# Patient Record
Sex: FEMALE | Race: WHITE | NOT HISPANIC OR LATINO | Employment: OTHER | URBAN - METROPOLITAN AREA
[De-identification: names, ages, dates, MRNs, and addresses within clinical notes are randomized per-mention and may not be internally consistent; named-entity substitution may affect disease eponyms.]

---

## 2018-12-17 ENCOUNTER — APPOINTMENT (OUTPATIENT)
Dept: RADIOLOGY | Facility: MEDICAL CENTER | Age: 33
End: 2018-12-17
Attending: EMERGENCY MEDICINE
Payer: MEDICAID

## 2018-12-17 ENCOUNTER — HOSPITAL ENCOUNTER (EMERGENCY)
Facility: MEDICAL CENTER | Age: 33
End: 2018-12-17
Attending: EMERGENCY MEDICINE
Payer: MEDICAID

## 2018-12-17 VITALS
HEIGHT: 63 IN | OXYGEN SATURATION: 95 % | TEMPERATURE: 99 F | RESPIRATION RATE: 16 BRPM | BODY MASS INDEX: 19.61 KG/M2 | HEART RATE: 79 BPM | SYSTOLIC BLOOD PRESSURE: 133 MMHG | DIASTOLIC BLOOD PRESSURE: 82 MMHG | WEIGHT: 110.67 LBS

## 2018-12-17 DIAGNOSIS — S93.401A SPRAIN OF RIGHT ANKLE, UNSPECIFIED LIGAMENT, INITIAL ENCOUNTER: ICD-10-CM

## 2018-12-17 DIAGNOSIS — S93.499S SPRAIN OF OTHER LIGAMENT OF ANKLE, UNSPECIFIED LATERALITY, SEQUELA: ICD-10-CM

## 2018-12-17 PROCEDURE — 700111 HCHG RX REV CODE 636 W/ 250 OVERRIDE (IP): Performed by: EMERGENCY MEDICINE

## 2018-12-17 PROCEDURE — 73610 X-RAY EXAM OF ANKLE: CPT | Mod: RT

## 2018-12-17 PROCEDURE — 96372 THER/PROPH/DIAG INJ SC/IM: CPT

## 2018-12-17 PROCEDURE — 99284 EMERGENCY DEPT VISIT MOD MDM: CPT

## 2018-12-17 RX ORDER — ONDANSETRON 4 MG/1
4 TABLET, ORALLY DISINTEGRATING ORAL EVERY 6 HOURS PRN
COMMUNITY
End: 2019-09-14

## 2018-12-17 RX ORDER — LEVOTHYROXINE SODIUM 0.07 MG/1
100 TABLET ORAL
Status: SHIPPED | COMMUNITY
End: 2022-06-01

## 2018-12-17 RX ORDER — LORATADINE 10 MG/1
10 TABLET ORAL DAILY
COMMUNITY
End: 2019-09-14

## 2018-12-17 RX ORDER — PRAZOSIN HYDROCHLORIDE 1 MG/1
1 CAPSULE ORAL NIGHTLY
Status: ON HOLD | COMMUNITY
End: 2020-10-02

## 2018-12-17 RX ORDER — HYDROCODONE BITARTRATE AND ACETAMINOPHEN 5; 325 MG/1; MG/1
1-2 TABLET ORAL EVERY 6 HOURS PRN
Qty: 8 TAB | Refills: 0 | Status: SHIPPED | OUTPATIENT
Start: 2018-12-17 | End: 2018-12-20

## 2018-12-17 RX ORDER — ONDANSETRON 4 MG/1
4 TABLET, ORALLY DISINTEGRATING ORAL ONCE
Status: COMPLETED | OUTPATIENT
Start: 2018-12-17 | End: 2018-12-17

## 2018-12-17 RX ADMIN — FENTANYL CITRATE 50 MCG: 50 INJECTION, SOLUTION INTRAMUSCULAR; INTRAVENOUS at 15:53

## 2018-12-17 RX ADMIN — ONDANSETRON 4 MG: 4 TABLET, ORALLY DISINTEGRATING ORAL at 15:52

## 2018-12-17 ASSESSMENT — LIFESTYLE VARIABLES: DO YOU DRINK ALCOHOL: NO

## 2018-12-17 NOTE — ED TRIAGE NOTES
Chief Complaint   Patient presents with   • Ankle Pain     left ankle     Pt wheeled to triage, c/o left ankle pain after one of her friend fell on her left foot.

## 2018-12-17 NOTE — ED NOTES
To room by wheelchair. Agree with triage note. She states she was sitting with ankles crossed when a friend fell and landed across her feet. CMS intact. Chart up for ERP.

## 2018-12-18 NOTE — ED NOTES
Right ankle air splint applied. Given crutches and demonstrates safe use. Verbalizes understanding of discharge and followup instructions.  VSS. Given Rx and signed narcotic consent. Ambulates with crutches to discharge.

## 2018-12-18 NOTE — ED PROVIDER NOTES
ED Provider Note    CHIEF COMPLAINT  Chief Complaint   Patient presents with   • Ankle Pain     left ankle       HPI  Kristen Luna is a 33 y.o. female here for evaluation of right ankle pain.  The patient states that she was at her family's house, when somebody jumped off of a bed, and landed on her right ankle.  She states that she was unable to walk secondary to pain, but states that the pain is only confined to the right ankle.  She has no knee pain or foot pain.  She has no fever, no vomiting, and no other medical complaints.  She has no chest pain or shortness of breath.  She has no fever or chills.  Patient not taking any prior to coming in.  Moving exacerbates the symptoms, and remaining still alleviates them.    PAST MEDICAL HISTORY   has a past medical history of Arrhythmia; Bipolar affective disorder (HCC); Breath shortness; Heart burn; Pain; and PTSD (post-traumatic stress disorder).    SOCIAL HISTORY  Social History     Social History Main Topics   • Smoking status: Current Some Day Smoker     Packs/day: 0.50     Years: 1.50     Last attempt to quit: 8/26/2009   • Smokeless tobacco: Never Used      Comment: one pack every 3months   • Alcohol use No   • Drug use: No   • Sexual activity: Not on file       SURGICAL HISTORY   has a past surgical history that includes knee arthroscopy (10/2009); dental extraction(s) (2007); knee arthroscopy (1/4/2010); synovectomy (1/4/2010); and medial meniscectomy (1/4/2010).    CURRENT MEDICATIONS  Home Medications     Reviewed by Dave Obrien R.N. (Registered Nurse) on 12/17/18 at 1503  Med List Status: Partial   Medication Last Dose Status   albuterol (PROVENTIL) 2.5mg/0.5ml NEBU  Active   FLUoxetine HCl (PROZAC PO) unk Active   Gabapentin (NEURONTIN PO) taking Active   HydrOXYzine Pamoate (VISTARIL PO) unk Active   levothyroxine (SYNTHROID) 75 MCG Tab taking Active   Linaclotide (LINZESS PO) taking Active   loratadine (CLARITIN) 10 MG Tab taking Active  "  Melatonin 3 MG CAPS  Active   ondansetron (ZOFRAN ODT) 4 MG TABLET DISPERSIBLE prn Active   oxycodone-acetaminophen (PERCOCET) 5-325 MG TABS  Active   prazosin (MINIPRESS) 1 MG Cap 12/16/2018 Active   SUMAtriptan Succinate (IMITREX PO)  Active   trazodone (DESYREL) 100 MG TABS unk Active   vitamin E (VITAMIN E) 1000 UNIT CAPS  Active                ALLERGIES  Allergies   Allergen Reactions   • Codeine    • Hydrocodone Itching   • Naprosyn [Naproxen] Hives   • Zoloft      Left hand swelling       REVIEW OF SYSTEMS  See HPI for further details. Review of systems as above, otherwise all other systems are negative.     PHYSICAL EXAM  VITAL SIGNS: /82   Pulse 79   Temp 37.2 °C (99 °F) (Temporal)   Resp 16   Ht 1.6 m (5' 3\")   Wt 50.2 kg (110 lb 10.7 oz)   SpO2 95%   BMI 19.60 kg/m²   *Constitutional: Well developed, well nourished. No acute distress.  HEENT: Normocephalic, atraumatic. MMM  Neck: Supple, Full range of motion   Chest/Pulmonary:  No respiratory distress.  Equal expansion   Musculoskeletal: No deformity, no edema, neurovascular intact.  Right lower extremity; tenderness to the lateral and medial malleolus.  DPP present.  Neurovascular intact distally.  Good cap refill.  Nontender toes, and nontender right knee.  Neuro: Clear speech, appropriate, cooperative, cranial nerves II-XII grossly intact.  Psych: Normal mood and affect      PROCEDURES     MEDICAL RECORD  I have reviewed patient's medical record and pertinent results are listed above.    COURSE & MEDICAL DECISION MAKING  I have reviewed any medical record information, laboratory studies and radiographic results as noted above.    DX-ANKLE 3+ VIEWS RIGHT   Final Result      Negative RIGHT ankle series.        I you have had any blood pressure issues while here in the emergency department, please see your doctor for a further evaluation or work up.    Differential diagnoses include but not limited to: Ankle strain versus fracture    5:10 " PM  The pt has been fitted for an aircast and crutches. She will be given pain medications, and will follow up.     This patient presents with ankle sprain.  At this time, I have counseled the patient/family regarding their medications, pain control, and follow up.  They will continue their medications, if any, as prescribed.  They will return immediately for any worsening symptoms and/or any other medical concerns.  They will see their doctor, or contact the doctor provided, in 1-2 days for follow up.       FINAL IMPRESSION  Ankle sprain    Electronically signed by: Hao Arzola, 12/17/2018 5:05 PM

## 2018-12-28 ENCOUNTER — APPOINTMENT (OUTPATIENT)
Dept: RADIOLOGY | Facility: MEDICAL CENTER | Age: 33
End: 2018-12-28
Attending: EMERGENCY MEDICINE
Payer: MEDICAID

## 2018-12-28 ENCOUNTER — HOSPITAL ENCOUNTER (EMERGENCY)
Facility: MEDICAL CENTER | Age: 33
End: 2018-12-28
Attending: EMERGENCY MEDICINE
Payer: MEDICAID

## 2018-12-28 VITALS
WEIGHT: 103 LBS | DIASTOLIC BLOOD PRESSURE: 88 MMHG | TEMPERATURE: 97.4 F | SYSTOLIC BLOOD PRESSURE: 124 MMHG | OXYGEN SATURATION: 99 % | HEART RATE: 88 BPM | HEIGHT: 63 IN | BODY MASS INDEX: 18.25 KG/M2 | RESPIRATION RATE: 16 BRPM

## 2018-12-28 DIAGNOSIS — M25.531 RIGHT WRIST PAIN: ICD-10-CM

## 2018-12-28 DIAGNOSIS — M25.521 RIGHT ELBOW PAIN: ICD-10-CM

## 2018-12-28 DIAGNOSIS — S63.501A SPRAIN OF RIGHT WRIST, INITIAL ENCOUNTER: ICD-10-CM

## 2018-12-28 PROCEDURE — 700102 HCHG RX REV CODE 250 W/ 637 OVERRIDE(OP): Performed by: EMERGENCY MEDICINE

## 2018-12-28 PROCEDURE — 73110 X-RAY EXAM OF WRIST: CPT | Mod: RT

## 2018-12-28 PROCEDURE — A9270 NON-COVERED ITEM OR SERVICE: HCPCS | Performed by: EMERGENCY MEDICINE

## 2018-12-28 PROCEDURE — 700111 HCHG RX REV CODE 636 W/ 250 OVERRIDE (IP): Performed by: EMERGENCY MEDICINE

## 2018-12-28 PROCEDURE — 99284 EMERGENCY DEPT VISIT MOD MDM: CPT

## 2018-12-28 PROCEDURE — 73080 X-RAY EXAM OF ELBOW: CPT | Mod: RT

## 2018-12-28 RX ORDER — IBUPROFEN 600 MG/1
600 TABLET ORAL ONCE
Status: COMPLETED | OUTPATIENT
Start: 2018-12-28 | End: 2018-12-28

## 2018-12-28 RX ORDER — ACETAMINOPHEN 325 MG/1
650 TABLET ORAL ONCE
Status: COMPLETED | OUTPATIENT
Start: 2018-12-28 | End: 2018-12-28

## 2018-12-28 RX ORDER — IBUPROFEN 400 MG/1
400 TABLET ORAL EVERY 6 HOURS PRN
Qty: 30 TAB | Refills: 0 | Status: SHIPPED | OUTPATIENT
Start: 2018-12-28 | End: 2019-01-19

## 2018-12-28 RX ORDER — ONDANSETRON 4 MG/1
4 TABLET, ORALLY DISINTEGRATING ORAL ONCE
Status: COMPLETED | OUTPATIENT
Start: 2018-12-28 | End: 2018-12-28

## 2018-12-28 RX ORDER — ACETAMINOPHEN 325 MG/1
325 TABLET ORAL EVERY 4 HOURS PRN
Qty: 30 TAB | Refills: 0 | Status: SHIPPED | OUTPATIENT
Start: 2018-12-28 | End: 2019-09-14

## 2018-12-28 RX ADMIN — ACETAMINOPHEN 650 MG: 325 TABLET, FILM COATED ORAL at 19:27

## 2018-12-28 RX ADMIN — ONDANSETRON 4 MG: 4 TABLET, ORALLY DISINTEGRATING ORAL at 19:27

## 2018-12-28 RX ADMIN — IBUPROFEN 600 MG: 600 TABLET, FILM COATED ORAL at 21:30

## 2018-12-29 NOTE — ED TRIAGE NOTES
"Chief Complaint   Patient presents with   • Wrist Pain     R     Pt was blocking a bookstand from falling, c/o pain to R wrist and back of hand. Limited ROM, pulses palpable.    /88   Pulse 92   Temp 36.3 °C (97.3 °F) (Temporal)   Resp 18   Ht 1.6 m (5' 3\")   SpO2 100%   BMI 19.60 kg/m²     Pt Informed regarding triage process and verbalized understanding to inform triage tech or RN for any changes in condition.  Placed in lobby.    "

## 2018-12-29 NOTE — ED NOTES
Radiology contacted about elbow xray results, per radiology film waiting to be read by radiologist.

## 2018-12-29 NOTE — ED PROVIDER NOTES
"                                     ED Provider Note    Scribed for Art Valente M.D. by Abel Haddad. 12/28/2018  7:35 PM    CHIEF COMPLAINT  Chief Complaint   Patient presents with   • Wrist Pain     R       ELSY Luna is a 33 y.o. female who presents to the ED for evaluation of right wrist pain onset about 2 hours ago. The patient reports that she was catching a DVD player that was falling off of a shelf when it hit her directly in her right wrist. Per patient, the pain radiates from her wrist up into her right forearm and right elbow. She endorses experiencing associated tingling to the right hand. She denies experiencing decreased range of motion to her right hand.     REVIEW OF SYSTEMS  MSK: Right wrist pain. Right arm pain. Right elbow pain. No decreased range of motion.  Neuro: Tingling in right hand.     PAST MEDICAL HISTORY   has a past medical history of Arrhythmia; Bipolar affective disorder (HCC); Breath shortness; Heart burn; Pain; and PTSD (post-traumatic stress disorder).    SOCIAL HISTORY  Social History     Social History Main Topics   • Smoking status: Current Some Day Smoker     Packs/day: 0.50     Years: 1.50     Last attempt to quit: 8/26/2009   • Smokeless tobacco: Never Used      Comment: one pack every 3months   • Alcohol use No   • Drug use: No   • Sexual activity: Not on file       SURGICAL HISTORY   has a past surgical history that includes knee arthroscopy (10/2009); dental extraction(s) (2007); knee arthroscopy (1/4/2010); synovectomy (1/4/2010); and medial meniscectomy (1/4/2010).    CURRENT MEDICATIONS  Home Medications    **Home medications have not yet been reviewed for this encounter**         ALLERGIES  Allergies   Allergen Reactions   • Codeine    • Hydrocodone Itching   • Naprosyn [Naproxen] Hives   • Zoloft      Left hand swelling       PHYSICAL EXAM  VITAL SIGNS: /88   Pulse 92   Temp 36.3 °C (97.3 °F) (Temporal)   Resp 18   Ht 1.6 m (5' 3\")  "  SpO2 100%   BMI 19.60 kg/m²  @LAISHA[[id number]::@     Genl: F sitting in chair comfortably, speaking clearly, appears in no acute distress   Head: NC/AT   ENT: Mucous membranes moist.  Chest: No TTP  Musculoskeletal: Pain free ROM of the neck. Moving upper and lower extremities and spontaneous in coordinated fashion. No tenderness to right shoulder or humerus.      Right Upper Extremity  - Skin: No abrasions, no lacerations, no ecchymosis  - Motor: Full ROM at shoulder, elbow, wrist; 5/5 wrist flexion/extension, thumb IP joint flexion/extension (AIN/PIN), abduction/adduction (ulnar)  -Pain with palpation on olecranon and interspersed on forearm with no clear deformity. Pain in anatomical snuff box and on distal ulna.   - Sensation intact to median/ulnar/radial nerves  - 2+ radial pulse, < 2 sec cap refill x 5 digits  ?  Neuro: A&Ox4 (person, place, time, situation), speech fluent, gait steady, no focal deficits appreciated, Sensation is grossly intact in the distal upper and lower extremities  Psych: Patient has an appropriate affect and behavior  Skin: No rash or lesions.  No pallor or jaundice.  No cyanosis.  Warm and dry.    DIAGNOSTIC STUDIES / PROCEDURES    RADIOLOGY  DX-WRIST-COMPLETE 3+ RIGHT   Final Result         No acute osseous abnormality.      DX-ELBOW-COMPLETE 3+ RIGHT    (Results Pending)       COURSE & MEDICAL DECISION MAKING  Pertinent Labs & Imaging studies reviewed. (See chart for details)    Differential diagnoses include but not limited to: Fracture, dislocation, ligamentus injury, and neurovascular injury.     7:35 PM - Patient seen and examined at bedside. Patient will be treated with Tylenol 650 mg and Zofran ODT 4 mg. Ordered Dx-Elbow-Right, Dx-Wrist-Right to evaluate her symptoms.     8:31 PM Patient reevaluated at bedside. I informed her of the diagnostic results and updated her on the plan of care including splint application. The patient understood and agreed to the plan of care including  discharge.     Medical Decision Making:   Patient presented to the emergency room for evaluation of wrist, distal forearm and elbow discomfort.  The patient has pain with active range of motion with the wrist and forearm but has no evidence of swelling or clear deformity.  There was testing at multiple areas around the wrist joint and over the possible radial head and olecranon.  X-rays are obtained and the patient was treated with oral Tylenol and ibuprofen with some alleviation.  X-rays are reviewed and demonstrate no signs of occasion of any of the joints and she is placed in a supportive splint with a thumb spica.  The concern for this would be that she has possible injury to the scaphoid due to her tenderness in the anatomical snuffbox.  We discussed what this could mean and the need for repeat images and 7-10 days if her pain does not subside.  We also discussed that while she does not have evidence of a joint effusion in the head.  Following the pain medications most of her pain had subsided and she continues to have no acute neurovascular injury on reexam she is otherwise well and will be discharged home in stable condition.    DISPOSITION:  Patient will be discharged home in stable condition.    FOLLOW UP:  Bessy Lopez D.O.  [address]  [address]  [facility] [address]  [phone]      establish care upon discharge    [facility], Emergency Dept  [address] [address] [phone]    If symptoms worsen    Viraj Jameson M.D.  [address] [address]  [phone]    Schedule an appointment as soon as possible for a visit in 10 days  For persistent pain      OUTPATIENT MEDICATIONS:  New Prescriptions    ACETAMINOPHEN (TYLENOL) 325 MG TAB    Take 1 Tab by mouth every four hours as needed.    IBUPROFEN (MOTRIN) 400 MG TAB    Take 1 Tab by mouth every 6 hours as needed for Moderate Pain.         FINAL IMPRESSION  Visit Diagnoses     ICD-10-CM   1. Sprain  of right wrist, initial encounter S63.501A   2. Right wrist pain M25.531   3. Right elbow pain M25.521        IAbel (Scribe), am scribing for, and in the presence of, Art Valente M.D..    Electronically signed by: Abel Haddad (Scribe), 12/28/2018    IArt M.D. personally performed the services described in this documentation, as scribed by Abel Haddad in my presence, and it is both accurate and complete. E.     The note accurately reflects work and decisions made by me.  Art Valente  12/29/2018  5:01 AM

## 2018-12-29 NOTE — ED NOTES
Patient discharged with instructions for joint sprain with prescriptions 0 signs and symptoms explained to patient for reasons to return to emergency department, Patient is understanding and has no further questions.

## 2019-01-18 ENCOUNTER — APPOINTMENT (OUTPATIENT)
Dept: RADIOLOGY | Facility: MEDICAL CENTER | Age: 34
End: 2019-01-18
Attending: EMERGENCY MEDICINE
Payer: MEDICAID

## 2019-01-18 ENCOUNTER — HOSPITAL ENCOUNTER (EMERGENCY)
Facility: MEDICAL CENTER | Age: 34
End: 2019-01-18
Attending: EMERGENCY MEDICINE
Payer: MEDICAID

## 2019-01-18 VITALS
TEMPERATURE: 97 F | WEIGHT: 112.88 LBS | OXYGEN SATURATION: 99 % | DIASTOLIC BLOOD PRESSURE: 62 MMHG | HEIGHT: 63 IN | RESPIRATION RATE: 18 BRPM | HEART RATE: 82 BPM | BODY MASS INDEX: 20 KG/M2 | SYSTOLIC BLOOD PRESSURE: 120 MMHG

## 2019-01-18 DIAGNOSIS — S93.401A SPRAIN OF RIGHT ANKLE, UNSPECIFIED LIGAMENT, INITIAL ENCOUNTER: ICD-10-CM

## 2019-01-18 PROCEDURE — 73610 X-RAY EXAM OF ANKLE: CPT | Mod: RT

## 2019-01-18 PROCEDURE — 700102 HCHG RX REV CODE 250 W/ 637 OVERRIDE(OP): Performed by: EMERGENCY MEDICINE

## 2019-01-18 PROCEDURE — 73630 X-RAY EXAM OF FOOT: CPT | Mod: RT

## 2019-01-18 PROCEDURE — 99284 EMERGENCY DEPT VISIT MOD MDM: CPT

## 2019-01-18 PROCEDURE — A9270 NON-COVERED ITEM OR SERVICE: HCPCS | Performed by: EMERGENCY MEDICINE

## 2019-01-18 RX ORDER — ACETAMINOPHEN 325 MG/1
650 TABLET ORAL ONCE
Status: COMPLETED | OUTPATIENT
Start: 2019-01-18 | End: 2019-01-18

## 2019-01-18 RX ADMIN — ACETAMINOPHEN 650 MG: 325 TABLET, FILM COATED ORAL at 22:30

## 2019-01-18 ASSESSMENT — PAIN SCALES - GENERAL: PAINLEVEL_OUTOF10: 7

## 2019-01-19 ENCOUNTER — OFFICE VISIT (OUTPATIENT)
Dept: URGENT CARE | Facility: CLINIC | Age: 34
End: 2019-01-19
Payer: MEDICAID

## 2019-01-19 VITALS
TEMPERATURE: 99.6 F | HEIGHT: 63 IN | DIASTOLIC BLOOD PRESSURE: 72 MMHG | WEIGHT: 112 LBS | RESPIRATION RATE: 16 BRPM | OXYGEN SATURATION: 96 % | BODY MASS INDEX: 19.84 KG/M2 | HEART RATE: 74 BPM | SYSTOLIC BLOOD PRESSURE: 110 MMHG

## 2019-01-19 DIAGNOSIS — S93.401D SPRAIN OF RIGHT ANKLE, UNSPECIFIED LIGAMENT, SUBSEQUENT ENCOUNTER: ICD-10-CM

## 2019-01-19 PROCEDURE — 99203 OFFICE O/P NEW LOW 30 MIN: CPT | Performed by: FAMILY MEDICINE

## 2019-01-19 ASSESSMENT — ENCOUNTER SYMPTOMS
CHILLS: 0
NUMBNESS: 0
LOSS OF SENSATION: 0
SENSORY CHANGE: 0
FEVER: 0
SHORTNESS OF BREATH: 0
INABILITY TO BEAR WEIGHT: 1
MUSCLE WEAKNESS: 0
TINGLING: 0

## 2019-01-19 NOTE — PROGRESS NOTES
Subjective:   Kristen Luna is a 33 y.o. female who presents for Ankle Pain (Lt ankle injury a month ago, getting worse)       Patient presents today after spraining ankle last night. This is a new problem  She was seen in the ED last night, x-rays were negative, and she was given a splint. She has been unable to put weight on her right ankle and has been using crutches to ambulate She states that she has sprained this ankle in the past and the splint has never worked for her. She states that she usually needs a boot to be able to help with pain and to ambulate. She states that she has been using ice and taking ibuprofen, but she has history of gastroparesis and ibuprofen suspension works better for her.       Ankle Pain    The incident occurred 12 to 24 hours ago. Incident location: walking home from work. The injury mechanism was a twisting injury and an inversion injury. The pain is present in the right ankle. The quality of the pain is described as aching and shooting. The pain is at a severity of 4/10. The pain has been constant since onset. Associated symptoms include an inability to bear weight. Pertinent negatives include no loss of sensation, muscle weakness, numbness or tingling. She reports no foreign bodies present. The symptoms are aggravated by weight bearing. She has tried ice, NSAIDs and immobilization for the symptoms. The treatment provided mild relief.     Review of Systems   Constitutional: Negative for chills and fever.   Respiratory: Negative for shortness of breath.    Cardiovascular: Negative for chest pain.   Musculoskeletal:        Positive for right ankle pain   Neurological: Negative for tingling, sensory change and numbness.       PMH:  has a past medical history of Arrhythmia; Bipolar affective disorder (HCC); Breath shortness; Heart burn; Pain; and PTSD (post-traumatic stress disorder).    MEDS:   Current Outpatient Prescriptions:   •  ibuprofen (MOTRIN) 100 MG/5ML Suspension,  Take 10 mL every 4-6 hours as needed for pain, Disp: 420 mL, Rfl: 0  •  acetaminophen (TYLENOL) 325 MG Tab, Take 1 Tab by mouth every four hours as needed., Disp: 30 Tab, Rfl: 0  •  levothyroxine (SYNTHROID) 75 MCG Tab, Take 75 mcg by mouth Every morning on an empty stomach., Disp: , Rfl:   •  prazosin (MINIPRESS) 1 MG Cap, Take 1 mg by mouth every evening., Disp: , Rfl:   •  ondansetron (ZOFRAN ODT) 4 MG TABLET DISPERSIBLE, Take 4 mg by mouth every 6 hours as needed for Nausea., Disp: , Rfl:   •  Linaclotide (LINZESS PO), Take 145 mg by mouth every 48 hours., Disp: , Rfl:   •  loratadine (CLARITIN) 10 MG Tab, Take 10 mg by mouth every day., Disp: , Rfl:   •  HydrOXYzine Pamoate (VISTARIL PO), Take  by mouth., Disp: , Rfl:   •  Melatonin 3 MG CAPS, Take  by mouth every bedtime., Disp: , Rfl:   •  oxycodone-acetaminophen (PERCOCET) 5-325 MG TABS, Take 1-2 Tabs by mouth every four hours as needed for Mild Pain (pain)., Disp: 20 Each, Rfl: 0  •  albuterol (PROVENTIL) 2.5mg/0.5ml NEBU, 2.5 mg by Nebulization route every four hours as needed., Disp: , Rfl:   •  trazodone (DESYREL) 100 MG TABS, Take 100 mg by mouth every evening., Disp: , Rfl:   •  SUMAtriptan Succinate (IMITREX PO), Take  by mouth as needed., Disp: , Rfl:   •  FLUoxetine HCl (PROZAC PO), Take 20 mg by mouth., Disp: , Rfl:   •  vitamin E (VITAMIN E) 1000 UNIT CAPS, Take 1 Cap by mouth 2 Times a Day., Disp: , Rfl:   •  Gabapentin (NEURONTIN PO), Take 300 mg by mouth 2 Times a Day., Disp: , Rfl:     ALLERGIES:   Allergies   Allergen Reactions   • Codeine    • Hydrocodone Itching   • Naprosyn [Naproxen] Hives   • Zoloft      Left hand swelling   • Fentanyl Photosensitivity     Reports blurred vision        SURGHX:   Past Surgical History:   Procedure Laterality Date   • KNEE ARTHROSCOPY  1/4/2010    Performed by KRYSTIAN COSTA at SURGERY [facility] ORS   • SYNOVECTOMY  1/4/2010    Performed by KRYSTIAN COSTA at [facility] ORS   • MEDIAL  "MENISCECTOMY  1/4/2010    Performed by KRYSTIAN COSTA at SURGERY [facility]   • KNEE ARTHROSCOPY  10/2009    left   • DENTAL EXTRACTION(S)  2007    wisdom teeth       SOCHX:  reports that she has been smoking.  She has a 0.75 pack-year smoking history. She has never used smokeless tobacco. She reports that she does not drink alcohol or use drugs.    FH: Reviewed with patient, not pertinent to this visit.     Objective:   /72   Pulse 74   Temp 37.6 °C (99.6 °F)   Resp 16   Ht 1.6 m (5' 3\")   Wt 50.8 kg (112 lb)   LMP 12/30/2018   SpO2 96%   BMI 19.84 kg/m²   Physical Exam   Constitutional: She is oriented to person, place, and time. She appears well-developed and well-nourished. No distress.   HENT:   Head: Normocephalic and atraumatic.   Eyes: Conjunctivae and EOM are normal.   Neck: Normal range of motion. No tracheal deviation present.   Cardiovascular: Normal rate and regular rhythm.    Pulses:       Dorsalis pedis pulses are 2+ on the right side.        Posterior tibial pulses are 2+ on the right side.   Pulmonary/Chest: Effort normal. No respiratory distress.   Musculoskeletal:        Right ankle: She exhibits decreased range of motion and swelling. She exhibits no deformity, no laceration and normal pulse. Tenderness. AITFL and CF ligament tenderness found. No lateral malleolus, no medial malleolus, no head of 5th metatarsal and no proximal fibula tenderness found.   Pain with ROM all directions.    Neurological: She is alert and oriented to person, place, and time. No sensory deficit.   Skin: Skin is warm and dry. Capillary refill takes less than 2 seconds.   Psychiatric: She has a normal mood and affect. Her behavior is normal. Judgment and thought content normal.       Assessment/Plan:     1. Sprain of right ankle, unspecified ligament, subsequent encounter  ibuprofen (MOTRIN) 100 MG/5ML Suspension    REFERRAL TO SPORTS MEDICINE     - Patient given short boot  - Advised to continue " ibuprofen, ice daily. Educated on RICE  - Advised to advance weight bearing as tolerated  - Advised to return if symptoms worsen or do not improve    Differential diagnosis, natural history, supportive care, and indications for immediate follow-up discussed.  Patient's case was reviewed and discussed with Dr. Alonso during Franklin GARCIA's training period.

## 2019-01-19 NOTE — ED PROVIDER NOTES
ED Provider Note    Scribed for Tyrell Steel M.D. by Jen Deleon. 1/18/2019  10:06 PM    Primary care provider: Pcp Not In Computer  Means of arrival: walk in   History obtained from: patient   History limited by: none     CHIEF COMPLAINT  Chief Complaint   Patient presents with   • Ankle Injury     twisted right ankle while walking today, difficulty bearing weight, CMS intact       HPI  Kristen Luna is a 33 y.o. female who presents to the Emergency Department with right ankle pain which began this evening. Patient reports associated right foot pain. Patient reports she accidentally twisted her right ankle while walking. She is now unable to bear weight on her right ankle as it exacerbates her pain. No right knee or right hip pain. No other acute medical complaints or concerns.     REVIEW OF SYSTEMS  Pertinent positives include: right ankle pain, right foot pain. Pertinent negatives include: right knee pain, right hip pain. See history of present illness.     PAST MEDICAL HISTORY   has a past medical history of Arrhythmia; Bipolar affective disorder (HCC); Breath shortness; Heart burn; Pain; and PTSD (post-traumatic stress disorder).    SURGICAL HISTORY   has a past surgical history that includes knee arthroscopy (10/2009); dental extraction(s) (2007); knee arthroscopy (1/4/2010); synovectomy (1/4/2010); and medial meniscectomy (1/4/2010).    SOCIAL HISTORY  Social History   Substance Use Topics   • Smoking status: Current Some Day Smoker     Packs/day: 0.50     Years: 1.50     Last attempt to quit: 8/26/2009   • Smokeless tobacco: Never Used      Comment: one pack every 3months   • Alcohol use No      History   Drug Use No       FAMILY HISTORY  Family History   Problem Relation Age of Onset   • Diabetes Unknown    • Heart Disease Mother        CURRENT MEDICATIONS  Current medications can be reviewed in the nurse's note.     ALLERGIES  Allergies   Allergen Reactions   • Codeine    • Hydrocodone Itching  "  • Naprosyn [Naproxen] Hives   • Zoloft      Left hand swelling   • Fentanyl Photosensitivity     Reports blurred vision        PHYSICAL EXAM  VITAL SIGNS: /67   Pulse 87   Temp 36.1 °C (97 °F) (Temporal)   Resp 16   Ht 1.6 m (5' 3\")   Wt 51.2 kg (112 lb 14 oz)   LMP 12/30/2018   SpO2 99%   BMI 20.00 kg/m²     Constitutional: Alert in no apparent distress.  HENT: No signs of trauma, Bilateral external ears normal, Nose normal. Uvula midline.   Eyes: Pupils are equal and reactive, Conjunctiva normal, Non-icteric.   Neck: Normal range of motion, No tenderness, Supple, No stridor.   Cardiovascular: Regular rate and rhythm, no murmurs.   Thorax & Lungs: Normal breath sounds, No respiratory distress, No wheezing, No chest tenderness.   Abdomen: Bowel sounds normal, Soft, No tenderness, No peritoneal signs, No masses, No pulsatile masses.   Skin: Warm, Dry, No erythema, No rash.   Extremities: Intact distal pulses, No cyanosis. Tenderness to palpation to the right lateral posterior malleolus, tenderness to palpation of the right 5th metatarsal. Full range of motion of the right knee and right hip w/o pain and with normal (5/5 strength).  Musculoskeletal: Tenderness to palpation to the right lateral posterior malleolus, tenderness to palpation of the right 5th metatarsal. Full range of motion of the right knee and right hip.   Neurologic: Alert , Normal motor function, Normal sensory function, No focal deficits noted.   Psychiatric: Affect normal, Judgment normal, Mood normal.     DIAGNOSTIC STUDIES / PROCEDURES    RADIOLOGY  DX-ANKLE 3+ VIEWS RIGHT   Final Result      Negative right foot and right ankle series      DX-FOOT-COMPLETE 3+ RIGHT   Final Result      Negative right foot and right ankle series        The radiologist's interpretation of all radiological studies have been reviewed by me.    COURSE & MEDICAL DECISION MAKING  Nursing notes, VS, PMSFHx reviewed in chart.    33 y.o. female p/w chief " complaint of right ankle and right foot pain which began this evening after accidentally twisting it while walking.    10:06 PM Patient seen and examined at bedside.     The differential diagnoses include but are not limited to:   Ordered DX right ankle and right foot to rule out fracture vs sprain.     11:25 PM- Reviewed the patient's imaging results which were negative for fracture.     11:31 PM- Patient rechecked at bedside. She was placed in a splint and is stable for discharge. The patient was updated on diagnostic test results as seen above. The patient will be discharged, status improved, with instructions regarding supportive care and medications. Instructions were given for follow-up. Discussed indications for seeking immediate medical attention. Patient was given the opportunity for questions. The patient understands and agrees.     The patient will return for new or worsening symptoms and is stable at the time of discharge.    DISPOSITION:  Patient will be discharged home in stable condition.    FOLLOW UP:  Jose Cruz Chang M.D.  [address]  [address]  [facility] [id number] [phone]      please call to schedule follow up appointment if pain persists    [facility], Emergency Dept  [facility] [facility] [address] [phone]    If symptoms worsen    FINAL IMPRESSION  1. Sprain of right ankle, unspecified ligament, initial encounter       Jen HERNANDEZ (Hasmukhibmarv), am scribing for, and in the presence of, Tyrell Steel M.D..    Electronically signed by: Jen Deleon (Hasmukhibmarv), 1/18/2019    Tyrell HERNANDEZ M.D. personally performed the services described in this documentation, as scribed by Jen Deleon in my presence, and it is both accurate and complete. E.     The note accurately reflects work and decisions made by me.  Tyrell Steel  1/19/2019  1:39 AM

## 2019-01-19 NOTE — ED TRIAGE NOTES
Kristen Luna  33 y.o.  Chief Complaint   Patient presents with   • Ankle Injury     twisted right ankle while walking today, difficulty bearing weight, CMS intact     Patient wheeled in wc with family to triage room with above complaint.  Patient appears in mild discomfort.  Mild swelling to right ankle, CMS intact.  Able to bear weight for stand up scale.      Patient escorted to the lobby and instructed to notify staff of any changes in condition.

## 2019-08-08 ENCOUNTER — HOSPITAL ENCOUNTER (OUTPATIENT)
Dept: LAB | Facility: MEDICAL CENTER | Age: 34
End: 2019-08-08
Attending: PSYCHIATRY & NEUROLOGY
Payer: MEDICAID

## 2019-08-08 LAB
CHOLEST SERPL-MCNC: 219 MG/DL (ref 100–199)
EST. AVERAGE GLUCOSE BLD GHB EST-MCNC: 100 MG/DL
FASTING STATUS PATIENT QL REPORTED: NORMAL
HBA1C MFR BLD: 5.1 % (ref 0–5.6)
HDLC SERPL-MCNC: 45 MG/DL
LDLC SERPL CALC-MCNC: 151 MG/DL
TRIGL SERPL-MCNC: 116 MG/DL (ref 0–149)
TSH SERPL DL<=0.005 MIU/L-ACNC: 5.24 UIU/ML (ref 0.38–5.33)

## 2019-08-08 PROCEDURE — 80061 LIPID PANEL: CPT

## 2019-08-08 PROCEDURE — 36415 COLL VENOUS BLD VENIPUNCTURE: CPT

## 2019-08-08 PROCEDURE — 84443 ASSAY THYROID STIM HORMONE: CPT

## 2019-08-08 PROCEDURE — 83036 HEMOGLOBIN GLYCOSYLATED A1C: CPT

## 2019-08-16 ENCOUNTER — OFFICE VISIT (OUTPATIENT)
Dept: URGENT CARE | Facility: CLINIC | Age: 34
End: 2019-08-16
Payer: MEDICAID

## 2019-08-16 ENCOUNTER — APPOINTMENT (OUTPATIENT)
Dept: RADIOLOGY | Facility: IMAGING CENTER | Age: 34
End: 2019-08-16
Attending: PHYSICIAN ASSISTANT
Payer: MEDICAID

## 2019-08-16 VITALS
OXYGEN SATURATION: 98 % | DIASTOLIC BLOOD PRESSURE: 76 MMHG | HEART RATE: 85 BPM | RESPIRATION RATE: 15 BRPM | BODY MASS INDEX: 19.84 KG/M2 | WEIGHT: 112 LBS | TEMPERATURE: 98 F | SYSTOLIC BLOOD PRESSURE: 118 MMHG | HEIGHT: 63 IN

## 2019-08-16 DIAGNOSIS — T18.9XXA FOREIGN BODY IN DIGESTIVE TRACT, INITIAL ENCOUNTER: ICD-10-CM

## 2019-08-16 PROCEDURE — 74018 RADEX ABDOMEN 1 VIEW: CPT | Performed by: EMERGENCY MEDICINE

## 2019-08-16 PROCEDURE — 99214 OFFICE O/P EST MOD 30 MIN: CPT | Performed by: PHYSICIAN ASSISTANT

## 2019-08-16 RX ORDER — FAMOTIDINE 20 MG/1
20 TABLET, FILM COATED ORAL
Refills: 2 | COMMUNITY
Start: 2019-08-07 | End: 2019-09-05

## 2019-08-16 RX ORDER — GABAPENTIN 300 MG/1
300 CAPSULE ORAL
Refills: 1 | COMMUNITY
Start: 2019-08-11 | End: 2019-09-05

## 2019-08-16 RX ORDER — OLANZAPINE 5 MG/1
TABLET ORAL
Refills: 3 | COMMUNITY
Start: 2019-08-07 | End: 2019-09-05

## 2019-08-16 RX ORDER — LORATADINE 10 MG/1
TABLET ORAL
COMMUNITY
Start: 2019-07-11 | End: 2019-09-05

## 2019-08-16 RX ORDER — LEVOTHYROXINE SODIUM 0.07 MG/1
TABLET ORAL
COMMUNITY
End: 2019-09-14

## 2019-08-16 RX ORDER — LORATADINE 10 MG/1
CAPSULE, LIQUID FILLED ORAL
COMMUNITY
End: 2019-09-05

## 2019-08-16 RX ORDER — CHOLESTYRAMINE 4 G/9G
POWDER, FOR SUSPENSION ORAL
COMMUNITY
Start: 2019-08-05 | End: 2019-09-05

## 2019-08-16 RX ORDER — PANTOPRAZOLE SODIUM 40 MG/1
TABLET, DELAYED RELEASE ORAL
COMMUNITY
Start: 2019-01-11 | End: 2019-09-14

## 2019-08-16 RX ORDER — OLANZAPINE 5 MG/1
TABLET ORAL
COMMUNITY
Start: 2019-04-02 | End: 2019-09-05

## 2019-08-16 RX ORDER — GABAPENTIN 300 MG/1
CAPSULE ORAL
COMMUNITY
Start: 2019-04-02 | End: 2019-09-05

## 2019-08-16 RX ORDER — MELOXICAM 15 MG/1
TABLET ORAL
COMMUNITY
Start: 2019-03-14 | End: 2019-09-05

## 2019-08-16 RX ORDER — PRAZOSIN HYDROCHLORIDE 1 MG/1
CAPSULE ORAL
COMMUNITY
Start: 2018-12-05 | End: 2019-09-05

## 2019-08-16 ASSESSMENT — ENCOUNTER SYMPTOMS
SHORTNESS OF BREATH: 0
COUGH: 0
DIARRHEA: 0
CHILLS: 0
PALPITATIONS: 0
ABDOMINAL PAIN: 0
VOMITING: 0
DIZZINESS: 0
NAUSEA: 0
WEIGHT LOSS: 0
CONSTIPATION: 0
WEAKNESS: 0
HEADACHES: 0
BLOOD IN STOOL: 0
DIAPHORESIS: 0
FEVER: 0
HEARTBURN: 0

## 2019-08-16 NOTE — PROGRESS NOTES
Subjective:      Kristen Luna is a 34 y.o. female who presents with Other (KUB)            Patient is a 34-year-old female who presents with request for KUB x-ray.  She is currently being seen by gastroenterology had a study done where she swallowed a capsule to measure gastroparesis.  She needs this exam done to make sure that it has dissolved.      Review of Systems   Constitutional: Negative for chills, diaphoresis, fever, malaise/fatigue and weight loss.   Respiratory: Negative for cough and shortness of breath.    Cardiovascular: Negative for chest pain and palpitations.   Gastrointestinal: Negative for abdominal pain, blood in stool, constipation, diarrhea, heartburn, melena, nausea and vomiting.   Skin: Negative for itching and rash.   Neurological: Negative for dizziness, weakness and headaches.   All other systems reviewed and are negative.    PMH:  has a past medical history of Arrhythmia, Bipolar affective disorder (HCC), Breath shortness, Heart burn, Pain, and PTSD (post-traumatic stress disorder).  MEDS:   Current Outpatient Medications:   •  meloxicam (MOBIC) 15 MG tablet, MELOXICAM 15 MG TABS, Disp: , Rfl:   •  OLANZapine (ZYPREXA) 5 MG Tab, OLANZAPINE 5 MG TABS, Disp: , Rfl:   •  Omeprazole Magnesium (ACID REDUCER) 20.6 (20 Base) MG CAPSULE DELAYED RELEASE, ACID REDUCER 20.6 (20 Base) MG CPDR, Disp: , Rfl:   •  pantoprazole (PROTONIX) 40 MG Tablet Delayed Response, PANTOPRAZOLE SODIUM 40 MG TBEC, Disp: , Rfl:   •  gabapentin (NEURONTIN) 300 MG Cap, GABAPENTIN 300 MG CAPS, Disp: , Rfl:   •  prazosin (MINIPRESS) 1 MG Cap, PRAZOSIN HCL 1 MG CAPS, Disp: , Rfl:   •  loratadine (CLARITIN) 10 MG Tab, , Disp: , Rfl:   •  cholestyramine (QUESTRAN) 4 g packet, , Disp: , Rfl:   •  loratadine (CLARITIN) 5 MG/5ML syrup, TAKE 1 TABLET BY MOUTH ONCE DAILY, Disp: , Rfl: 11  •  famotidine (PEPCID) 20 MG Tab, Take 20 mg by mouth., Disp: , Rfl: 2  •  OLANZapine (ZYPREXA) 5 MG Tab, TAKE 1 TABLET BY MOUTH IN THE  EVENING FOR BIPOLAR, Disp: , Rfl: 3  •  gabapentin (NEURONTIN) 300 MG Cap, Take 300 mg by mouth., Disp: , Rfl: 1  •  levothyroxine (SYNTHROID) 75 MCG Tab, LEVOTHYROXINE SODIUM 75 MCG TABS, Disp: , Rfl:   •  linaCLOtide (LINZESS) 145 MCG Cap, LINZESS 145 MCG CAPS, Disp: , Rfl:   •  Loratadine 10 MG Cap, LORATADINE 10 MG CAPS, Disp: , Rfl:   •  ibuprofen (MOTRIN) 100 MG/5ML Suspension, Take 10 mL every 4-6 hours as needed for pain, Disp: 420 mL, Rfl: 0  •  acetaminophen (TYLENOL) 325 MG Tab, Take 1 Tab by mouth every four hours as needed., Disp: 30 Tab, Rfl: 0  •  levothyroxine (SYNTHROID) 75 MCG Tab, Take 75 mcg by mouth Every morning on an empty stomach., Disp: , Rfl:   •  prazosin (MINIPRESS) 1 MG Cap, Take 1 mg by mouth every evening., Disp: , Rfl:   •  ondansetron (ZOFRAN ODT) 4 MG TABLET DISPERSIBLE, Take 4 mg by mouth every 6 hours as needed for Nausea., Disp: , Rfl:   •  Linaclotide (LINZESS PO), Take 145 mg by mouth every 48 hours., Disp: , Rfl:   •  loratadine (CLARITIN) 10 MG Tab, Take 10 mg by mouth every day., Disp: , Rfl:   •  HydrOXYzine Pamoate (VISTARIL PO), Take  by mouth., Disp: , Rfl:   •  Melatonin 3 MG CAPS, Take  by mouth every bedtime., Disp: , Rfl:   •  oxycodone-acetaminophen (PERCOCET) 5-325 MG TABS, Take 1-2 Tabs by mouth every four hours as needed for Mild Pain (pain)., Disp: 20 Each, Rfl: 0  •  albuterol (PROVENTIL) 2.5mg/0.5ml NEBU, 2.5 mg by Nebulization route every four hours as needed., Disp: , Rfl:   •  trazodone (DESYREL) 100 MG TABS, Take 100 mg by mouth every evening., Disp: , Rfl:   •  SUMAtriptan Succinate (IMITREX PO), Take  by mouth as needed., Disp: , Rfl:   •  FLUoxetine HCl (PROZAC PO), Take 20 mg by mouth., Disp: , Rfl:   •  vitamin E (VITAMIN E) 1000 UNIT CAPS, Take 1 Cap by mouth 2 Times a Day., Disp: , Rfl:   •  Gabapentin (NEURONTIN PO), Take 300 mg by mouth 2 Times a Day., Disp: , Rfl:   ALLERGIES:   Allergies   Allergen Reactions   • Codeine    • Hydrocodone Itching  "  • Naprosyn [Naproxen] Hives   • Zoloft      Left hand swelling   • Fentanyl Photosensitivity     Reports blurred vision      SURGHX:   Past Surgical History:   Procedure Laterality Date   • KNEE ARTHROSCOPY  1/4/2010    Performed by KRYSTIAN COSTA at SURGERY [facility]   • SYNOVECTOMY  1/4/2010    Performed by KRYSTIAN COSTA at SURGERY [facility]   • MEDIAL MENISCECTOMY  1/4/2010    Performed by KRYSTIAN COSTA at SURGERY [facility]   • KNEE ARTHROSCOPY  10/2009    left   • DENTAL EXTRACTION(S)  2007    wisdom teeth     SOCHX:  reports that she has been smoking. She has a 0.75 pack-year smoking history. She has never used smokeless tobacco. She reports that she does not drink alcohol or use drugs.  FH: Family history was reviewed, no pertinent findings to report  Medications, Allergies, and current problem list reviewed today in Epic       Objective:     /76   Pulse 85   Temp 36.7 °C (98 °F) (Temporal)   Resp 15   Ht 1.6 m (5' 3\")   Wt 50.8 kg (112 lb)   SpO2 98%   BMI 19.84 kg/m²      Physical Exam   Constitutional: She appears well-developed and well-nourished.   Cardiovascular: Normal rate, regular rhythm and normal heart sounds.   Pulmonary/Chest: Effort normal and breath sounds normal.   Skin: Skin is warm and dry. No rash noted. No erythema.   Psychiatric: She has a normal mood and affect. Her behavior is normal. Judgment and thought content normal.   Vitals reviewed.              8/16/2019 1:46 PM    HISTORY/REASON FOR EXAM:  possible retained GI capsule.  Possible retained GI capsule    TECHNIQUE/EXAM DESCRIPTION AND NUMBER OF VIEWS:  1 view(s) of the abdomen.    COMPARISON: None    FINDINGS:  AP view of the abdomen demonstrates a nonobstructive bowel gas pattern. There is a small amount of colonic stool. Surgical clips project over the right upper quadrant. No radiopaque foreign body is identified to suggest a retained capsule. Rounded   structure projecting " anterior to the right ischium is likely external to the patient. There is a transitional vertebral body at the lumbosacral junction.      Impression       No radiopaque foreign bodies identified to suggest a retained capsule.       Assessment/Plan:     1. Foreign body in digestive tract, initial encounter  - NEG X RAY- Follow up with GI  - [id number] VIEW; Future    Differential diagnosis, natural history, supportive care discussed. Follow-up with primary care provider within 7-10 days, emergency room precautions discussed.  Patient and/or family appears understanding of information.  Handout and review of patients diagnosis and treatment was discussed extensively.

## 2019-09-05 ENCOUNTER — HOSPITAL ENCOUNTER (EMERGENCY)
Facility: MEDICAL CENTER | Age: 34
End: 2019-09-05
Attending: EMERGENCY MEDICINE
Payer: MEDICAID

## 2019-09-05 ENCOUNTER — APPOINTMENT (OUTPATIENT)
Dept: RADIOLOGY | Facility: MEDICAL CENTER | Age: 34
End: 2019-09-05
Attending: EMERGENCY MEDICINE
Payer: MEDICAID

## 2019-09-05 VITALS
BODY MASS INDEX: 24.46 KG/M2 | HEART RATE: 68 BPM | SYSTOLIC BLOOD PRESSURE: 135 MMHG | DIASTOLIC BLOOD PRESSURE: 72 MMHG | TEMPERATURE: 98.8 F | RESPIRATION RATE: 17 BRPM | HEIGHT: 62 IN | OXYGEN SATURATION: 100 % | WEIGHT: 132.94 LBS

## 2019-09-05 DIAGNOSIS — R07.81 RIB PAIN ON RIGHT SIDE: ICD-10-CM

## 2019-09-05 LAB — EKG IMPRESSION: NORMAL

## 2019-09-05 PROCEDURE — 99284 EMERGENCY DEPT VISIT MOD MDM: CPT

## 2019-09-05 PROCEDURE — 700111 HCHG RX REV CODE 636 W/ 250 OVERRIDE (IP): Performed by: EMERGENCY MEDICINE

## 2019-09-05 PROCEDURE — 93005 ELECTROCARDIOGRAM TRACING: CPT

## 2019-09-05 PROCEDURE — 93005 ELECTROCARDIOGRAM TRACING: CPT | Performed by: EMERGENCY MEDICINE

## 2019-09-05 PROCEDURE — 71101 X-RAY EXAM UNILAT RIBS/CHEST: CPT | Mod: RT

## 2019-09-05 PROCEDURE — 96372 THER/PROPH/DIAG INJ SC/IM: CPT

## 2019-09-05 RX ORDER — OXYCODONE HYDROCHLORIDE AND ACETAMINOPHEN 5; 325 MG/1; MG/1
1 TABLET ORAL ONCE
Status: DISCONTINUED | OUTPATIENT
Start: 2019-09-05 | End: 2019-09-05 | Stop reason: HOSPADM

## 2019-09-05 RX ORDER — OXYCODONE HYDROCHLORIDE AND ACETAMINOPHEN 5; 325 MG/1; MG/1
1 TABLET ORAL EVERY 4 HOURS PRN
Qty: 10 TAB | Refills: 0 | Status: SHIPPED | OUTPATIENT
Start: 2019-09-05 | End: 2019-09-08

## 2019-09-05 RX ORDER — MORPHINE SULFATE 4 MG/ML
4 INJECTION, SOLUTION INTRAMUSCULAR; INTRAVENOUS ONCE
Status: COMPLETED | OUTPATIENT
Start: 2019-09-05 | End: 2019-09-05

## 2019-09-05 RX ORDER — ONDANSETRON 4 MG/1
4 TABLET, ORALLY DISINTEGRATING ORAL ONCE
Status: COMPLETED | OUTPATIENT
Start: 2019-09-05 | End: 2019-09-05

## 2019-09-05 RX ADMIN — MORPHINE SULFATE 4 MG: 4 INJECTION INTRAVENOUS at 17:43

## 2019-09-05 RX ADMIN — ONDANSETRON 4 MG: 4 TABLET, ORALLY DISINTEGRATING ORAL at 17:44

## 2019-09-05 ASSESSMENT — PAIN DESCRIPTION - DESCRIPTORS: DESCRIPTORS: SHARP;SHOOTING

## 2019-09-05 NOTE — ED PROVIDER NOTES
ED Provider Note    Scribed for Enid Nance M.D. by Emilia Lara. 9/5/2019, 4:53 PM.    Primary care provider: Pcp Pt States None  Means of arrival: Walk in   History obtained from: Patient  History limited by: None     CHIEF COMPLAINT  Chief Complaint   Patient presents with   • Rib Pain     right side, injured Friday, increasing in pain   • Shortness of Breath       HPI  Kristen Luna is a 34 y.o. female who presents to the Emergency Department for right sided rib pain onset 1 week ago. The patient states she was leaning over her recliner and felt she may have pulled a muscle but the pain is worsening. She states the pain is exacerbated by inspiration. She denies nausea, vomiting, fever, abdominal pain, or leg swelling. The patient has a history of Sandra Danlos syndrome.     REVIEW OF SYSTEMS  Pertinent positives include right sided rib pain . Pertinent negatives include no nausea, vomiting, fever, abdominal pain, or leg swelling, cough, blood in sputum, bleeding problems. All other systems reviewed and negative.     PAST MEDICAL HISTORY   has a past medical history of Arrhythmia, Bipolar affective disorder (HCC), Breath shortness, Heart burn, Pain, and PTSD (post-traumatic stress disorder).    SURGICAL HISTORY   has a past surgical history that includes knee arthroscopy (10/2009); dental extraction(s) (2007); knee arthroscopy (1/4/2010); synovectomy (1/4/2010); and medial meniscectomy (1/4/2010).    SOCIAL HISTORY  Social History     Tobacco Use   • Smoking status: Current Some Day Smoker     Packs/day: 0.50     Years: 1.50     Pack years: 0.75     Last attempt to quit: 8/26/2009     Years since quitting: 10.0   • Smokeless tobacco: Never Used   • Tobacco comment: one pack every 3months   Substance Use Topics   • Alcohol use: No   • Drug use: No      Social History     Substance and Sexual Activity   Drug Use No       FAMILY HISTORY  Family History   Problem Relation Age of Onset   • Diabetes Unknown   "  • Heart Disease Mother        CURRENT MEDICATIONS  Home Medications     Reviewed by Rosibel Nogueira R.N. (Registered Nurse) on 09/05/19 at 1604  Med List Status: Complete   Medication Last Dose Status   acetaminophen (TYLENOL) 325 MG Tab  Active   Gabapentin (NEURONTIN PO)  Active   HydrOXYzine Pamoate (VISTARIL PO)  Active   levothyroxine (SYNTHROID) 75 MCG Tab  Active   levothyroxine (SYNTHROID) 75 MCG Tab  Active   loratadine (CLARITIN) 10 MG Tab  Active   Lurasidone HCl (LATUDA PO)  Active   ondansetron (ZOFRAN ODT) 4 MG TABLET DISPERSIBLE  Active   pantoprazole (PROTONIX) 40 MG Tablet Delayed Response  Active   prazosin (MINIPRESS) 1 MG Cap  Active                ALLERGIES  Allergies   Allergen Reactions   • Codeine    • Hydrocodone Itching   • Naprosyn [Naproxen] Hives   • Zoloft      Left hand swelling   • Fentanyl Photosensitivity     Reports blurred vision        PHYSICAL EXAM  VITAL SIGNS: /81   Pulse 66   Temp 37.1 °C (98.8 °F) (Temporal)   Resp 16   Ht 1.575 m (5' 2\")   Wt 60.3 kg (132 lb 15 oz)   SpO2 100%   BMI 24.31 kg/m²     Constitutional:  Well developed, No acute distress, Non-toxic appearance.   HENT: Normocephalic, Atraumatic, Bilateral external ears normal, Oropharynx moist  Eyes: PERRL, EOMI, Conjunctiva normal   Neck: Normal range of motion, No tenderness  Cardiovascular: Normal heart rate, Normal rhythm  Thorax & Lungs: Tenderness to the right lateral ribs around rib nine and ten no crepitus, no palpable deformity.Normal breath sounds, No respiratory distress,  No wheezing,   Abdomen: Benign abdominal exam, no guarding no rebound, no right upper quadrant tenderness, no distention  Skin: Warm, Dry, No erythema, No bruising  Back: No tenderness, No CVA tenderness.   Extremities: Intact distal pulses, No edema, No tenderness   Neurologic: Alert & oriented x 3, Normal motor function, Normal sensory function, No focal deficits noted.   Psychiatric: Appropriate                  "                                    DIAGNOSTIC STUDIES / PROCEDURES\    LABS    Results for orders placed or performed during the hospital encounter of 19   EKG (NOW)   Result Value Ref Range    Report       [facility].    Test Date:  2019  Pt Name:    ELFI CRONIN               Department: ER  MRN:        [id number]                      Room:  Gender:     Female                       Technician: [id number]  :        1985                   Requested By:ER TRIAGE PROTOCOL  Order #:    [id number]                    Reading MD:    Measurements  Intervals                                Axis  Rate:       66                           P:          28  MD:         176                          QRS:        -37  QRSD:       90                           T:          15  QT:         408  QTc:        428    Interpretive Statements  SINUS RHYTHM  LEFT AXIS DEVIATION  LOW VOLTAGE THROUGHOUT  Compared to ECG 2011 12:49:23  No significant changes       12 lead EKG interpreted by me.   Rhythm:  Normal sinus rhythm   Rate: 66  Axis: Left   Ectopy: None  Conduction: Normal  ST Segments: No acute change  T Waves: No acute change  Q Waves: None  Clinical Impression: No ST elevation myocardial infarction.   Comparison: No changes     RADIOLOGY  [id number] (WITH 1-VIEW CXR) RIGHT   Final Result      1.  No acute cardiopulmonary disease.   2.  No acute RIGHT rib findings.        The radiologist's interpretation of all radiological studies have been reviewed by me.    COURSE & MEDICAL DECISION MAKING  Nursing notes, VS, PMSFHx reviewed in chart.     Patient presents to the emergency department complaining of rib pain.  She is concerned that she may have a fracture.  Patient is not hypoxic or any acute respiratory distress.  She is having pain.  We will proceed with chest x-ray.  She does have equal breath sounds bilaterally.  No history of fever or productive cough.  Doubt pneumonia.   No right upper quadrant tenderness doubt a intra-abdominal injury.    4:53 PM Patient seen and examined at bedside.  Ordered for Dx-Ribs Unilateral and EKG to evaluate. Patient was treated with Morphine 4 mg and Zofran ODT 4 mg for her symptoms.    5:57 PM - The patient reevaluated at bedside. She is endorsing continued pain. The imaging results showed no signs of a fracture. I informed the patient that her it may take a few weeks for her pain to fully subside. I advised her to take Ibuprofen and Tylenol for pain.  Patient is advised to continue take deep breaths.  If she develops any blood in her sputum or fever or any difficulty breathing she is to return.  Patient understands a follow-up plan.    I reviewed prescription monitoring program for patient's narcotic use before prescribing a scheduled drug.The patient will not drink alcohol nor drive with prescribed medications. The patient will return for new or worsening symptoms and is stable at the time of discharge.    The patient is referred to a primary physician for blood pressure management, diabetic screening, and for all other preventative health concerns.    In prescribing controlled substances to this patient, I certify that I have obtained and reviewed the medical history of Kristen Luna. I have also made a good ivy effort to obtain applicable records from other providers who have treated the patient and no other records are available at this time.     I have conducted a physical exam and documented it. I have reviewed Ms. Luna’s prescription history as maintained by the [place] Prescription Monitoring Program.     I have assessed the patient’s risk for abuse, dependency, and addiction using the validated Opioid Risk Tool available at https://www.mdcalc.com/snhpjl-ffsz-smjn-ort-narcotic-abuse.     Given the above, I believe the benefits of controlled substance therapy outweigh the risks. The reasons for prescribing controlled substances include  non-narcotic, oral analgesic alternatives have been inadequate for pain control. Accordingly, I have discussed the risk and benefits, treatment plan, and alternative therapies with the patient.         DISPOSITION:  Patient will be discharged home in stable condition.    FOLLOW UP:  Colette Tinsley M.D.  [address]    [place] [address]  [phone]    Schedule an appointment as soon as possible for a visit   If symptoms worsen, return to the er.      OUTPATIENT MEDICATIONS:  New Prescriptions    OXYCODONE-ACETAMINOPHEN (PERCOCET) 5-325 MG TAB    Take 1 Tab by mouth every four hours as needed for Severe Pain for up to 3 days.          FINAL IMPRESSION  1. Rib pain on right side          IEmilia (Scribe), am scribing for, and in the presence of, Enid Nance M.D..    Electronically signed by: Emilia Lara (Scribe), 9/5/2019    IEnid M.D. personally performed the services described in this documentation, as scribed by Emilia Lara in my presence, and it is both accurate and complete.  C  The note accurately reflects work and decisions made by me.  Enid Nance  9/5/2019  11:36 PM

## 2019-09-05 NOTE — ED TRIAGE NOTES
Kristen Luna  Chief Complaint   Patient presents with   • Rib Pain     right side, injured Friday, increasing in pain   • Shortness of Breath     Pt ambulatory to triage with above complaint.  VSS, no acute distress. States injured right rib last Friday leaning over recliner chair,  Today increased pain and SOB started.  Pt taken for EKG, then  Pt returned to [facility], educated on triage process, and to inform staff of any changes or concerns.

## 2019-09-14 ENCOUNTER — APPOINTMENT (OUTPATIENT)
Dept: RADIOLOGY | Facility: MEDICAL CENTER | Age: 34
End: 2019-09-14
Attending: EMERGENCY MEDICINE
Payer: MEDICAID

## 2019-09-14 ENCOUNTER — HOSPITAL ENCOUNTER (EMERGENCY)
Facility: MEDICAL CENTER | Age: 34
End: 2019-09-14
Attending: EMERGENCY MEDICINE
Payer: MEDICAID

## 2019-09-14 VITALS
WEIGHT: 130 LBS | RESPIRATION RATE: 20 BRPM | OXYGEN SATURATION: 98 % | HEIGHT: 62 IN | HEART RATE: 70 BPM | SYSTOLIC BLOOD PRESSURE: 100 MMHG | BODY MASS INDEX: 23.92 KG/M2 | DIASTOLIC BLOOD PRESSURE: 64 MMHG | TEMPERATURE: 99 F

## 2019-09-14 DIAGNOSIS — G43.001 MIGRAINE WITHOUT AURA AND WITH STATUS MIGRAINOSUS, NOT INTRACTABLE: ICD-10-CM

## 2019-09-14 DIAGNOSIS — R40.0 SOMNOLENCE: ICD-10-CM

## 2019-09-14 LAB
ALBUMIN SERPL BCP-MCNC: 3.8 G/DL (ref 3.2–4.9)
ALBUMIN/GLOB SERPL: 1.8 G/DL
ALP SERPL-CCNC: 55 U/L (ref 30–99)
ALT SERPL-CCNC: 9 U/L (ref 2–50)
AMPHET UR QL SCN: NEGATIVE
ANION GAP SERPL CALC-SCNC: 8 MMOL/L (ref 0–11.9)
AST SERPL-CCNC: 12 U/L (ref 12–45)
BARBITURATES UR QL SCN: NEGATIVE
BASOPHILS # BLD AUTO: 0.4 % (ref 0–1.8)
BASOPHILS # BLD: 0.02 K/UL (ref 0–0.12)
BENZODIAZ UR QL SCN: NEGATIVE
BILIRUB SERPL-MCNC: 0.5 MG/DL (ref 0.1–1.5)
BUN SERPL-MCNC: 11 MG/DL (ref 8–22)
BZE UR QL SCN: NEGATIVE
CALCIUM SERPL-MCNC: 8.3 MG/DL (ref 8.5–10.5)
CANNABINOIDS UR QL SCN: NEGATIVE
CHLORIDE SERPL-SCNC: 110 MMOL/L (ref 96–112)
CO2 SERPL-SCNC: 22 MMOL/L (ref 20–33)
CREAT SERPL-MCNC: 0.59 MG/DL (ref 0.5–1.4)
EOSINOPHIL # BLD AUTO: 0.04 K/UL (ref 0–0.51)
EOSINOPHIL NFR BLD: 0.8 % (ref 0–6.9)
ERYTHROCYTE [DISTWIDTH] IN BLOOD BY AUTOMATED COUNT: 43.8 FL (ref 35.9–50)
ETHANOL BLD-MCNC: 0.01 G/DL
GLOBULIN SER CALC-MCNC: 2.1 G/DL (ref 1.9–3.5)
GLUCOSE SERPL-MCNC: 89 MG/DL (ref 65–99)
HCG SERPL QL: NEGATIVE
HCT VFR BLD AUTO: 38.2 % (ref 37–47)
HGB BLD-MCNC: 12.9 G/DL (ref 12–16)
IMM GRANULOCYTES # BLD AUTO: 0.01 K/UL (ref 0–0.11)
IMM GRANULOCYTES NFR BLD AUTO: 0.2 % (ref 0–0.9)
LYMPHOCYTES # BLD AUTO: 1.62 K/UL (ref 1–4.8)
LYMPHOCYTES NFR BLD: 31.3 % (ref 22–41)
MCH RBC QN AUTO: 31.5 PG (ref 27–33)
MCHC RBC AUTO-ENTMCNC: 33.8 G/DL (ref 33.6–35)
MCV RBC AUTO: 93.4 FL (ref 81.4–97.8)
METHADONE UR QL SCN: NEGATIVE
MONOCYTES # BLD AUTO: 0.52 K/UL (ref 0–0.85)
MONOCYTES NFR BLD AUTO: 10 % (ref 0–13.4)
NEUTROPHILS # BLD AUTO: 2.97 K/UL (ref 2–7.15)
NEUTROPHILS NFR BLD: 57.3 % (ref 44–72)
NRBC # BLD AUTO: 0 K/UL
NRBC BLD-RTO: 0 /100 WBC
OPIATES UR QL SCN: NEGATIVE
OXYCODONE UR QL SCN: NEGATIVE
PCP UR QL SCN: NEGATIVE
PLATELET # BLD AUTO: 164 K/UL (ref 164–446)
PMV BLD AUTO: 10.5 FL (ref 9–12.9)
POTASSIUM SERPL-SCNC: 3.3 MMOL/L (ref 3.6–5.5)
PROPOXYPH UR QL SCN: NEGATIVE
PROT SERPL-MCNC: 5.9 G/DL (ref 6–8.2)
RBC # BLD AUTO: 4.09 M/UL (ref 4.2–5.4)
SODIUM SERPL-SCNC: 140 MMOL/L (ref 135–145)
WBC # BLD AUTO: 5.2 K/UL (ref 4.8–10.8)

## 2019-09-14 PROCEDURE — 36415 COLL VENOUS BLD VENIPUNCTURE: CPT

## 2019-09-14 PROCEDURE — 84703 CHORIONIC GONADOTROPIN ASSAY: CPT

## 2019-09-14 PROCEDURE — 700111 HCHG RX REV CODE 636 W/ 250 OVERRIDE (IP): Performed by: EMERGENCY MEDICINE

## 2019-09-14 PROCEDURE — 96375 TX/PRO/DX INJ NEW DRUG ADDON: CPT

## 2019-09-14 PROCEDURE — 700102 HCHG RX REV CODE 250 W/ 637 OVERRIDE(OP): Performed by: EMERGENCY MEDICINE

## 2019-09-14 PROCEDURE — 99284 EMERGENCY DEPT VISIT MOD MDM: CPT

## 2019-09-14 PROCEDURE — 80307 DRUG TEST PRSMV CHEM ANLYZR: CPT

## 2019-09-14 PROCEDURE — A9270 NON-COVERED ITEM OR SERVICE: HCPCS | Performed by: EMERGENCY MEDICINE

## 2019-09-14 PROCEDURE — 70450 CT HEAD/BRAIN W/O DYE: CPT

## 2019-09-14 PROCEDURE — 85025 COMPLETE CBC W/AUTO DIFF WBC: CPT

## 2019-09-14 PROCEDURE — 80053 COMPREHEN METABOLIC PANEL: CPT

## 2019-09-14 PROCEDURE — 96374 THER/PROPH/DIAG INJ IV PUSH: CPT

## 2019-09-14 RX ORDER — PROCHLORPERAZINE EDISYLATE 5 MG/ML
10 INJECTION INTRAMUSCULAR; INTRAVENOUS ONCE
Status: COMPLETED | OUTPATIENT
Start: 2019-09-14 | End: 2019-09-14

## 2019-09-14 RX ORDER — DIPHENHYDRAMINE HYDROCHLORIDE 50 MG/ML
12.5 INJECTION INTRAMUSCULAR; INTRAVENOUS ONCE
Status: COMPLETED | OUTPATIENT
Start: 2019-09-14 | End: 2019-09-14

## 2019-09-14 RX ORDER — TRAZODONE HYDROCHLORIDE 50 MG/1
50 TABLET ORAL NIGHTLY
Status: ON HOLD | COMMUNITY
End: 2020-10-02

## 2019-09-14 RX ORDER — ACETAMINOPHEN 325 MG/1
650 TABLET ORAL ONCE
Status: COMPLETED | OUTPATIENT
Start: 2019-09-14 | End: 2019-09-14

## 2019-09-14 RX ADMIN — DIPHENHYDRAMINE HYDROCHLORIDE 12.5 MG: 50 INJECTION INTRAMUSCULAR; INTRAVENOUS at 17:30

## 2019-09-14 RX ADMIN — ACETAMINOPHEN 650 MG: 325 TABLET, FILM COATED ORAL at 16:02

## 2019-09-14 RX ADMIN — PROCHLORPERAZINE EDISYLATE 10 MG: 5 INJECTION INTRAMUSCULAR; INTRAVENOUS at 17:34

## 2019-09-14 NOTE — ED NOTES
Pt presents to ED via EMS for initial reports of ALOC. Per report pt was at the air races when she fell asleep on her significant others shoulder. Significant other reported that pt was hard to arouse. Pt is mildly drowsy but alert and oriented at this time. Pt denied alcohol use today. BS 91. PIV in place. 700ml IVF given. Pt reports a HA currently and R rib pain she states is from trying to get the recliner to move the other day. Pt placed on monitor. Blood sent from IV if needed.

## 2019-09-14 NOTE — ED PROVIDER NOTES
ED Provider Note    Scribed for Miguel Ward M.D. by Asa Chawla. 9/14/2019  2:39 PM    Primary care provider: Colette Tinsley M.D.  Means of arrival: EMS  History obtained from: Patient  History limited by: None    CHIEF COMPLAINT  Chief Complaint   Patient presents with   • ALOC   • Headache   • Rib Pain     R side       HPI  Kristen Luna is a 34 y.o. Female with a history of migraines, who presents to the Emergency Department for evaluation of a headache onset today. Per , they were at the [employer] when the patient felt overheated. Her  gave her water and reports that she was coherent. The patient states that she had a sudden headache and rates it a 7/10 in severity.  Her  stated it seemed to begin with only mild pain.  She endorses associated photophobia, but denies any cough, vomiting, nausea, or swelling. She reports that she has never had a headache like this before. Additionally, she states that she has had abdominal pain onset a few days ago. 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 She reports of one episode of diarrhea yesterday. The patient denies being pregnant. She is diabetic. She has no past medical history of aneurysms. Patient denies any drug use.     REVIEW OF SYSTEMS  Pertinent positives include: photophobia, headache  Pertinent negatives include: cough, vomiting, nausea, or swelling.  10+ systems reviewed and negative.      PAST MEDICAL HISTORY  Past Medical History:   Diagnosis Date   • Arrhythmia     SVT no episodes since 1997   • Bipolar affective disorder (HCC)    • Breath shortness     at [id number]; primary MD aware   • Gastroparesis    • Heart burn    • Hypothyroid    • Pain     Left knee 5/10   • PTSD (post-traumatic stress disorder)        FAMILY HISTORY  Family History   Problem Relation Age of Onset   • Diabetes Other    • Heart Disease Mother        SOCIAL HISTORY  Social History     Tobacco Use   • Smoking  "status: Former Smoker     Packs/day: 0.50     Years: 1.50     Pack years: 0.75     Last attempt to quit: 8/26/2009     Years since quitting: 10.0   • Smokeless tobacco: Never Used   • Tobacco comment: one pack every 3months   Substance Use Topics   • Alcohol use: No   • Drug use: No     Social History     Substance and Sexual Activity   Drug Use No       SURGICAL HISTORY  Past Surgical History:   Procedure Laterality Date   • KNEE ARTHROSCOPY  1/4/2010    Performed by KRYSTIAN COSTA at [facility] ORS   • SYNOVECTOMY  1/4/2010    Performed by KRYSTIAN COSTA at [facility] ORS   • MEDIAL MENISCECTOMY  1/4/2010    Performed by KRYSTIAN COSTA at [facility] ORS   • KNEE ARTHROSCOPY  10/2009    left   • DENTAL EXTRACTION(S)  2007    wisdom teeth       CURRENT MEDICATIONS  Home Medications     Reviewed by Chanel Thomas R.N. (Registered Nurse) on 09/14/19 at 1432  Med List Status: Partial   Medication Last Dose Status   Gabapentin (NEURONTIN PO) 9/13/2019 Active   levothyroxine (SYNTHROID) 75 MCG Tab 9/14/2019 Active   Lurasidone HCl (LATUDA PO) 9/13/2019 Active   prazosin (MINIPRESS) 1 MG Cap 9/13/2019 Active   traZODone (DESYREL) 50 MG Tab 9/13/2019 Active                ALLERGIES  Allergies   Allergen Reactions   • Codeine    • Hydrocodone Itching   • Naprosyn [Naproxen] Hives   • Zoloft      Left hand swelling   • Fentanyl Photosensitivity     Reports blurred vision        PHYSICAL EXAM  VITAL SIGNS: /73   Pulse 80   Temp 37.7 °C (99.8 °F) (Temporal)   Resp 19   Ht 1.575 m (5' 2\")   Wt 59 kg (130 lb)   LMP 08/26/2019 (Approximate)   SpO2 98%   BMI 23.78 kg/m²   Reviewed and normal including afebrile  Constitutional: Well developed, Well nourished, Sleepy.  HENT: Normocephalic, atraumatic, bilateral external ears normal, oropharynx moist, No exudates or erythema.   Eyes: PERRLA, conjunctiva pink, no scleral icterus.   Cardiovascular: Regular rate and rhythm. No " murmurs, rubs or gallops.   Respiratory: Lungs clear to auscultation bilaterally. No wheezes, rales, or rhonchi.   Abdominal:  Abdomen soft, non-tender, non distended. No rebound, or guarding.    Skin: No erythema, no rash.   Genitourinary: No costovertebral angle tenderness.   Musculoskeletal:  No edema.   Neurologic: Arouses to voice, Alert & oriented x 3, cranial nerves 2-12 intact by passive exam.  No focal deficit noted.  Psychiatric: Affect normal, Judgment normal, Mood normal.     DIFFERENTIAL DIAGNOSIS:  Differential diagnoses include but not limited to: migraine vs headache vs drug or alcohol use vs common heat exhaustion vs subarachnoid hemorrhage.     RADIOLOGY/PROCEDURES  CT-HEAD W/O   Final Result      No acute intracranial abnormality.        Radiologist interpretation have been reviewed by me.     LABORATORY:  Results for orders placed or performed during the hospital encounter of 09/14/19   CBC WITH DIFFERENTIAL   Result Value Ref Range    WBC 5.2 4.8 - 10.8 K/uL    RBC 4.09 (L) 4.20 - 5.40 M/uL    Hemoglobin 12.9 12.0 - 16.0 g/dL    Hematocrit 38.2 37.0 - 47.0 %    MCV 93.4 81.4 - 97.8 fL    MCH 31.5 27.0 - 33.0 pg    MCHC 33.8 33.6 - 35.0 g/dL    RDW 43.8 35.9 - 50.0 fL    Platelet Count 164 164 - 446 K/uL    MPV 10.5 9.0 - 12.9 fL    Neutrophils-Polys 57.30 44.00 - 72.00 %    Lymphocytes 31.30 22.00 - 41.00 %    Monocytes 10.00 0.00 - 13.40 %    Eosinophils 0.80 0.00 - 6.90 %    Basophils 0.40 0.00 - 1.80 %    Immature Granulocytes 0.20 0.00 - 0.90 %    Nucleated RBC 0.00 /100 WBC    Neutrophils (Absolute) 2.97 2.00 - 7.15 K/uL    Lymphs (Absolute) 1.62 1.00 - 4.80 K/uL    Monos (Absolute) 0.52 0.00 - 0.85 K/uL    Eos (Absolute) 0.04 0.00 - 0.51 K/uL    Baso (Absolute) 0.02 0.00 - 0.12 K/uL    Immature Granulocytes (abs) 0.01 0.00 - 0.11 K/uL    NRBC (Absolute) 0.00 K/uL   Comp Metabolic Panel   Result Value Ref Range    Sodium 140 135 - 145 mmol/L    Potassium 3.3 (L) 3.6 - 5.5 mmol/L    Chloride  110 96 - 112 mmol/L    Co2 22 20 - 33 mmol/L    Anion Gap 8.0 0.0 - 11.9    Glucose 89 65 - 99 mg/dL    Bun 11 8 - 22 mg/dL    Creatinine 0.59 0.50 - 1.40 mg/dL    Calcium 8.3 (L) 8.5 - 10.5 mg/dL    AST(SGOT) 12 12 - 45 U/L    ALT(SGPT) 9 2 - 50 U/L    Alkaline Phosphatase 55 30 - 99 U/L    Total Bilirubin 0.5 0.1 - 1.5 mg/dL    Albumin 3.8 3.2 - 4.9 g/dL    Total Protein 5.9 (L) 6.0 - 8.2 g/dL    Globulin 2.1 1.9 - 3.5 g/dL    A-G Ratio 1.8 g/dL   HCG QUAL SERUM   Result Value Ref Range    Beta-Hcg Qualitative Serum Negative Negative   DIAGNOSTIC ALCOHOL   Result Value Ref Range    Diagnostic Alcohol 0.01 (H) 0.00 g/dL       Lab results reviewed by me.     INTERVENTIONS:  Medications   acetaminophen (TYLENOL) tablet 650 mg (650 mg Oral Given 9/14/19 1602)   prochlorperazine (COMPAZINE) injection 10 mg (10 mg Intravenous Given 9/14/19 1734)   diphenhydrAMINE (BENADRYL) injection 12.5 mg (12.5 mg Intravenous Given 9/14/19 1730)     Response: Improvement in resolution of headache with Compazine but not Tylenol.    ED COURSE:  Nursing notes, VS, PMSFHx reviewed in chart.     2:39 PM - Patient seen and examined at bedside.  Ordered CT-Head w/o, estimated GFR, diagnostic alcohol, urine drug screen, hCG qual serum, CMP, and CBC with differential to evaluate.     3:57 PM - Patient will be treated with Tylenol 650 mg for her symptoms.    MEDICAL DECISION MAKING:  This patient with a history of headaches presents with a moderate severity headache that is not the worst of life.  There was some discrepancy between the patient and her significant other regarding the suddenness of onset.  Patient has a history of either Danlos connective tissue disorder.  Given this I thought it was prudent to exclude subarachnoid hemorrhage which was not evident based on CT.  She likely had a migraine headache given good response to Compazine for her pain.  Somnolence is of unclear etiology.  It is possible that patient had some heat  exhaustion today.  There is no evidence of intoxication.  After treatment for her headache she was alert awake and at baseline.  I do not believe the patient needs a follow-up lumbar puncture to further exclude subarachnoid hemorrhage.  She presented within the first 6 hours of symptoms.    PLAN:  Ibuprofen and Tylenol at first sign of headache  Headache handout given  Return for thunderclap headache, headache and fever headache and neurologic deficit    Colette Tinsley M.D.  [address]    [place] [address]  [phone]    Schedule an appointment as soon as possible for a visit   As needed if not better monday      CONDITION: Stable, improved.     FINAL IMPRESSION  1. Migraine without aura and with status migrainosus, not intractable    2. Somnolence          Asa HERNANDEZ (Scribe), am scribing for, and in the presence of, Miguel Ward M.D..    Electronically signed by: Asa Chawla (Scribe), 9/14/2019    Miguel HERNANDEZ M.D. personally performed the services described in this documentation, as scribed by Asa Chawla in my presence, and it is both accurate and complete.C.    The note accurately reflects work and decisions made by me.  Miguel Ward  9/15/2019  2:38 PM

## 2019-09-15 NOTE — ED NOTES
PIV removed. Catheter intact. Dressing applied. Bleeding controlled. D/C instructions reviewed with pt. Pt states understanding and need for follow-up. Pt left ambulatory with a steady gait. Pt denies any pain at this time. Pt will call a cab for ride home. Significant other at side.

## 2019-09-15 NOTE — DISCHARGE INSTRUCTIONS
Headache, General, Without Cause    Take ibuprofen and Tylenol immediately on the onset of headache.  Followup if not better in 2 days.  Return for sudden severe headache, headache and fever or headache and weakness.    You had a borderline or high normal blood pressure reading today.  This does not necessarily mean you have hypertension.  Please followup with your/a primary physician for comprehensive blood pressure evaluation and yearly fasting cholesterol assessment.  BP Readings from Last 3 Encounters:   09/14/19 116/73   09/05/19 135/72   08/16/19 118/76         You are having a headache. No specific cause was found today for your headache. Stress and depression are common triggers for these headaches. These headaches are not life threatening. It will not lead to other types of headaches. Headaches can be diagnosed by a patient history and a physical exam. Sometimes, further lab and x-ray studies are used to rule out more serious problems. Your caregiver can help you solve personal problems causing stress. Medication may be prescribed if depression is a problem.    HOME CARE INSTRUCTIONS  If testing was done, call for your results. Remember, it is your responsibility to get the results of all testing. Do not assume everything is fine because you do not hear from your caregiver. It is also your responsibility to keep any follow-up appointments with your caregiver, or any specialist referral.  You may use acetaminophen (Tylenol®), ibuprofen (Advil® or Motrin®), or aspirin as needed for pain and inflammation. Use these if your caregiver has not given medications which would interfere with this.  Biofeedback, massage, or other relaxation techniques may be helpful.  Ice packs or heat applied to the head and neck can be used. Do this three to four times per day, or as needed.  Physical therapy may be a useful addition to treatment.  If headaches continue, even with therapy, you may need to think about lifestyle  changes.    SEEK MEDICAL ATTENTION IF:  You develop problems with medications prescribed.  You do not respond to or obtain relief from medications.  You have a change from the usual headache.  You develop nausea or vomiting.    SEEK IMMEDIATE MEDICAL ATTENTION IF:   If your headache becomes severe.  You have an unexplained oral temperature above 102° F (38.9° C), or as your caregiver suggests.  You have a stiff neck.  You have loss of vision.  You have muscular weakness.   You have loss of muscular control.  You develop severe symptoms different from your first symptoms.  You start losing your balance or have trouble walking.  You feel faint or pass out.    AGREEMENT BETWEEN PATIENT AND HEALTHCARE TEAM:  Your signature on this document represents an understanding between you and the healthcare team that took care of you today.  That means that you:  Understand these discharge instructions.   Will monitor your condition.  Will seek immediate medical attention as instructed.    Document Released: 12/18/2006  Document Re-Released: 06/30/2008  [employer]® Patient Information ©2008 [employer], [employer].

## 2019-10-03 ENCOUNTER — HOSPITAL ENCOUNTER (OUTPATIENT)
Dept: LAB | Facility: MEDICAL CENTER | Age: 34
End: 2019-10-03
Attending: STUDENT IN AN ORGANIZED HEALTH CARE EDUCATION/TRAINING PROGRAM
Payer: MEDICAID

## 2019-10-03 LAB — VALPROATE SERPL-MCNC: 87.4 UG/ML (ref 50–100)

## 2019-10-03 PROCEDURE — 36415 COLL VENOUS BLD VENIPUNCTURE: CPT

## 2019-10-03 PROCEDURE — 80164 ASSAY DIPROPYLACETIC ACD TOT: CPT

## 2020-02-02 ENCOUNTER — HOSPITAL ENCOUNTER (OUTPATIENT)
Facility: MEDICAL CENTER | Age: 35
End: 2020-02-02
Attending: PHYSICIAN ASSISTANT
Payer: MEDICAID

## 2020-02-02 ENCOUNTER — OFFICE VISIT (OUTPATIENT)
Dept: URGENT CARE | Facility: CLINIC | Age: 35
End: 2020-02-02
Payer: MEDICAID

## 2020-02-02 ENCOUNTER — TELEPHONE (OUTPATIENT)
Dept: URGENT CARE | Facility: CLINIC | Age: 35
End: 2020-02-02

## 2020-02-02 VITALS
WEIGHT: 156 LBS | OXYGEN SATURATION: 99 % | BODY MASS INDEX: 27.64 KG/M2 | TEMPERATURE: 98.4 F | HEART RATE: 68 BPM | HEIGHT: 63 IN | SYSTOLIC BLOOD PRESSURE: 110 MMHG | DIASTOLIC BLOOD PRESSURE: 78 MMHG | RESPIRATION RATE: 18 BRPM

## 2020-02-02 DIAGNOSIS — N92.1 MENORRHAGIA WITH IRREGULAR CYCLE: ICD-10-CM

## 2020-02-02 LAB
APPEARANCE UR: CLEAR
BASOPHILS # BLD AUTO: 0.4 % (ref 0–1.8)
BASOPHILS # BLD: 0.02 K/UL (ref 0–0.12)
BILIRUB UR STRIP-MCNC: NEGATIVE MG/DL
COLOR UR AUTO: NORMAL
EOSINOPHIL # BLD AUTO: 0.07 K/UL (ref 0–0.51)
EOSINOPHIL NFR BLD: 1.4 % (ref 0–6.9)
ERYTHROCYTE [DISTWIDTH] IN BLOOD BY AUTOMATED COUNT: 45.6 FL (ref 35.9–50)
GLUCOSE UR STRIP.AUTO-MCNC: NEGATIVE MG/DL
HCT VFR BLD AUTO: 42 % (ref 37–47)
HGB BLD-MCNC: 13.7 G/DL (ref 12–16)
IMM GRANULOCYTES # BLD AUTO: 0.01 K/UL (ref 0–0.11)
IMM GRANULOCYTES NFR BLD AUTO: 0.2 % (ref 0–0.9)
INT CON NEG: NEGATIVE
INT CON POS: POSITIVE
KETONES UR STRIP.AUTO-MCNC: NEGATIVE MG/DL
LEUKOCYTE ESTERASE UR QL STRIP.AUTO: NEGATIVE
LYMPHOCYTES # BLD AUTO: 1.63 K/UL (ref 1–4.8)
LYMPHOCYTES NFR BLD: 32.1 % (ref 22–41)
MCH RBC QN AUTO: 32.2 PG (ref 27–33)
MCHC RBC AUTO-ENTMCNC: 32.6 G/DL (ref 33.6–35)
MCV RBC AUTO: 98.8 FL (ref 81.4–97.8)
MONOCYTES # BLD AUTO: 0.5 K/UL (ref 0–0.85)
MONOCYTES NFR BLD AUTO: 9.8 % (ref 0–13.4)
NEUTROPHILS # BLD AUTO: 2.85 K/UL (ref 2–7.15)
NEUTROPHILS NFR BLD: 56.1 % (ref 44–72)
NITRITE UR QL STRIP.AUTO: NEGATIVE
NRBC # BLD AUTO: 0 K/UL
NRBC BLD-RTO: 0 /100 WBC
PH UR STRIP.AUTO: 6 [PH] (ref 5–8)
PLATELET # BLD AUTO: 172 K/UL (ref 164–446)
PMV BLD AUTO: 10.9 FL (ref 9–12.9)
POC URINE PREGNANCY TEST: NEGATIVE
PROT UR QL STRIP: NEGATIVE MG/DL
RBC # BLD AUTO: 4.25 M/UL (ref 4.2–5.4)
RBC UR QL AUTO: NORMAL
SP GR UR STRIP.AUTO: 1.03
UROBILINOGEN UR STRIP-MCNC: 0.2 MG/DL
WBC # BLD AUTO: 5.1 K/UL (ref 4.8–10.8)

## 2020-02-02 PROCEDURE — 99213 OFFICE O/P EST LOW 20 MIN: CPT | Mod: 25 | Performed by: PHYSICIAN ASSISTANT

## 2020-02-02 PROCEDURE — 81025 URINE PREGNANCY TEST: CPT | Performed by: PHYSICIAN ASSISTANT

## 2020-02-02 PROCEDURE — 81002 URINALYSIS NONAUTO W/O SCOPE: CPT | Performed by: PHYSICIAN ASSISTANT

## 2020-02-02 PROCEDURE — 85025 COMPLETE CBC W/AUTO DIFF WBC: CPT

## 2020-02-02 RX ORDER — OLANZAPINE 5 MG/1
TABLET ORAL
Status: ON HOLD | COMMUNITY
Start: 2019-12-15 | End: 2020-10-02

## 2020-02-02 ASSESSMENT — ENCOUNTER SYMPTOMS
CHILLS: 0
EYE PAIN: 0
DIARRHEA: 0
MYALGIAS: 0
FEVER: 0
CONSTIPATION: 0
VOMITING: 0
SHORTNESS OF BREATH: 0
SORE THROAT: 0
BLOOD IN STOOL: 0
COUGH: 0
DIZZINESS: 1
BLURRED VISION: 0
PALPITATIONS: 0
ABDOMINAL PAIN: 1
NAUSEA: 0
FLANK PAIN: 0

## 2020-02-02 NOTE — PROGRESS NOTES
Subjective:      Kristen Luna is a 34 y.o. female who presents with Menstrual Problem (x2weeks- heavy.This has never happened before,usually only 3 days. This started after starting birth control but she has now stop taking them)    HPI:  This is a new problem. Kristen Luna is a 34 y.o. female who presents today for evaluation of abnormal menstrual cycle.  Patient says that 2 weeks ago she started a new birth control pill.  She said within a day or 2 she started having her menstrual cycle.  She said that she was due for anyways, however, did not think much of it.  She says normally her cycles last for 3 days.  She said on day 3 she said when she was not stopping she started a little concerned.  She said she continue the birth control for a week and then when the cycle still continued she stopped taking the birth control.  She said she has continued to have a cycle up until now.  She said that she has only gone through 0.5 boxes of tampons within the past 1.5 weeks.  She feels a little bit more tired than normal and says occasionally she is felt lightheaded.  She does not feel lightheaded or dizzy right now.  She was prescribed the birth control by her family doctor but has not told him about her symptoms and has not tried to follow-up with them.  She has mild intermittent abdominal cramping but nothing severe.  No urinary symptoms or abnormal vaginal discharge.      Review of Systems   Constitutional: Positive for malaise/fatigue. Negative for chills and fever.   HENT: Negative for congestion and sore throat.    Eyes: Negative for blurred vision and pain.   Respiratory: Negative for cough and shortness of breath.    Cardiovascular: Negative for chest pain and palpitations.   Gastrointestinal: Positive for abdominal pain. Negative for blood in stool, constipation, diarrhea, melena, nausea and vomiting.   Genitourinary: Positive for menstrual problem. Negative for dysuria, flank pain, frequency, hematuria  and urgency.        Abnormal vaginal bleeding   Musculoskeletal: Negative for myalgias.   Skin: Negative for rash.   Neurological: Positive for dizziness (intermittently, not currently).       PMH:  has a past medical history of Arrhythmia, Bipolar affective disorder (HCC), Breath shortness, Gastroparesis, Heart burn, Hypothyroid, Pain, and PTSD (post-traumatic stress disorder).  MEDS:   Current Outpatient Medications:   •  OLANZapine (ZYPREXA) 5 MG Tab, OLANZAPINE 5 MG TABS, Disp: , Rfl:   •  levothyroxine (SYNTHROID) 75 MCG Tab, Take 75 mcg by mouth Every morning on an empty stomach., Disp: , Rfl:   •  prazosin (MINIPRESS) 1 MG Cap, Take 1 mg by mouth every evening., Disp: , Rfl:   •  Gabapentin (NEURONTIN PO), Take 300 mg by mouth every bedtime., Disp: , Rfl:   •  traZODone (DESYREL) 50 MG Tab, Take 50 mg by mouth every evening., Disp: , Rfl:   •  Lurasidone HCl (LATUDA PO), Take 1 Tab by mouth every bedtime., Disp: , Rfl:   ALLERGIES:   Allergies   Allergen Reactions   • Codeine    • Hydrocodone Itching   • Naprosyn [Naproxen] Hives   • Reglan  [Metoclopramide]    • Zoloft      Left hand swelling   • Fentanyl Photosensitivity     Reports blurred vision      SURGHX:   Past Surgical History:   Procedure Laterality Date   • KNEE ARTHROSCOPY  1/4/2010    Performed by KRYSTIAN COSTA at [facility] ORS   • SYNOVECTOMY  1/4/2010    Performed by KRYSTIAN COSTA at [facility] ORS   • MEDIAL MENISCECTOMY  1/4/2010    Performed by KRYSTIAN COSTA at [facility] ORS   • KNEE ARTHROSCOPY  10/2009    left   • DENTAL EXTRACTION(S)  2007    wisdom teeth     SOCHX:  reports that she quit smoking about 10 years ago. She has a 0.75 pack-year smoking history. She has never used smokeless tobacco. She reports that she does not drink alcohol or use drugs.  FH: Family history was reviewed, no pertinent findings to report     Objective:     /78 (BP Location: Left arm)   Pulse 68   Temp  "36.9 °C (98.4 °F) (Temporal)   Resp 18   Ht 1.6 m (5' 3\")   Wt 70.8 kg (156 lb)   LMP 02/02/2020   SpO2 99%   BMI 27.63 kg/m²      Physical Exam  Constitutional:       Appearance: She is well-developed.   HENT:      Head: Normocephalic and atraumatic.      Right Ear: External ear normal.      Left Ear: External ear normal.   Eyes:      Conjunctiva/sclera: Conjunctivae normal.      Pupils: Pupils are equal, round, and reactive to light.   Neck:      Musculoskeletal: Normal range of motion.   Cardiovascular:      Rate and Rhythm: Normal rate and regular rhythm.      Heart sounds: Normal heart sounds. No murmur.   Pulmonary:      Effort: Pulmonary effort is normal.      Breath sounds: Normal breath sounds. No wheezing.   Abdominal:      General: Abdomen is flat.      Palpations: Abdomen is soft.      Tenderness: There is no tenderness.   Lymphadenopathy:      Cervical: No cervical adenopathy.   Skin:     General: Skin is warm and dry.      Capillary Refill: Capillary refill takes less than 2 seconds.   Neurological:      Mental Status: She is alert and oriented to person, place, and time.   Psychiatric:         Behavior: Behavior normal.         Judgment: Judgment normal.            Assessment/Plan:       1. Menorrhagia with irregular cycle  - CBC WITH DIFFERENTIAL; Future  *Discussed with patient that the amount of bleeding she has had does not sound overly heavy to me.  It sounds like the abnormal cycle could be due to her starting her birth control.  Advised that it can take months sometimes for menstrual cycles to regulate after starting birth control.  She needs to follow-up with her family doctor to go over this further.  Will place referral to gynecology as well.  We will check a CBC to make sure she is not anemic.  Otherwise offered reassurance.          Differential Diagnosis, natural history, and supportive care discussed. Return to the Urgent Care or follow up with your PCP if symptoms fail to resolve, " or for any new or worsening symptoms. Emergency room precautions discussed. Patient and/or family appears understanding of information.

## 2020-02-02 NOTE — LETTER
February 2, 2020         Patient: Kristen Luna   YOB: 1985   Date of Visit: 2/2/2020           To Whom it May Concern:    Kristen Luna was seen in my clinic on 2/2/2020. She may return to work on 2/3/2020.    If you have any questions or concerns, please don't hesitate to call.        Sincerely,           Ila Villalobos P.A.-C.  Electronically Signed

## 2020-02-06 ENCOUNTER — HOSPITAL ENCOUNTER (OUTPATIENT)
Dept: LAB | Facility: MEDICAL CENTER | Age: 35
End: 2020-02-06
Attending: STUDENT IN AN ORGANIZED HEALTH CARE EDUCATION/TRAINING PROGRAM
Payer: MEDICAID

## 2020-02-06 LAB
T3 SERPL-MCNC: 139.9 NG/DL (ref 60–181)
T4 FREE SERPL-MCNC: 0.76 NG/DL (ref 0.53–1.43)
TSH SERPL DL<=0.005 MIU/L-ACNC: 4.02 UIU/ML (ref 0.38–5.33)

## 2020-02-06 PROCEDURE — 84480 ASSAY TRIIODOTHYRONINE (T3): CPT

## 2020-02-06 PROCEDURE — 84443 ASSAY THYROID STIM HORMONE: CPT

## 2020-02-06 PROCEDURE — 36415 COLL VENOUS BLD VENIPUNCTURE: CPT

## 2020-02-06 PROCEDURE — 84439 ASSAY OF FREE THYROXINE: CPT

## 2020-04-01 ENCOUNTER — HOSPITAL ENCOUNTER (OUTPATIENT)
Dept: LAB | Facility: MEDICAL CENTER | Age: 35
End: 2020-04-01
Attending: INTERNAL MEDICINE
Payer: MEDICAID

## 2020-04-01 LAB
BASOPHILS # BLD AUTO: 0.4 % (ref 0–1.8)
BASOPHILS # BLD: 0.02 K/UL (ref 0–0.12)
EOSINOPHIL # BLD AUTO: 0.05 K/UL (ref 0–0.51)
EOSINOPHIL NFR BLD: 0.9 % (ref 0–6.9)
ERYTHROCYTE [DISTWIDTH] IN BLOOD BY AUTOMATED COUNT: 39.7 FL (ref 35.9–50)
HCT VFR BLD AUTO: 40.6 % (ref 37–47)
HGB BLD-MCNC: 14.2 G/DL (ref 12–16)
IMM GRANULOCYTES # BLD AUTO: 0.01 K/UL (ref 0–0.11)
IMM GRANULOCYTES NFR BLD AUTO: 0.2 % (ref 0–0.9)
LYMPHOCYTES # BLD AUTO: 1.39 K/UL (ref 1–4.8)
LYMPHOCYTES NFR BLD: 25.3 % (ref 22–41)
MCH RBC QN AUTO: 31.8 PG (ref 27–33)
MCHC RBC AUTO-ENTMCNC: 35 G/DL (ref 33.6–35)
MCV RBC AUTO: 91 FL (ref 81.4–97.8)
MONOCYTES # BLD AUTO: 0.7 K/UL (ref 0–0.85)
MONOCYTES NFR BLD AUTO: 12.8 % (ref 0–13.4)
NEUTROPHILS # BLD AUTO: 3.32 K/UL (ref 2–7.15)
NEUTROPHILS NFR BLD: 60.4 % (ref 44–72)
NRBC # BLD AUTO: 0 K/UL
NRBC BLD-RTO: 0 /100 WBC
PLATELET # BLD AUTO: 159 K/UL (ref 164–446)
PMV BLD AUTO: 10.9 FL (ref 9–12.9)
RBC # BLD AUTO: 4.46 M/UL (ref 4.2–5.4)
WBC # BLD AUTO: 5.5 K/UL (ref 4.8–10.8)

## 2020-04-01 PROCEDURE — 85025 COMPLETE CBC W/AUTO DIFF WBC: CPT

## 2020-04-01 PROCEDURE — 36415 COLL VENOUS BLD VENIPUNCTURE: CPT

## 2020-04-02 ENCOUNTER — HOSPITAL ENCOUNTER (OUTPATIENT)
Facility: MEDICAL CENTER | Age: 35
End: 2020-04-02
Attending: INTERNAL MEDICINE
Payer: MEDICAID

## 2020-04-02 PROCEDURE — 87899 AGENT NOS ASSAY W/OPTIC: CPT

## 2020-04-02 PROCEDURE — 87046 STOOL CULTR AEROBIC BACT EA: CPT

## 2020-04-02 PROCEDURE — 87045 FECES CULTURE AEROBIC BACT: CPT

## 2020-04-03 LAB
E COLI SXT1+2 STL IA: NORMAL
SIGNIFICANT IND 70042: NORMAL
SITE SITE: NORMAL
SOURCE SOURCE: NORMAL

## 2020-04-05 LAB
BACTERIA STL CULT: NORMAL
E COLI SXT1+2 STL IA: NORMAL
SIGNIFICANT IND 70042: NORMAL
SITE SITE: NORMAL
SOURCE SOURCE: NORMAL

## 2020-05-05 ENCOUNTER — HOSPITAL ENCOUNTER (OUTPATIENT)
Dept: LAB | Facility: MEDICAL CENTER | Age: 35
End: 2020-05-05
Attending: OBSTETRICS & GYNECOLOGY
Payer: MEDICAID

## 2020-05-05 LAB
ABO GROUP BLD: NORMAL
BASOPHILS # BLD AUTO: 0.4 % (ref 0–1.8)
BASOPHILS # BLD: 0.03 K/UL (ref 0–0.12)
BLD GP AB SCN SERPL QL: NORMAL
EOSINOPHIL # BLD AUTO: 0.03 K/UL (ref 0–0.51)
EOSINOPHIL NFR BLD: 0.4 % (ref 0–6.9)
ERYTHROCYTE [DISTWIDTH] IN BLOOD BY AUTOMATED COUNT: 46 FL (ref 35.9–50)
HBV SURFACE AG SER QL: ABNORMAL
HCT VFR BLD AUTO: 46 % (ref 37–47)
HGB BLD-MCNC: 15.2 G/DL (ref 12–16)
HIV 1+2 AB+HIV1 P24 AG SERPL QL IA: NORMAL
IMM GRANULOCYTES # BLD AUTO: 0.02 K/UL (ref 0–0.11)
IMM GRANULOCYTES NFR BLD AUTO: 0.3 % (ref 0–0.9)
LYMPHOCYTES # BLD AUTO: 1.76 K/UL (ref 1–4.8)
LYMPHOCYTES NFR BLD: 23.7 % (ref 22–41)
MCH RBC QN AUTO: 31.5 PG (ref 27–33)
MCHC RBC AUTO-ENTMCNC: 33 G/DL (ref 33.6–35)
MCV RBC AUTO: 95.4 FL (ref 81.4–97.8)
MONOCYTES # BLD AUTO: 0.85 K/UL (ref 0–0.85)
MONOCYTES NFR BLD AUTO: 11.4 % (ref 0–13.4)
NEUTROPHILS # BLD AUTO: 4.75 K/UL (ref 2–7.15)
NEUTROPHILS NFR BLD: 63.8 % (ref 44–72)
NRBC # BLD AUTO: 0 K/UL
NRBC BLD-RTO: 0 /100 WBC
PLATELET # BLD AUTO: 184 K/UL (ref 164–446)
PMV BLD AUTO: 11.3 FL (ref 9–12.9)
RBC # BLD AUTO: 4.82 M/UL (ref 4.2–5.4)
RH BLD: NORMAL
RUBV AB SER QL: 159 IU/ML
TREPONEMA PALLIDUM IGG+IGM AB [PRESENCE] IN SERUM OR PLASMA BY IMMUNOASSAY: ABNORMAL
WBC # BLD AUTO: 7.4 K/UL (ref 4.8–10.8)

## 2020-05-05 PROCEDURE — 86780 TREPONEMA PALLIDUM: CPT

## 2020-05-05 PROCEDURE — 86901 BLOOD TYPING SEROLOGIC RH(D): CPT

## 2020-05-05 PROCEDURE — 87340 HEPATITIS B SURFACE AG IA: CPT

## 2020-05-05 PROCEDURE — 36415 COLL VENOUS BLD VENIPUNCTURE: CPT

## 2020-05-05 PROCEDURE — 86850 RBC ANTIBODY SCREEN: CPT

## 2020-05-05 PROCEDURE — 86900 BLOOD TYPING SEROLOGIC ABO: CPT

## 2020-05-05 PROCEDURE — 85025 COMPLETE CBC W/AUTO DIFF WBC: CPT

## 2020-05-05 PROCEDURE — 87389 HIV-1 AG W/HIV-1&-2 AB AG IA: CPT

## 2020-05-05 PROCEDURE — 86762 RUBELLA ANTIBODY: CPT

## 2020-05-09 ENCOUNTER — APPOINTMENT (OUTPATIENT)
Dept: RADIOLOGY | Facility: MEDICAL CENTER | Age: 35
End: 2020-05-09
Attending: EMERGENCY MEDICINE
Payer: MEDICAID

## 2020-05-09 ENCOUNTER — HOSPITAL ENCOUNTER (EMERGENCY)
Facility: MEDICAL CENTER | Age: 35
End: 2020-05-09
Attending: EMERGENCY MEDICINE
Payer: MEDICAID

## 2020-05-09 VITALS
WEIGHT: 149.69 LBS | RESPIRATION RATE: 15 BRPM | DIASTOLIC BLOOD PRESSURE: 69 MMHG | HEART RATE: 78 BPM | TEMPERATURE: 97.5 F | BODY MASS INDEX: 26.52 KG/M2 | SYSTOLIC BLOOD PRESSURE: 127 MMHG | OXYGEN SATURATION: 99 %

## 2020-05-09 DIAGNOSIS — O26.891 ABDOMINAL PAIN DURING PREGNANCY IN FIRST TRIMESTER: ICD-10-CM

## 2020-05-09 DIAGNOSIS — R10.9 ABDOMINAL PAIN DURING PREGNANCY IN FIRST TRIMESTER: ICD-10-CM

## 2020-05-09 DIAGNOSIS — R82.71 BACTERIURIA: ICD-10-CM

## 2020-05-09 LAB
ANION GAP SERPL CALC-SCNC: 12 MMOL/L (ref 7–16)
APPEARANCE UR: CLEAR
B-HCG SERPL-ACNC: ABNORMAL MIU/ML (ref 0–5)
BACTERIA #/AREA URNS HPF: NEGATIVE /HPF
BASOPHILS # BLD AUTO: 0.3 % (ref 0–1.8)
BASOPHILS # BLD: 0.02 K/UL (ref 0–0.12)
BILIRUB UR QL STRIP.AUTO: NEGATIVE
BUN SERPL-MCNC: 7 MG/DL (ref 8–22)
CALCIUM SERPL-MCNC: 9.5 MG/DL (ref 8.5–10.5)
CHLORIDE SERPL-SCNC: 104 MMOL/L (ref 96–112)
CO2 SERPL-SCNC: 20 MMOL/L (ref 20–33)
COLOR UR: YELLOW
CREAT SERPL-MCNC: 0.38 MG/DL (ref 0.5–1.4)
EOSINOPHIL # BLD AUTO: 0.04 K/UL (ref 0–0.51)
EOSINOPHIL NFR BLD: 0.6 % (ref 0–6.9)
EPI CELLS #/AREA URNS HPF: ABNORMAL /HPF
ERYTHROCYTE [DISTWIDTH] IN BLOOD BY AUTOMATED COUNT: 42.8 FL (ref 35.9–50)
GLUCOSE SERPL-MCNC: 93 MG/DL (ref 65–99)
GLUCOSE UR STRIP.AUTO-MCNC: NEGATIVE MG/DL
HCT VFR BLD AUTO: 41 % (ref 37–47)
HGB BLD-MCNC: 14.4 G/DL (ref 12–16)
HYALINE CASTS #/AREA URNS LPF: ABNORMAL /LPF
IMM GRANULOCYTES # BLD AUTO: 0.01 K/UL (ref 0–0.11)
IMM GRANULOCYTES NFR BLD AUTO: 0.1 % (ref 0–0.9)
KETONES UR STRIP.AUTO-MCNC: NEGATIVE MG/DL
LEUKOCYTE ESTERASE UR QL STRIP.AUTO: ABNORMAL
LYMPHOCYTES # BLD AUTO: 1.64 K/UL (ref 1–4.8)
LYMPHOCYTES NFR BLD: 22.9 % (ref 22–41)
MCH RBC QN AUTO: 32.2 PG (ref 27–33)
MCHC RBC AUTO-ENTMCNC: 35.1 G/DL (ref 33.6–35)
MCV RBC AUTO: 91.7 FL (ref 81.4–97.8)
MICRO URNS: ABNORMAL
MONOCYTES # BLD AUTO: 0.79 K/UL (ref 0–0.85)
MONOCYTES NFR BLD AUTO: 11 % (ref 0–13.4)
NEUTROPHILS # BLD AUTO: 4.66 K/UL (ref 2–7.15)
NEUTROPHILS NFR BLD: 65.1 % (ref 44–72)
NITRITE UR QL STRIP.AUTO: NEGATIVE
NRBC # BLD AUTO: 0 K/UL
NRBC BLD-RTO: 0 /100 WBC
NUMBER OF RH DOSES IND 8505RD: NORMAL
PH UR STRIP.AUTO: 5.5 [PH] (ref 5–8)
PLATELET # BLD AUTO: 173 K/UL (ref 164–446)
PMV BLD AUTO: 10.6 FL (ref 9–12.9)
POTASSIUM SERPL-SCNC: 3.7 MMOL/L (ref 3.6–5.5)
PROT UR QL STRIP: NEGATIVE MG/DL
RBC # BLD AUTO: 4.47 M/UL (ref 4.2–5.4)
RBC # URNS HPF: ABNORMAL /HPF
RBC UR QL AUTO: ABNORMAL
RH BLD: NORMAL
SODIUM SERPL-SCNC: 136 MMOL/L (ref 135–145)
SP GR UR STRIP.AUTO: 1.02
UROBILINOGEN UR STRIP.AUTO-MCNC: 0.2 MG/DL
WBC # BLD AUTO: 7.2 K/UL (ref 4.8–10.8)
WBC #/AREA URNS HPF: ABNORMAL /HPF

## 2020-05-09 PROCEDURE — 81001 URINALYSIS AUTO W/SCOPE: CPT

## 2020-05-09 PROCEDURE — 36415 COLL VENOUS BLD VENIPUNCTURE: CPT

## 2020-05-09 PROCEDURE — 84702 CHORIONIC GONADOTROPIN TEST: CPT

## 2020-05-09 PROCEDURE — 700102 HCHG RX REV CODE 250 W/ 637 OVERRIDE(OP): Performed by: EMERGENCY MEDICINE

## 2020-05-09 PROCEDURE — 80048 BASIC METABOLIC PNL TOTAL CA: CPT

## 2020-05-09 PROCEDURE — 85025 COMPLETE CBC W/AUTO DIFF WBC: CPT

## 2020-05-09 PROCEDURE — 76801 OB US < 14 WKS SINGLE FETUS: CPT

## 2020-05-09 PROCEDURE — 99284 EMERGENCY DEPT VISIT MOD MDM: CPT

## 2020-05-09 PROCEDURE — A9270 NON-COVERED ITEM OR SERVICE: HCPCS | Performed by: EMERGENCY MEDICINE

## 2020-05-09 PROCEDURE — 86901 BLOOD TYPING SEROLOGIC RH(D): CPT

## 2020-05-09 PROCEDURE — 87086 URINE CULTURE/COLONY COUNT: CPT

## 2020-05-09 RX ORDER — ACETAMINOPHEN 500 MG
1000 TABLET ORAL ONCE
Status: COMPLETED | OUTPATIENT
Start: 2020-05-09 | End: 2020-05-09

## 2020-05-09 RX ORDER — NITROFURANTOIN 25; 75 MG/1; MG/1
100 CAPSULE ORAL 2 TIMES DAILY
Qty: 10 CAP | Refills: 0 | Status: SHIPPED | OUTPATIENT
Start: 2020-05-09 | End: 2020-05-14

## 2020-05-09 RX ORDER — ZIPRASIDONE HYDROCHLORIDE 20 MG/1
80 CAPSULE ORAL DAILY
Status: SHIPPED | COMMUNITY
End: 2021-10-04

## 2020-05-09 RX ADMIN — ACETAMINOPHEN 1000 MG: 500 TABLET ORAL at 11:02

## 2020-05-09 ASSESSMENT — FIBROSIS 4 INDEX: FIB4 SCORE: 0.76

## 2020-05-09 NOTE — ED PROVIDER NOTES
"ED Provider Note    CHIEF COMPLAINT  Chief Complaint   Patient presents with   • Abdominal Pain       HPI  Kristen Luna is a 35 y.o. female who presents to the emergency department with lower abdominal pain.  Patient states sudden onset last night, low abdominal \"pressure\", constant.  Patient states she is 9 weeks pregnant, previous ultrasound confirming intrauterine pregnancy with GYN Dr. Ye last week.  LMP 3/3/2020.  This is her first pregnancy, no prior miscarriages or abortions.  Additionally, denies any vaginal bleeding or abnormal vaginal discharge.  No dysuria, hematuria or frequency.  No flank pain.  No fever chills.  No nausea, vomiting or diarrhea.    Denies similar sick contacts.    REVIEW OF SYSTEMS  See HPI for further details. All other systems are negative.     PAST MEDICAL HISTORY   has a past medical history of Arrhythmia, Bipolar affective disorder (HCC), Breath shortness, Gastroparesis, Heart burn, Hypothyroid, Pain, and PTSD (post-traumatic stress disorder).    SOCIAL HISTORY  Social History     Tobacco Use   • Smoking status: Former Smoker     Packs/day: 0.50     Years: 1.50     Pack years: 0.75     Last attempt to quit: 8/26/2009     Years since quitting: 10.7   • Smokeless tobacco: Never Used   • Tobacco comment: one pack every 3months   Substance and Sexual Activity   • Alcohol use: No   • Drug use: No   • Sexual activity: Not on file       SURGICAL HISTORY   has a past surgical history that includes knee arthroscopy (10/2009); dental extraction(s) (2007); knee arthroscopy (1/4/2010); synovectomy (1/4/2010); and medial meniscectomy (1/4/2010).    CURRENT MEDICATIONS  Home Medications     Reviewed by Albania Castillo R.N. (Registered Nurse) on 05/09/20 at 0917  Med List Status: Partial   Medication Last Dose Status   Gabapentin (NEURONTIN PO) 5/8/2020 Active   levothyroxine (SYNTHROID) 75 MCG Tab 5/8/2020 Active   OLANZapine (ZYPREXA) 5 MG Tab  Active   prazosin (MINIPRESS) 1 MG " Cap  Active   Prenatal Multivit-Min-Fe-FA (PRE-ANKUR PO)  Active   traZODone (DESYREL) 50 MG Tab  Active   ziprasidone (GEODON) 20 MG Cap  Active                ALLERGIES  Allergies   Allergen Reactions   • Codeine    • Hydrocodone Itching   • Naprosyn [Naproxen] Hives   • Reglan  [Metoclopramide]    • Zoloft      Left hand swelling   • Fentanyl Photosensitivity     Reports blurred vision        PHYSICAL EXAM  VITAL SIGNS: /69   Pulse 78   Temp 36.4 °C (97.5 °F) (Temporal)   Resp 15   Wt 67.9 kg (149 lb 11.1 oz)   LMP 2020   SpO2 99%   BMI 26.52 kg/m²   Pulse ox interpretation: I interpret this pulse ox as normal.  Constitutional: Alert in no apparent distress.  HENT: Normocephalic, atraumatic. Bilateral external ears normal, Nose normal. Moist mucous membranes.    Eyes: Pupils are equal and reactive, Conjunctiva normal.   Neck: Normal range of motion, Supple  Lymphatic: No lymphadenopathy noted.   Cardiovascular: Regular rate and rhythm, no murmurs. Distal pulses intact.   Thorax & Lungs: Normal breath sounds.  No wheezing/rales/ronchi. No increased work of breathing  Abdomen: Soft, non-distended.  Tender to palpation suprapubic region without rebound, guarding or peritonitis.  Skin: Warm, Dry, No erythema, No rash.   Musculoskeletal: Good range of motion in all major joints.   Neurologic: Alert and oriented x4.  Ambulates independently.  Psychiatric: Affect normal, Judgment normal, Mood normal.       DIAGNOSTIC STUDIES / PROCEDURES    LABS  Results for orders placed or performed during the hospital encounter of 20   CBC WITH DIFFERENTIAL   Result Value Ref Range    WBC 7.2 4.8 - 10.8 K/uL    RBC 4.47 4.20 - 5.40 M/uL    Hemoglobin 14.4 12.0 - 16.0 g/dL    Hematocrit 41.0 37.0 - 47.0 %    MCV 91.7 81.4 - 97.8 fL    MCH 32.2 27.0 - 33.0 pg    MCHC 35.1 (H) 33.6 - 35.0 g/dL    RDW 42.8 35.9 - 50.0 fL    Platelet Count 173 164 - 446 K/uL    MPV 10.6 9.0 - 12.9 fL    Neutrophils-Polys 65.10  44.00 - 72.00 %    Lymphocytes 22.90 22.00 - 41.00 %    Monocytes 11.00 0.00 - 13.40 %    Eosinophils 0.60 0.00 - 6.90 %    Basophils 0.30 0.00 - 1.80 %    Immature Granulocytes 0.10 0.00 - 0.90 %    Nucleated RBC 0.00 /100 WBC    Neutrophils (Absolute) 4.66 2.00 - 7.15 K/uL    Lymphs (Absolute) 1.64 1.00 - 4.80 K/uL    Monos (Absolute) 0.79 0.00 - 0.85 K/uL    Eos (Absolute) 0.04 0.00 - 0.51 K/uL    Baso (Absolute) 0.02 0.00 - 0.12 K/uL    Immature Granulocytes (abs) 0.01 0.00 - 0.11 K/uL    NRBC (Absolute) 0.00 K/uL   BASIC METABOLIC PANEL   Result Value Ref Range    Sodium 136 135 - 145 mmol/L    Potassium 3.7 3.6 - 5.5 mmol/L    Chloride 104 96 - 112 mmol/L    Co2 20 20 - 33 mmol/L    Glucose 93 65 - 99 mg/dL    Bun 7 (L) 8 - 22 mg/dL    Creatinine 0.38 (L) 0.50 - 1.40 mg/dL    Calcium 9.5 8.5 - 10.5 mg/dL    Anion Gap 12.0 7.0 - 16.0   URINALYSIS CULTURE, IF INDICATED   Result Value Ref Range    Color Yellow     Character Clear     Specific Gravity 1.018 <1.035    Ph 5.5 5.0 - 8.0    Glucose Negative Negative mg/dL    Ketones Negative Negative mg/dL    Protein Negative Negative mg/dL    Bilirubin Negative Negative    Urobilinogen, Urine 0.2 Negative    Nitrite Negative Negative    Leukocyte Esterase Small (A) Negative    Occult Blood Trace (A) Negative    Micro Urine Req Microscopic    HCG QUANTITATIVE SERUM   Result Value Ref Range    Bhcg 517345.0 (H) 0.0 - 5.0 mIU/mL   RH TYPE FOR RHOGAM FROM E.D.   Result Value Ref Range    Emergency Department Rh Typing POS     Number Of Rh Doses Indicated ZERO    URINE MICROSCOPIC (W/UA)   Result Value Ref Range    WBC 2-5 /hpf    RBC 2-5 (A) /hpf    Bacteria Negative None /hpf    Epithelial Cells Few /hpf    Hyaline Cast 6-10 (A) /lpf   ESTIMATED GFR   Result Value Ref Range    GFR If African American >60 >60 mL/min/1.73 m 2    GFR If Non African American >60 >60 mL/min/1.73 m 2     RADIOLOGY  US-OB 1ST TRIMESTER WITH TRANSVAGINAL (COMBO)   Final Result      Viable  single intrauterine gestation of an estimated 9 weeks 1 day.          COURSE & MEDICAL DECISION MAKING  Nursing notes and vital signs were reviewed. (See chart for details)  The patients records were reviewed, history was obtained from the patient;     ED evaluation for lower abdominal pain and pregnancy is unrevealing.  Ultrasound demonstrates viable intrauterine gestation of appropriate stated age.  No additional cysts or hemorrhage noted.  Labs are unremarkable.  Urinalysis with small leukocyte Estrace, 2-5 WBCs but negative for bacteria.  Cannot exclude early infection or UTI, although no clinical evidence for pyelonephritis.  Patient will be treated empirically for low abdominal discomfort in the setting with Macrobid for 5 days.  Rh+, no vaginal bleeding, no indication for RhoGam.  Otherwise hemodynamically stable without fever, tachycardia or hypotension.  Symptomatology is atypical for ureteral colic, seems less likely at this time.  No right lower quadrant discomfort, no McBurney point tenderness, doubt appendicitis.    Patient is stable for discharge at this time, anticipatory guidance provided, Macrobid for 5 days, close follow-up is encouraged with gynecology this week, and strict ED return instructions have been detailed. Patient is agreeable to the disposition and plan.    Patient's blood pressure was elevated in the emergency department, and has been referred to primary care for close monitoring.    FINAL IMPRESSION  (O26.891,  R10.9) Abdominal pain during pregnancy in first trimester  (R82.71) Bacteriuria      Electronically signed by: Maki Douglas D.O., 5/9/2020 10:03 AM      This dictation was created using voice recognition software. The accuracy of the dictation is limited to the abilities of the software. I expect there may be some errors of grammar and possibly content. The nursing notes were reviewed and certain aspects of this information were incorporated into this note.

## 2020-05-09 NOTE — ED NOTES
The patient has been provided with discharge education and information.  The patient was also provided with instructions on follow up care and return precautions.  The patient verbalizes understanding of discharge instructions, follow up care, and return precautions.  All questions have been answered.

## 2020-05-09 NOTE — DISCHARGE INSTRUCTIONS
Follow-up with gynecology early next week for reevaluation.    Macrobid twice a daily for 5 days for possible urinary tract infection.    Encourage oral fluid hydration well-balanced diet.    Limited activity and strict pelvic rest (no intercourse or other intravaginal objects) until seen by gynecology.    Return to the emergency department for persistent worsening pain, fever, vomiting, vaginal bleeding (more than 1 pad per hour for 4 hours) or other new concerns

## 2020-05-09 NOTE — ED TRIAGE NOTES
Pt ambulatory to triage c/o lower generalized abdominal pain constant in nature denies vaginal bleeding. Pt states she is 9 weeks pregnant. nad

## 2020-05-11 LAB
BACTERIA UR CULT: NORMAL
SIGNIFICANT IND 70042: NORMAL
SITE SITE: NORMAL
SOURCE SOURCE: NORMAL

## 2020-05-13 ENCOUNTER — HOSPITAL ENCOUNTER (EMERGENCY)
Facility: MEDICAL CENTER | Age: 35
End: 2020-05-13
Attending: EMERGENCY MEDICINE
Payer: MEDICAID

## 2020-05-13 VITALS
TEMPERATURE: 97.1 F | BODY MASS INDEX: 26.48 KG/M2 | RESPIRATION RATE: 18 BRPM | HEIGHT: 63 IN | OXYGEN SATURATION: 100 % | WEIGHT: 149.47 LBS | SYSTOLIC BLOOD PRESSURE: 145 MMHG | DIASTOLIC BLOOD PRESSURE: 89 MMHG | HEART RATE: 89 BPM

## 2020-05-13 DIAGNOSIS — R11.2 NON-INTRACTABLE VOMITING WITH NAUSEA, UNSPECIFIED VOMITING TYPE: ICD-10-CM

## 2020-05-13 LAB
ALBUMIN SERPL BCP-MCNC: 4.4 G/DL (ref 3.2–4.9)
ALBUMIN/GLOB SERPL: 1.5 G/DL
ALP SERPL-CCNC: 55 U/L (ref 30–99)
ALT SERPL-CCNC: 9 U/L (ref 2–50)
ANION GAP SERPL CALC-SCNC: 14 MMOL/L (ref 7–16)
APPEARANCE UR: CLEAR
AST SERPL-CCNC: 12 U/L (ref 12–45)
BACTERIA #/AREA URNS HPF: NEGATIVE /HPF
BASOPHILS # BLD AUTO: 0.1 % (ref 0–1.8)
BASOPHILS # BLD: 0.01 K/UL (ref 0–0.12)
BILIRUB SERPL-MCNC: 0.8 MG/DL (ref 0.1–1.5)
BILIRUB UR QL STRIP.AUTO: NEGATIVE
BUN SERPL-MCNC: 10 MG/DL (ref 8–22)
CALCIUM SERPL-MCNC: 9.8 MG/DL (ref 8.5–10.5)
CHLORIDE SERPL-SCNC: 100 MMOL/L (ref 96–112)
CO2 SERPL-SCNC: 20 MMOL/L (ref 20–33)
COLOR UR: YELLOW
CREAT SERPL-MCNC: 0.35 MG/DL (ref 0.5–1.4)
EOSINOPHIL # BLD AUTO: 0.02 K/UL (ref 0–0.51)
EOSINOPHIL NFR BLD: 0.3 % (ref 0–6.9)
EPI CELLS #/AREA URNS HPF: ABNORMAL /HPF
ERYTHROCYTE [DISTWIDTH] IN BLOOD BY AUTOMATED COUNT: 42.5 FL (ref 35.9–50)
GLOBULIN SER CALC-MCNC: 3 G/DL (ref 1.9–3.5)
GLUCOSE SERPL-MCNC: 90 MG/DL (ref 65–99)
GLUCOSE UR STRIP.AUTO-MCNC: NEGATIVE MG/DL
HCT VFR BLD AUTO: 43.5 % (ref 37–47)
HGB BLD-MCNC: 15.3 G/DL (ref 12–16)
HYALINE CASTS #/AREA URNS LPF: ABNORMAL /LPF
IMM GRANULOCYTES # BLD AUTO: 0.02 K/UL (ref 0–0.11)
IMM GRANULOCYTES NFR BLD AUTO: 0.3 % (ref 0–0.9)
KETONES UR STRIP.AUTO-MCNC: 80 MG/DL
LEUKOCYTE ESTERASE UR QL STRIP.AUTO: ABNORMAL
LIPASE SERPL-CCNC: 21 U/L (ref 11–82)
LYMPHOCYTES # BLD AUTO: 1.43 K/UL (ref 1–4.8)
LYMPHOCYTES NFR BLD: 18 % (ref 22–41)
MCH RBC QN AUTO: 32 PG (ref 27–33)
MCHC RBC AUTO-ENTMCNC: 35.2 G/DL (ref 33.6–35)
MCV RBC AUTO: 91 FL (ref 81.4–97.8)
MICRO URNS: ABNORMAL
MONOCYTES # BLD AUTO: 0.64 K/UL (ref 0–0.85)
MONOCYTES NFR BLD AUTO: 8.1 % (ref 0–13.4)
MUCOUS THREADS #/AREA URNS HPF: ABNORMAL /HPF
NEUTROPHILS # BLD AUTO: 5.83 K/UL (ref 2–7.15)
NEUTROPHILS NFR BLD: 73.2 % (ref 44–72)
NITRITE UR QL STRIP.AUTO: NEGATIVE
NRBC # BLD AUTO: 0 K/UL
NRBC BLD-RTO: 0 /100 WBC
PH UR STRIP.AUTO: 6 [PH] (ref 5–8)
PLATELET # BLD AUTO: 186 K/UL (ref 164–446)
PMV BLD AUTO: 10.6 FL (ref 9–12.9)
POTASSIUM SERPL-SCNC: 3.4 MMOL/L (ref 3.6–5.5)
PROT SERPL-MCNC: 7.4 G/DL (ref 6–8.2)
PROT UR QL STRIP: NEGATIVE MG/DL
RBC # BLD AUTO: 4.78 M/UL (ref 4.2–5.4)
RBC # URNS HPF: ABNORMAL /HPF
RBC UR QL AUTO: NEGATIVE
SODIUM SERPL-SCNC: 134 MMOL/L (ref 135–145)
SP GR UR STRIP.AUTO: 1.01
UROBILINOGEN UR STRIP.AUTO-MCNC: 0.2 MG/DL
WBC # BLD AUTO: 8 K/UL (ref 4.8–10.8)
WBC #/AREA URNS HPF: ABNORMAL /HPF

## 2020-05-13 PROCEDURE — 80053 COMPREHEN METABOLIC PANEL: CPT

## 2020-05-13 PROCEDURE — 83690 ASSAY OF LIPASE: CPT

## 2020-05-13 PROCEDURE — 96376 TX/PRO/DX INJ SAME DRUG ADON: CPT

## 2020-05-13 PROCEDURE — 96374 THER/PROPH/DIAG INJ IV PUSH: CPT

## 2020-05-13 PROCEDURE — 700111 HCHG RX REV CODE 636 W/ 250 OVERRIDE (IP): Performed by: EMERGENCY MEDICINE

## 2020-05-13 PROCEDURE — 85025 COMPLETE CBC W/AUTO DIFF WBC: CPT

## 2020-05-13 PROCEDURE — 99285 EMERGENCY DEPT VISIT HI MDM: CPT

## 2020-05-13 PROCEDURE — 81001 URINALYSIS AUTO W/SCOPE: CPT

## 2020-05-13 PROCEDURE — 700105 HCHG RX REV CODE 258: Performed by: EMERGENCY MEDICINE

## 2020-05-13 RX ORDER — DEXTROSE AND SODIUM CHLORIDE 5; .45 G/100ML; G/100ML
INJECTION, SOLUTION INTRAVENOUS CONTINUOUS
Status: DISCONTINUED | OUTPATIENT
Start: 2020-05-13 | End: 2020-05-13 | Stop reason: HOSPADM

## 2020-05-13 RX ORDER — ONDANSETRON 4 MG/1
4 TABLET, ORALLY DISINTEGRATING ORAL EVERY 8 HOURS PRN
Qty: 20 TAB | Refills: 0 | Status: SHIPPED | OUTPATIENT
Start: 2020-05-13 | End: 2022-04-25

## 2020-05-13 RX ORDER — ONDANSETRON 2 MG/ML
4 INJECTION INTRAMUSCULAR; INTRAVENOUS ONCE
Status: COMPLETED | OUTPATIENT
Start: 2020-05-13 | End: 2020-05-13

## 2020-05-13 RX ORDER — SODIUM CHLORIDE 9 MG/ML
1000 INJECTION, SOLUTION INTRAVENOUS ONCE
Status: COMPLETED | OUTPATIENT
Start: 2020-05-13 | End: 2020-05-13

## 2020-05-13 RX ORDER — DOXYLAMINE SUCCINATE AND PYRIDOXINE HYDROCHLORIDE, DELAYED RELEASE TABLETS 10 MG/10 MG 10; 10 MG/1; MG/1
2 TABLET, DELAYED RELEASE ORAL
Qty: 60 TAB | Refills: 0 | Status: ON HOLD | OUTPATIENT
Start: 2020-05-13 | End: 2020-10-02

## 2020-05-13 RX ADMIN — SODIUM CHLORIDE 1000 ML: 9 INJECTION, SOLUTION INTRAVENOUS at 14:15

## 2020-05-13 RX ADMIN — ONDANSETRON 4 MG: 2 INJECTION INTRAMUSCULAR; INTRAVENOUS at 12:29

## 2020-05-13 RX ADMIN — DEXTROSE AND SODIUM CHLORIDE: 5; 450 INJECTION, SOLUTION INTRAVENOUS at 12:36

## 2020-05-13 RX ADMIN — ONDANSETRON 4 MG: 2 INJECTION INTRAMUSCULAR; INTRAVENOUS at 14:20

## 2020-05-13 ASSESSMENT — FIBROSIS 4 INDEX: FIB4 SCORE: 0.81

## 2020-05-13 NOTE — ED NOTES
Discharge instructions reviewed with pt. All questions answered. Pt verbalized understanding and is okay with discharge plan. Pt ambulatory to exit

## 2020-05-13 NOTE — ED PROVIDER NOTES
ED Provider Note    Scribed for Loki Das M.D. by Kimberley Gallegos. 2020  12:17 PM    Primary care provider: Colette Tinsley M.D.  Means of arrival: Walk-In  History obtained from: Patient  History limited by: None    CHIEF COMPLAINT  Chief Complaint   Patient presents with   • Dehydration     Sent to ER from OB/GYN office for IV fluids.  Pt is 10 weeks pregnant.  Here to ER on Sat and dx with UTI.       HPI  Kristen Luna is a 35 y.o. 10 week pregnant female, with a history of Sandra-Danlos Syndrome and gastroparesis, who presents to the Emergency Department for dehydration. Patient states she was sent here by Dr. Ye (OBGYN) for IV fluids. Chart review indicates that she was seen here 4 days ago for acute onset lower abdominal pain. She was started on Macrobid for 5 days and was advised to follow up with gynecology within the week. She notes during her follow up appointment today, a urine sample was taken and indicated patient was significantly dehydrated, which is why she was advised to report to the ED. She reports she has been vomiting throughout the entirety of her pregnancy and has not been able to keep fluids down. She typically has 2-3 episodes of emesis a day. She is additionally complaining of intermittent abdominal pain. She is A0. No alleviating or exacerbating factors noted at this time. No additional pain or symptoms noted at this time.     REVIEW OF SYSTEMS  Pertinent positives include dehydration, abdominal pain, and vomiting. Pertinent negatives include: No additional pain or symptoms noted at this time.  All other systems reviewed and negative.    PAST MEDICAL HISTORY   has a past medical history of Arrhythmia, Bipolar affective disorder (HCC), Breath shortness, Gastroparesis, Heart burn, Hypothyroid, Pain, and PTSD (post-traumatic stress disorder).    SURGICAL HISTORY   has a past surgical history that includes knee arthroscopy (10/2009); dental extraction(s)  "(); knee arthroscopy (2010); synovectomy (2010); and medial meniscectomy (2010).    SOCIAL HISTORY  Social History     Tobacco Use   • Smoking status: Former Smoker     Packs/day: 0.50     Years: 1.50     Pack years: 0.75     Last attempt to quit: 2009     Years since quitting: 10.7   • Smokeless tobacco: Never Used   • Tobacco comment: one pack every 3months   Substance Use Topics   • Alcohol use: No   • Drug use: No      Social History     Substance and Sexual Activity   Drug Use No       FAMILY HISTORY  Family History   Problem Relation Age of Onset   • Diabetes Other    • Heart Disease Mother        CURRENT MEDICATIONS  Home Medications     Reviewed by Niyah Garcia R.N. (Registered Nurse) on 20 at 1208  Med List Status: <None>   Medication Last Dose Status   Gabapentin (NEURONTIN PO)  Active   levothyroxine (SYNTHROID) 75 MCG Tab  Active   nitrofurantoin (MACROBID) 100 MG Cap  Active   OLANZapine (ZYPREXA) 5 MG Tab  Active   prazosin (MINIPRESS) 1 MG Cap  Active   Prenatal Multivit-Min-Fe-FA (PRE-ANKUR PO)  Active   traZODone (DESYREL) 50 MG Tab  Active   ziprasidone (GEODON) 20 MG Cap  Active                ALLERGIES  Allergies   Allergen Reactions   • Codeine    • Hydrocodone Itching   • Naprosyn [Naproxen] Hives   • Reglan  [Metoclopramide]    • Zoloft      Left hand swelling   • Fentanyl Photosensitivity     Reports blurred vision        PHYSICAL EXAM  VITAL SIGNS: /86   Pulse 77   Temp 36.1 °C (97 °F) (Temporal)   Resp 16   Ht 1.6 m (5' 3\")   Wt 67.8 kg (149 lb 7.6 oz)   LMP 2020   SpO2 97%   BMI 26.48 kg/m²     Constitutional: Well developed, Well nourished, Mild distress, Non-toxic appearance.   HENT: Normocephalic, Atraumatic, Bilateral external ears normal, Oropharynx dry, No oral exudates.   Eyes: PERRLA, EOMI, Conjunctiva normal, No discharge.   Neck: No tenderness, Supple, No stridor.   Lymphatic: No lymphadenopathy noted.   Cardiovascular: Normal " heart rate, Normal rhythm.   Thorax & Lungs: Clear to auscultation bilaterally, No respiratory distress, No wheezing, No crackles.   Abdomen: Soft, No tenderness, No masses, No pulsatile masses.   Skin: Warm, Dry, No erythema, No rash.   Extremities:, No edema No cyanosis.   Musculoskeletal: No tenderness to palpation or major deformities noted.  Intact distal pulses  Neurologic: Awake, alert. Moves all extremities spontaneously.  Psychiatric: Affect normal, Judgment normal, Mood normal.     I verified that the patient was wearing a mask and I was wearing appropriate PPE every time I entered the room. The patient's mask was on the patient at all times during my encounter except for a brief view of the oropharynx.     LABS  Results for orders placed or performed during the hospital encounter of 05/13/20   CBC WITH DIFFERENTIAL   Result Value Ref Range    WBC 8.0 4.8 - 10.8 K/uL    RBC 4.78 4.20 - 5.40 M/uL    Hemoglobin 15.3 12.0 - 16.0 g/dL    Hematocrit 43.5 37.0 - 47.0 %    MCV 91.0 81.4 - 97.8 fL    MCH 32.0 27.0 - 33.0 pg    MCHC 35.2 (H) 33.6 - 35.0 g/dL    RDW 42.5 35.9 - 50.0 fL    Platelet Count 186 164 - 446 K/uL    MPV 10.6 9.0 - 12.9 fL    Neutrophils-Polys 73.20 (H) 44.00 - 72.00 %    Lymphocytes 18.00 (L) 22.00 - 41.00 %    Monocytes 8.10 0.00 - 13.40 %    Eosinophils 0.30 0.00 - 6.90 %    Basophils 0.10 0.00 - 1.80 %    Immature Granulocytes 0.30 0.00 - 0.90 %    Nucleated RBC 0.00 /100 WBC    Neutrophils (Absolute) 5.83 2.00 - 7.15 K/uL    Lymphs (Absolute) 1.43 1.00 - 4.80 K/uL    Monos (Absolute) 0.64 0.00 - 0.85 K/uL    Eos (Absolute) 0.02 0.00 - 0.51 K/uL    Baso (Absolute) 0.01 0.00 - 0.12 K/uL    Immature Granulocytes (abs) 0.02 0.00 - 0.11 K/uL    NRBC (Absolute) 0.00 K/uL   COMP METABOLIC PANEL   Result Value Ref Range    Sodium 134 (L) 135 - 145 mmol/L    Potassium 3.4 (L) 3.6 - 5.5 mmol/L    Chloride 100 96 - 112 mmol/L    Co2 20 20 - 33 mmol/L    Anion Gap 14.0 7.0 - 16.0    Glucose 90 65 -  99 mg/dL    Bun 10 8 - 22 mg/dL    Creatinine 0.35 (L) 0.50 - 1.40 mg/dL    Calcium 9.8 8.5 - 10.5 mg/dL    AST(SGOT) 12 12 - 45 U/L    ALT(SGPT) 9 2 - 50 U/L    Alkaline Phosphatase 55 30 - 99 U/L    Total Bilirubin 0.8 0.1 - 1.5 mg/dL    Albumin 4.4 3.2 - 4.9 g/dL    Total Protein 7.4 6.0 - 8.2 g/dL    Globulin 3.0 1.9 - 3.5 g/dL    A-G Ratio 1.5 g/dL   LIPASE   Result Value Ref Range    Lipase 21 11 - 82 U/L   ESTIMATED GFR   Result Value Ref Range    GFR If African American >60 >60 mL/min/1.73 m 2    GFR If Non African American >60 >60 mL/min/1.73 m 2        RADIOLOGY  No orders to display     The radiologist's interpretation of all radiological studies have been reviewed by me.      COURSE & MEDICAL DECISION MAKING  Pertinent Labs & Imaging studies reviewed. (See chart for details)    I reviewed the patient's medical records which showed she was seen here 4 days ago for acute onset lower abdominal pain. Ultrasound confirmed an IUP and her urine culture was negative. She was started on Macrobid for 5 days and was advised to follow up with gynecology within the week.      12:17 PM - Patient seen and examined at bedside. I informed the patient the need for labs and radiology to rule out any emergent processes. Currently awaiting results before deciding if intervention is necessary. Patient verbalizes understanding and agreement to this plan of care. Patient will be treated with D5 1/2 NS infusion and Zofran injection 4 mg. Ordered UA culture if indicated, CBC with diff, CMP, and lipase to evaluate her symptoms. The differential diagnoses include but are not limited to: dehydration, electrolyte abnormality,     Decision Making:  Patient is coming in with nausea vomiting.  Was seen by OB/GYN, had a large amount of ketones in her urine therefore was sent in for IV hydration.  Give the patient half a liter of D5NS, give the patient a liter of NS, Zofran x2 with improvement of patient's symptoms.  Patient still  slightly nauseous but due to COVID-19 I believe it is best to try an outpatient trial with Zofran, Diclegis, discussed the case with Dr. Murdock who agrees.  Patient will be discharged home, have the patient follow-up with Dr. Murdock, have the patient return with any other concerns.    HYDRATION: Based on the patient's presentation of Dehydration the patient was given IV fluids. IV Hydration was used because oral hydration was not adequate alone. Upon recheck following hydration, the patient was improved.     The patient will return for new or worsening symptoms and is stable at the time of discharge.    The patient is referred to a primary physician for blood pressure management, diabetic screening, and for all other preventative health concerns.        DISPOSITION:  Patient will be discharged home in stable condition.    FOLLOW UP:  [facility], Emergency Dept  [address] [facility] [address]  [phone]        Malinda Murdock M.D.  [address] [place] [id number]  [phone]            OUTPATIENT MEDICATIONS:  New Prescriptions    DOXYLAMINE-PYRIDOXINE 10-10 MG TABLET DELAYED RESPONSE DELAYED-RELEASE TABLET    Take 2 Doses by mouth every bedtime.    ONDANSETRON (ZOFRAN ODT) 4 MG TABLET DISPERSIBLE    Take 1 Tab by mouth every 8 hours as needed.         FINAL IMPRESSION  1. Non-intractable vomiting with nausea, unspecified vomiting type          I, Kimberley Gallegos (Kayley), am scribing for, and in the presence of, Loki Das M.D..    Electronically signed by: Kimberley May), 5/13/2020    ILoki M.D. personally performed the services described in this documentation, as scribed by Kimberley Gallegos in my presence, and it is both accurate and complete. C    The note accurately reflects work and decisions made by me.  Loki Das M.D.  5/13/2020  2:45 PM

## 2020-05-13 NOTE — ED NOTES
Pt arrives from ob/gyn office. She was sent here because her urine show dehydration. Pt states was here on Saturday and diagnosed with uti. States she still had epigastric pain with n/v/d. Decreased oral intake. Reports being 10 weeks pregnant.

## 2020-05-13 NOTE — ED TRIAGE NOTES
Chief Complaint   Patient presents with   • Nausea/Vomiting/Diarrhea     Sent to ER from OB/GYN office for IV fluids.  Pt is 10 weeks pregnant.  Here to ER on Sat and dx with UTI.

## 2020-06-30 ENCOUNTER — HOSPITAL ENCOUNTER (EMERGENCY)
Facility: MEDICAL CENTER | Age: 35
End: 2020-06-30
Attending: EMERGENCY MEDICINE
Payer: MEDICAID

## 2020-06-30 VITALS
HEART RATE: 74 BPM | HEIGHT: 63 IN | TEMPERATURE: 97.6 F | RESPIRATION RATE: 14 BRPM | DIASTOLIC BLOOD PRESSURE: 71 MMHG | SYSTOLIC BLOOD PRESSURE: 110 MMHG | BODY MASS INDEX: 26.02 KG/M2 | WEIGHT: 146.83 LBS | OXYGEN SATURATION: 99 %

## 2020-06-30 DIAGNOSIS — B96.89 BACTERIAL VAGINOSIS: ICD-10-CM

## 2020-06-30 DIAGNOSIS — N76.0 BACTERIAL VAGINOSIS: ICD-10-CM

## 2020-06-30 LAB
APPEARANCE UR: ABNORMAL
BACTERIA #/AREA URNS HPF: ABNORMAL /HPF
BILIRUB UR QL STRIP.AUTO: NEGATIVE
CANDIDA DNA VAG QL PROBE+SIG AMP: NEGATIVE
COLOR UR: YELLOW
EPI CELLS #/AREA URNS HPF: ABNORMAL /HPF
G VAGINALIS DNA VAG QL PROBE+SIG AMP: POSITIVE
GLUCOSE UR STRIP.AUTO-MCNC: NEGATIVE MG/DL
KETONES UR STRIP.AUTO-MCNC: ABNORMAL MG/DL
LEUKOCYTE ESTERASE UR QL STRIP.AUTO: ABNORMAL
MICRO URNS: ABNORMAL
MUCOUS THREADS #/AREA URNS HPF: ABNORMAL /HPF
NITRITE UR QL STRIP.AUTO: NEGATIVE
PH UR STRIP.AUTO: 5.5 [PH] (ref 5–8)
PROT UR QL STRIP: NEGATIVE MG/DL
RBC # URNS HPF: ABNORMAL /HPF
RBC UR QL AUTO: ABNORMAL
SP GR UR STRIP.AUTO: 1.01
T VAGINALIS DNA VAG QL PROBE+SIG AMP: NEGATIVE
UROBILINOGEN UR STRIP.AUTO-MCNC: 1 MG/DL
WBC #/AREA URNS HPF: ABNORMAL /HPF

## 2020-06-30 PROCEDURE — A9270 NON-COVERED ITEM OR SERVICE: HCPCS | Performed by: EMERGENCY MEDICINE

## 2020-06-30 PROCEDURE — 99284 EMERGENCY DEPT VISIT MOD MDM: CPT

## 2020-06-30 PROCEDURE — 87491 CHLMYD TRACH DNA AMP PROBE: CPT

## 2020-06-30 PROCEDURE — 700102 HCHG RX REV CODE 250 W/ 637 OVERRIDE(OP): Performed by: EMERGENCY MEDICINE

## 2020-06-30 PROCEDURE — 87510 GARDNER VAG DNA DIR PROBE: CPT

## 2020-06-30 PROCEDURE — 87591 N.GONORRHOEAE DNA AMP PROB: CPT

## 2020-06-30 PROCEDURE — 81001 URINALYSIS AUTO W/SCOPE: CPT

## 2020-06-30 PROCEDURE — 87660 TRICHOMONAS VAGIN DIR PROBE: CPT

## 2020-06-30 PROCEDURE — 87480 CANDIDA DNA DIR PROBE: CPT

## 2020-06-30 RX ORDER — ACETAMINOPHEN 325 MG/1
975 TABLET ORAL ONCE
Status: COMPLETED | OUTPATIENT
Start: 2020-06-30 | End: 2020-06-30

## 2020-06-30 RX ORDER — METRONIDAZOLE 500 MG/1
500 TABLET ORAL 2 TIMES DAILY
Qty: 14 TAB | Refills: 0 | Status: SHIPPED | OUTPATIENT
Start: 2020-06-30 | End: 2020-07-07

## 2020-06-30 RX ADMIN — ACETAMINOPHEN 975 MG: 325 TABLET, FILM COATED ORAL at 15:45

## 2020-06-30 ASSESSMENT — FIBROSIS 4 INDEX: FIB4 SCORE: .7526881720430107527

## 2020-06-30 NOTE — ED NOTES
Patient able to walk to and from restroom with steady gate at this time. Urine sample sent to lab

## 2020-06-30 NOTE — ED PROVIDER NOTES
ED Provider Note    CHIEF COMPLAINT  Chief Complaint   Patient presents with   • Vaginal Discharge     Pt 17 weeks having thick yellow Discharge.        HPI  Kristen Luna is a 35 y.o. female who presents currently 17 weeks pregnant, she describes thick yellow discharge for 2 days.  She has not been sexually active since she became pregnant.  She denies any vaginal bleeding, no abdominal pain.  She denies any urinary symptoms.    REVIEW OF SYSTEMS  See HPI for further details. All other systems are negative.     PAST MEDICAL HISTORY   has a past medical history of Arrhythmia, Bipolar affective disorder (HCC), Breath shortness, Gastroparesis, Heart burn, Hypothyroid, Pain, and PTSD (post-traumatic stress disorder).    SOCIAL HISTORY  Social History     Tobacco Use   • Smoking status: Former Smoker     Packs/day: 0.50     Years: 1.50     Pack years: 0.75     Last attempt to quit: 2009     Years since quitting: 10.8   • Smokeless tobacco: Never Used   • Tobacco comment: one pack every 3months   Substance and Sexual Activity   • Alcohol use: No   • Drug use: No   • Sexual activity: Not on file       SURGICAL HISTORY   has a past surgical history that includes knee arthroscopy (10/2009); dental extraction(s) (); knee arthroscopy (2010); synovectomy (2010); and medial meniscectomy (2010).    CURRENT MEDICATIONS  Home Medications     Reviewed by Radha Campbell R.N. (Registered Nurse) on 20 at 1430  Med List Status: Partial   Medication Last Dose Status   Doxylamine-Pyridoxine 10-10 MG Tablet Delayed Response delayed-release tablet  Active   Gabapentin (NEURONTIN PO)  Active   levothyroxine (SYNTHROID) 75 MCG Tab  Active   OLANZapine (ZYPREXA) 5 MG Tab  Active   ondansetron (ZOFRAN ODT) 4 MG TABLET DISPERSIBLE  Active   prazosin (MINIPRESS) 1 MG Cap  Active   Prenatal Multivit-Min-Fe-FA (PRE- PO)  Active   traZODone (DESYREL) 50 MG Tab  Active   ziprasidone (GEODON) 20 MG Cap  Active     "            ALLERGIES  Allergies   Allergen Reactions   • Codeine    • Hydrocodone Itching   • Naprosyn [Naproxen] Hives   • Reglan  [Metoclopramide]    • Zoloft      Left hand swelling   • Fentanyl Photosensitivity     Reports blurred vision        PHYSICAL EXAM  VITAL SIGNS: /59   Pulse 81   Temp 36.8 °C (98.2 °F) (Temporal)   Resp 18   Ht 1.6 m (5' 3\")   Wt 66.6 kg (146 lb 13.2 oz)   LMP 03/03/2020 (Exact Date)   SpO2 96%   BMI 26.01 kg/m²  @LAISHA[[id number]::@   Pulse ox interpretation: I interpret this pulse ox as normal.  Constitutional: Alert in no apparent distress.  HENT: No signs of trauma, Bilateral external ears normal, Nose normal.   Eyes: Pupils are equal and reactive, Conjunctiva normal, Non-icteric.   Neck: Normal range of motion, No tenderness, Supple, No stridor.   Lymphatic: No lymphadenopathy noted.   Cardiovascular: Regular rate and rhythm, no murmurs.   Thorax & Lungs: Normal breath sounds, No respiratory distress, No wheezing, No chest tenderness.   Abdomen: Bowel sounds normal, No tenderness.  Skin: Warm, Dry, No erythema, No rash.   Back: No bony tenderness, No CVA tenderness.   Extremities: Intact distal pulses, No edema, No tenderness, No cyanosis.  Musculoskeletal: Good range of motion in all major joints. No tenderness to palpation or major deformities noted.   Neurologic: Alert , Normal motor function, Normal sensory function, No focal deficits noted.   Psychiatric: Affect normal, Judgment normal, Mood normal.   Pelvic exam: With female chaperone tech Maryanne, the patient has yellow discharge, closed cervix, no cervical motion tenderness.    DIAGNOSTIC STUDIES / PROCEDURES    LABS  Labs Reviewed   URINALYSIS,CULTURE IF INDICATED - Abnormal; Notable for the following components:       Result Value    Character Cloudy (*)     Ketones Trace (*)     Leukocyte Esterase Small (*)     Occult Blood Moderate (*)     All other components within normal limits   VAGINAL PATHOGENS DNA PANEL " - Abnormal; Notable for the following components:    Gardnerella vaginalis DNA Probe POSITIVE (*)     All other components within normal limits   URINE MICROSCOPIC (W/UA) - Abnormal; Notable for the following components:    Bacteria Few (*)     All other components within normal limits   CHLAMYDIA/GC PCR URINE OR SWAB               COURSE & MEDICAL DECISION MAKING  Pertinent Labs & Imaging studies reviewed. (See chart for details)    Differential diagnosis: Bacterial vaginosis, yeast infection, trichomonas, gonorrhea, chlamydia    Patient's vaginal pathogens DNA panel was positive for Gardnerella vaginalis.  After referring to up-to-date she will be treated with Flagyl 500 mg p.o. twice daily x7 days.  She will follow-up with her OB/GYN, she will return if worse.    The patient will return for new or worsening symptoms and is stable at the time of discharge.    The patient is referred to her primary physician for blood pressure management, diabetic screening, and for all other preventative health concerns.      DISPOSITION:  Patient will be discharged home in stable condition.    FOLLOW UP:  [facility], Emergency Dept  [address] [address]  [phone]    If symptoms worsen    Malinda Murdock M.D.  [address] [address]  [phone]      call for follow up      OUTPATIENT MEDICATIONS:  Discharge Medication List as of 6/30/2020  5:32 PM      START taking these medications    Details   metroNIDAZOLE (FLAGYL) 500 MG Tab Take 1 Tab by mouth 2 Times a Day for 7 days., Disp-14 Tab,R-0, Print Rx Paper                The patient will return for worsening symptoms and is stable at the time of discharge. The patient verbalizes understanding and will comply.    FINAL IMPRESSION  1. Bacterial vaginosis                Electronically signed by: Mahad Greene M.D., 6/30/2020 4:36 PM

## 2020-06-30 NOTE — ED TRIAGE NOTES
Kristen Luna  35 y.o.  Chief Complaint   Patient presents with   • Vaginal Discharge     Pt 17 weeks having thick yellow Discharge.        Patient to triage with above complaint.  Pt denies foul odor just yellowish and thick pt reports started yesterday. Pt + confirmed pregnancy OB visit yesterday.     Vitals:    06/30/20 1409   BP: 119/59   Pulse: 81   Resp: 18   Temp: 36.8 °C (98.2 °F)   SpO2: 96%       Triage process explained to patient, apologized for wait time, and returned to lobby.  Pt informed to notify staff of any change in condition. NAD at this time.

## 2020-06-30 NOTE — ED NOTES
Patient walked with a steady gate at this time to er [facility] area room 72. Placed patient into gown, gave warm blanket, and call light. Ready to be seen  rn at bedside

## 2020-06-30 NOTE — ED NOTES
Patient vital signs rechecked and documented per [facility]. Patient denies any new needs at this time. Patient is up to date on poc    Gave patient blanket.   She continues to wait for x1 lab result, she is aware and up to date on poc

## 2020-07-01 LAB
C TRACH DNA SPEC QL NAA+PROBE: NEGATIVE
N GONORRHOEA DNA SPEC QL NAA+PROBE: NEGATIVE
SPECIMEN SOURCE: NORMAL

## 2020-07-01 NOTE — DISCHARGE INSTRUCTIONS
Bacterial Vaginosis    Bacterial vaginosis is a vaginal infection that occurs when the normal balance of bacteria in the vagina is disrupted. It results from an overgrowth of certain bacteria. This is the most common vaginal infection among women ages 15-44.  Because bacterial vaginosis increases your risk for STIs (sexually transmitted infections), getting treated can help reduce your risk for chlamydia, gonorrhea, herpes, and HIV (human immunodeficiency virus). Treatment is also important for preventing complications in pregnant women, because this condition can cause an early (premature) delivery.  What are the causes?  This condition is caused by an increase in harmful bacteria that are normally present in small amounts in the vagina. However, the reason that the condition develops is not fully understood.  What increases the risk?  The following factors may make you more likely to develop this condition:  · Having a new sexual partner or multiple sexual partners.  · Having unprotected sex.  · Douching.  · Having an intrauterine device (IUD).  · Smoking.  · Drug and alcohol abuse.  · Taking certain antibiotic medicines.  · Being pregnant.  You cannot get bacterial vaginosis from toilet seats, bedding, swimming pools, or contact with objects around you.  What are the signs or symptoms?  Symptoms of this condition include:  · Grey or white vaginal discharge. The discharge can also be watery or foamy.  · A fish-like odor with discharge, especially after sexual intercourse or during menstruation.  · Itching in and around the vagina.  · Burning or pain with urination.  Some women with bacterial vaginosis have no signs or symptoms.  How is this diagnosed?  This condition is diagnosed based on:  · Your medical history.  · A physical exam of the vagina.  · Testing a sample of vaginal fluid under a microscope to look for a large amount of bad bacteria or abnormal cells. Your health care provider may use a cotton swab or  a small wooden spatula to collect the sample.  How is this treated?  This condition is treated with antibiotics. These may be given as a pill, a vaginal cream, or a medicine that is put into the vagina (suppository). If the condition comes back after treatment, a second round of antibiotics may be needed.  Follow these instructions at home:  Medicines  · Take over-the-counter and prescription medicines only as told by your health care provider.  · Take or use your antibiotic as told by your health care provider. Do not stop taking or using the antibiotic even if you start to feel better.  General instructions  · If you have a female sexual partner, tell her that you have a vaginal infection. She should see her health care provider and be treated if she has symptoms. If you have a male sexual partner, he does not need treatment.  · During treatment:  ? Avoid sexual activity until you finish treatment.  ? Do not douche.  ? Avoid alcohol as directed by your health care provider.  ? Avoid breastfeeding as directed by your health care provider.  · Drink enough water and fluids to keep your urine clear or pale yellow.  · Keep the area around your vagina and rectum clean.  ? Wash the area daily with warm water.  ? Wipe yourself from front to back after using the toilet.  · Keep all follow-up visits as told by your health care provider. This is important.  How is this prevented?  · Do not douche.  · Wash the outside of your vagina with warm water only.  · Use protection when having sex. This includes latex condoms and dental dams.  · Limit how many sexual partners you have. To help prevent bacterial vaginosis, it is best to have sex with just one partner (monogamous).  · Make sure you and your sexual partner are tested for STIs.  · Wear cotton or cotton-lined underwear.  · Avoid wearing tight pants and pantyhose, especially during summer.  · Limit the amount of alcohol that you drink.  · Do not use any products that contain  nicotine or tobacco, such as cigarettes and e-cigarettes. If you need help quitting, ask your health care provider.  · Do not use illegal drugs.  Where to find more information  · Centers for Disease Control and Prevention: www.cdc.gov/std  · American Sexual Health Association (JOSEPH): www.ashastd.org  · U.S. Department of Health and Human Services, Office on Women's Health: www.womenshealth.gov/ or https://www.womenshealth.gov/a-z-topics/bacterial-vaginosis  Contact a health care provider if:  · Your symptoms do not improve, even after treatment.  · You have more discharge or pain when urinating.  · You have a fever.  · You have pain in your abdomen.  · You have pain during sex.  · You have vaginal bleeding between periods.  Summary  · Bacterial vaginosis is a vaginal infection that occurs when the normal balance of bacteria in the vagina is disrupted.  · Because bacterial vaginosis increases your risk for STIs (sexually transmitted infections), getting treated can help reduce your risk for chlamydia, gonorrhea, herpes, and HIV (human immunodeficiency virus). Treatment is also important for preventing complications in pregnant women, because the condition can cause an early (premature) delivery.  · This condition is treated with antibiotic medicines. These may be given as a pill, a vaginal cream, or a medicine that is put into the vagina (suppository).  This information is not intended to replace advice given to you by your health care provider. Make sure you discuss any questions you have with your health care provider.  Document Released: 12/18/2006 Document Revised: 11/30/2018 Document Reviewed: 09/02/2017  Elsevier Patient Education © 2020 Elsevier Inc.

## 2020-09-04 ENCOUNTER — HOSPITAL ENCOUNTER (OUTPATIENT)
Dept: LAB | Facility: MEDICAL CENTER | Age: 35
End: 2020-09-04
Attending: OBSTETRICS & GYNECOLOGY
Payer: MEDICAID

## 2020-09-04 LAB
BASOPHILS # BLD AUTO: 0.2 % (ref 0–1.8)
BASOPHILS # BLD: 0.02 K/UL (ref 0–0.12)
EOSINOPHIL # BLD AUTO: 0.02 K/UL (ref 0–0.51)
EOSINOPHIL NFR BLD: 0.2 % (ref 0–6.9)
ERYTHROCYTE [DISTWIDTH] IN BLOOD BY AUTOMATED COUNT: 46.5 FL (ref 35.9–50)
GLUCOSE 1H P 50 G GLC PO SERPL-MCNC: 159 MG/DL (ref 70–139)
HCT VFR BLD AUTO: 38.9 % (ref 37–47)
HGB BLD-MCNC: 12.7 G/DL (ref 12–16)
IMM GRANULOCYTES # BLD AUTO: 0.07 K/UL (ref 0–0.11)
IMM GRANULOCYTES NFR BLD AUTO: 0.8 % (ref 0–0.9)
LYMPHOCYTES # BLD AUTO: 1.12 K/UL (ref 1–4.8)
LYMPHOCYTES NFR BLD: 12.3 % (ref 22–41)
MCH RBC QN AUTO: 30.6 PG (ref 27–33)
MCHC RBC AUTO-ENTMCNC: 32.6 G/DL (ref 33.6–35)
MCV RBC AUTO: 93.7 FL (ref 81.4–97.8)
MONOCYTES # BLD AUTO: 0.64 K/UL (ref 0–0.85)
MONOCYTES NFR BLD AUTO: 7 % (ref 0–13.4)
NEUTROPHILS # BLD AUTO: 7.27 K/UL (ref 2–7.15)
NEUTROPHILS NFR BLD: 79.5 % (ref 44–72)
NRBC # BLD AUTO: 0 K/UL
NRBC BLD-RTO: 0 /100 WBC
PLATELET # BLD AUTO: 134 K/UL (ref 164–446)
PMV BLD AUTO: 11 FL (ref 9–12.9)
RBC # BLD AUTO: 4.15 M/UL (ref 4.2–5.4)
TREPONEMA PALLIDUM IGG+IGM AB [PRESENCE] IN SERUM OR PLASMA BY IMMUNOASSAY: NORMAL
WBC # BLD AUTO: 9.1 K/UL (ref 4.8–10.8)

## 2020-09-04 PROCEDURE — 82950 GLUCOSE TEST: CPT

## 2020-09-04 PROCEDURE — 85025 COMPLETE CBC W/AUTO DIFF WBC: CPT

## 2020-09-04 PROCEDURE — 36415 COLL VENOUS BLD VENIPUNCTURE: CPT

## 2020-09-04 PROCEDURE — 86780 TREPONEMA PALLIDUM: CPT

## 2020-09-22 ENCOUNTER — APPOINTMENT (OUTPATIENT)
Dept: OTHER | Facility: IMAGING CENTER | Age: 35
End: 2020-09-22

## 2020-09-22 ENCOUNTER — HOSPITAL ENCOUNTER (OUTPATIENT)
Dept: LAB | Facility: MEDICAL CENTER | Age: 35
End: 2020-09-22
Attending: OBSTETRICS & GYNECOLOGY
Payer: MEDICAID

## 2020-09-22 LAB
GLUCOSE 1H P CHAL SERPL-MCNC: 166 MG/DL (ref 65–180)
GLUCOSE 2H P CHAL SERPL-MCNC: 155 MG/DL (ref 65–155)
GLUCOSE 3H P CHAL SERPL-MCNC: 116 MG/DL (ref 65–140)
GLUCOSE BS SERPL-MCNC: 77 MG/DL (ref 65–95)

## 2020-09-22 PROCEDURE — 36415 COLL VENOUS BLD VENIPUNCTURE: CPT

## 2020-09-22 PROCEDURE — 82951 GLUCOSE TOLERANCE TEST (GTT): CPT

## 2020-09-22 PROCEDURE — 82952 GTT-ADDED SAMPLES: CPT

## 2020-10-02 ENCOUNTER — HOSPITAL ENCOUNTER (OUTPATIENT)
Facility: MEDICAL CENTER | Age: 35
End: 2020-10-02
Attending: OBSTETRICS & GYNECOLOGY | Admitting: OBSTETRICS & GYNECOLOGY
Payer: MEDICAID

## 2020-10-02 VITALS
SYSTOLIC BLOOD PRESSURE: 118 MMHG | DIASTOLIC BLOOD PRESSURE: 71 MMHG | WEIGHT: 153 LBS | TEMPERATURE: 97.8 F | OXYGEN SATURATION: 97 % | HEIGHT: 63 IN | BODY MASS INDEX: 27.11 KG/M2 | RESPIRATION RATE: 18 BRPM | HEART RATE: 88 BPM

## 2020-10-02 PROCEDURE — 59025 FETAL NON-STRESS TEST: CPT

## 2020-10-02 ASSESSMENT — FIBROSIS 4 INDEX: FIB4 SCORE: 1.04

## 2020-10-03 NOTE — DISCHARGE INSTRUCTIONS
Fetal Movement Counts  Patient Name: ________________________________________________ Patient Due Date: ____________________  What is a fetal movement count?    A fetal movement count is the number of times that you feel your baby move during a certain amount of time. This may also be called a fetal kick count. A fetal movement count is recommended for every pregnant woman. You may be asked to start counting fetal movements as early as week 28 of your pregnancy.  Pay attention to when your baby is most active. You may notice your baby's sleep and wake cycles. You may also notice things that make your baby move more. You should do a fetal movement count:  · When your baby is normally most active.  · At the same time each day.  A good time to count movements is while you are resting, after having something to eat and drink.  How do I count fetal movements?  1. Find a quiet, comfortable area. Sit, or lie down on your side.  2. Write down the date, the start time and stop time, and the number of movements that you felt between those two times. Take this information with you to your health care visits.  3. For 2 hours, count kicks, flutters, swishes, rolls, and jabs. You should feel at least 10 movements during 2 hours.  4. You may stop counting after you have felt 10 movements.  5. If you do not feel 10 movements in 2 hours, have something to eat and drink. Then, keep resting and counting for 1 hour. If you feel at least 4 movements during that hour, you may stop counting.  Contact a health care provider if:  · You feel fewer than 4 movements in 2 hours.  · Your baby is not moving like he or she usually does.  Date: ____________ Start time: ____________ Stop time: ____________ Movements: ____________  Date: ____________ Start time: ____________ Stop time: ____________ Movements: ____________  Date: ____________ Start time: ____________ Stop time: ____________ Movements: ____________  Date: ____________ Start time:  ____________ Stop time: ____________ Movements: ____________  Date: ____________ Start time: ____________ Stop time: ____________ Movements: ____________  Date: ____________ Start time: ____________ Stop time: ____________ Movements: ____________  Date: ____________ Start time: ____________ Stop time: ____________ Movements: ____________  Date: ____________ Start time: ____________ Stop time: ____________ Movements: ____________  Date: ____________ Start time: ____________ Stop time: ____________ Movements: ____________  This information is not intended to replace advice given to you by your health care provider. Make sure you discuss any questions you have with your health care provider.  Document Released: 01/17/2008 Document Revised: 01/07/2020 Document Reviewed: 01/26/2017  Elsevier Patient Education © 2020 Elsevier Inc.  Pre-term Labor (<37 weeks):  Call your physician or return to the hospital if:  · You have painless regular contractions more than 4 in one hour.  · Your water breaks (remember time and color).  · You have menstrual-like cramps, a low dull backache or pressure in your pelvis or back.  · Your baby does not move enough to complete the daily kick count (10 movements in 2 hours).  · Your baby moves much less often than on the days before or you have not felt your baby move all day.  · Please review the MEDICATION LIST section of your AFTER VISIT SUMMARY document.  · Take your medication as prescribed

## 2020-10-03 NOTE — PROGRESS NOTES
Summary of events:   EDC 2020 EGA 30.3    - Pt presented to L&D for c/o UC's every 30 minutes. Per pt she was seen at [facility] yesterday and had a negative FFN. Pt denies LOF, vaginal bleeding, states + fetal movement. EFM and TOCO applied. Will monitor and update Dr. Murdock.    - Dr. Murdock notified of pt arrival, minimal uterine irritability, occasional variables with overall reactive NST. Orders to discharge home received.    - Pre term labor precautions and fetal kick count instructions given to pt. Pt nad family verbalize an understanding. Questions answered.    - Pt discharged home with family.

## 2020-10-05 ENCOUNTER — TELEPHONE (OUTPATIENT)
Dept: CARDIOLOGY | Facility: MEDICAL CENTER | Age: 35
End: 2020-10-05

## 2020-10-05 NOTE — TELEPHONE ENCOUNTER
Spoke with pt who confirmed this will be first time seeing a cardiologist. Confirmed referring providers notes are in system and all recent lab work and cardiac testing is in pts chart.

## 2020-10-06 ENCOUNTER — OFFICE VISIT (OUTPATIENT)
Dept: CARDIOLOGY | Facility: MEDICAL CENTER | Age: 35
End: 2020-10-06
Payer: MEDICAID

## 2020-10-06 VITALS
OXYGEN SATURATION: 97 % | WEIGHT: 156.2 LBS | HEIGHT: 63 IN | HEART RATE: 102 BPM | SYSTOLIC BLOOD PRESSURE: 110 MMHG | BODY MASS INDEX: 27.68 KG/M2 | RESPIRATION RATE: 12 BRPM | DIASTOLIC BLOOD PRESSURE: 82 MMHG

## 2020-10-06 DIAGNOSIS — I47.10 SVT (SUPRAVENTRICULAR TACHYCARDIA) (HCC): ICD-10-CM

## 2020-10-06 PROBLEM — E03.9 HYPOTHYROIDISM: Status: ACTIVE | Noted: 2018-12-05

## 2020-10-06 PROBLEM — K31.84 GASTROPARESIS: Status: ACTIVE | Noted: 2018-12-05

## 2020-10-06 PROBLEM — F41.9 ANXIETY: Status: ACTIVE | Noted: 2018-12-05

## 2020-10-06 PROBLEM — Q79.60 EHLERS-DANLOS SYNDROME: Status: ACTIVE | Noted: 2020-04-28

## 2020-10-06 PROBLEM — F31.81 BIPOLAR II DISORDER (HCC): Status: ACTIVE | Noted: 2019-07-18

## 2020-10-06 PROCEDURE — 99204 OFFICE O/P NEW MOD 45 MIN: CPT | Performed by: INTERNAL MEDICINE

## 2020-10-06 PROCEDURE — 93000 ELECTROCARDIOGRAM COMPLETE: CPT | Performed by: INTERNAL MEDICINE

## 2020-10-06 ASSESSMENT — FIBROSIS 4 INDEX: FIB4 SCORE: 1.04

## 2020-10-06 NOTE — PROGRESS NOTES
Cardiology Initial Consultation Note    Date of note:    10/6/2020    Primary Care Provider: Colette Tinsley M.D.  Referring Provider: Nicol Rbieiro M.D.     Patient Name: Kristen Luna   YOB: 1985  MRN:              [id number]    Chief Complaint: Establish care, high risk pregnancy, Sandra-Danlos syndrome    Kristen Luna is a 35 y.o. female  patient presented today to [facility] also get an echocardiogram.  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  Currently she is 31-week pregnant.  She also had history of SVT, anxiety, bipolar disorder, hypothyroidism.  She feels okay currently, denies chest pain, shortness of breath, palpitations.  She does have some pedal edema.  Blood pressure has been okay    ROS  Nausea vomiting.  All other systems reviewed and discussed using a comprehensive questionnaire and are negative.     Past medical history, family history, social history, allergies and labs are reviewed and updated as needed as documented below.    Past Medical History:   Diagnosis Date   • Arrhythmia     SVT no episodes since 1997   • Bipolar affective disorder (HCC)    • Breath shortness     at [id number]; primary MD aware   • Gastroparesis    • Heart burn    • Hypothyroid    • Pain     Left knee 5/10   • PTSD (post-traumatic stress disorder)          Past Surgical History:   Procedure Laterality Date   • KNEE ARTHROSCOPY  1/4/2010    Performed by KRYSTIAN COSTA at [facility]   • SYNOVECTOMY  1/4/2010    Performed by KRYSTIAN COSTA at [facility]   • MEDIAL MENISCECTOMY  1/4/2010    Performed by KRYSITAN COSTA at [facility]   • KNEE ARTHROSCOPY  10/2009    left   • DENTAL EXTRACTION(S)  2007    wisdom teeth         Current Outpatient Medications   Medication Sig Dispense Refill   • Doxylamine Succinate, Sleep, (SLEEP AID PO) Take  by mouth.     • ondansetron  (ZOFRAN ODT) 4 MG TABLET DISPERSIBLE Take 1 Tab by mouth every 8 hours as needed. 20 Tab 0   • ziprasidone (GEODON) 20 MG Cap Take 40 mg by mouth 2 Times a Day.     • Prenatal Multivit-Min-Fe-FA (PRE- PO) Take  by mouth.     • levothyroxine (SYNTHROID) 75 MCG Tab Take 88 mcg by mouth Every morning on an empty stomach.     • Gabapentin (NEURONTIN PO) Take 300 mg by mouth every bedtime.       No current facility-administered medications for this visit.          Allergies   Allergen Reactions   • Codeine    • Hydrocodone Itching   • Naprosyn [Naproxen] Hives   • Reglan  [Metoclopramide]    • Zoloft      Left hand swelling   • Fentanyl Photosensitivity     Reports blurred vision          Family History   Problem Relation Age of Onset   • Diabetes Other    • Heart Disease Mother          Social History     Socioeconomic History   • Marital status: Single     Spouse name: Not on file   • Number of children: Not on file   • Years of education: Not on file   • Highest education level: Not on file   Occupational History   • Not on file   Social Needs   • Financial resource strain: Not on file   • Food insecurity     Worry: Not on file     Inability: Not on file   • Transportation needs     Medical: Not on file     Non-medical: Not on file   Tobacco Use   • Smoking status: Former Smoker     Packs/day: 0.50     Years: 1.50     Pack years: 0.75     Quit date: 2009     Years since quittin.1   • Smokeless tobacco: Never Used   • Tobacco comment: one pack every 3months   Substance and Sexual Activity   • Alcohol use: No   • Drug use: No   • Sexual activity: Not on file   Lifestyle   • Physical activity     Days per week: Not on file     Minutes per session: Not on file   • Stress: Not on file   Relationships   • Social connections     Talks on phone: Not on file     Gets together: Not on file     Attends Orthodox service: Not on file     Active member of club or organization: Not on file     Attends meetings of  "clubs or organizations: Not on file     Relationship status: Not on file   • Intimate partner violence     Fear of current or ex partner: Not on file     Emotionally abused: Not on file     Physically abused: Not on file     Forced sexual activity: Not on file   Other Topics Concern   • Not on file   Social History Narrative   • Not on file         Physical Exam:  Ambulatory Vitals  /82 (BP Location: Left arm, Patient Position: Sitting, BP Cuff Size: Adult)   Pulse (!) 102   Resp 12   Ht 1.6 m (5' 3\")   Wt 70.9 kg (156 lb 3.2 oz)   SpO2 97%    Oxygen Therapy:  Pulse Oximetry: 97 %  BP Readings from Last 4 Encounters:   10/06/20 110/82   10/02/20 118/71   06/30/20 110/71   05/13/20 145/89       Weight/BMI: Body mass index is 27.67 kg/m².  Wt Readings from Last 4 Encounters:   10/06/20 70.9 kg (156 lb 3.2 oz)   10/02/20 69.4 kg (153 lb)   06/30/20 66.6 kg (146 lb 13.2 oz)   05/13/20 67.8 kg (149 lb 7.6 oz)       General: Well appearing and in no apparent distress  Head: atrumatic  Eyes: No conjunctival pallor   ENT: normal external appearance of nose and ears  Neck: JVD absent, carotid bruits absent  Lungs: respiratory sounds  normal, additional breath sounds absent  Heart: Regular rhythm,   No palpable thrills on palpation, murmurs absent, no rubs,   Lower extremity edema absent.   Pedal pulses normal  Abdomen: soft, non tender, non distended.  Extremities/MSK: no clubbing, no cyanosis  Neurological: normal orientation, Gait normal   Psychiatric: Appropriate affect, intact judgement and insight  Skin: Warm extremities        Lab Data Review:  Lab Results   Component Value Date/Time    CHOLSTRLTOT 219 (H) 08/08/2019 11:19 AM     (H) 08/08/2019 11:19 AM    HDL 45 08/08/2019 11:19 AM    TRIGLYCERIDE 116 08/08/2019 11:19 AM       Lab Results   Component Value Date/Time    SODIUM 134 (L) 05/13/2020 12:20 PM    POTASSIUM 3.4 (L) 05/13/2020 12:20 PM    CHLORIDE 100 05/13/2020 12:20 PM    CO2 20 05/13/2020 " 12:20 PM    GLUCOSE 90 2020 12:20 PM    BUN 10 2020 12:20 PM    CREATININE 0.35 (L) 2020 12:20 PM    CREATININE 0.6 2007 08:00 PM     Lab Results   Component Value Date/Time    ALKPHOSPHAT 55 2020 12:20 PM    ASTSGOT 12 2020 12:20 PM    ALTSGPT 9 2020 12:20 PM    TBILIRUBIN 0.8 2020 12:20 PM      Lab Results   Component Value Date/Time    WBC 9.1 2020 10:15 AM     EKG sinus tachycardia, PAC    Medical Decision Makin-year-old female patient with history of Sandra-Danlos syndrome, SVT here to establish care.  On exam euvolemic today, I did not hear any murmurs.  We will get echocardiogram to rule out any cardiomyopathy.  Advised to get in touch with me if she develops palpitations.    This note was dictated using Dragon speech recognition software.    Fam VERDIN  Interventional cardiologist  [facility], [address]  [address] [address]  Phone: [phone]  Fax: [phone]

## 2020-10-07 LAB — EKG IMPRESSION: NORMAL

## 2020-10-11 ENCOUNTER — HOSPITAL ENCOUNTER (OUTPATIENT)
Facility: MEDICAL CENTER | Age: 35
End: 2020-10-11
Attending: OBSTETRICS & GYNECOLOGY | Admitting: OBSTETRICS & GYNECOLOGY
Payer: MEDICAID

## 2020-10-11 VITALS
BODY MASS INDEX: 30.82 KG/M2 | SYSTOLIC BLOOD PRESSURE: 119 MMHG | DIASTOLIC BLOOD PRESSURE: 89 MMHG | HEIGHT: 60 IN | WEIGHT: 157 LBS

## 2020-10-11 LAB — FIBRONECTIN FETAL SPEC QL: NEGATIVE

## 2020-10-11 PROCEDURE — 82731 ASSAY OF FETAL FIBRONECTIN: CPT

## 2020-10-11 ASSESSMENT — FIBROSIS 4 INDEX: FIB4 SCORE: 1.04

## 2020-10-11 NOTE — PROGRESS NOTES
OLIVERIO 2020  GA 31w5d    Pt arrived C/O UCs every 2-3 minutes since 0400  yesterday morning. Denies LOF, VB, and reports +FM.       0230- TOCO/EFM applied. Minimal uterine activity noted and abdomen palpates soft but pt patient stating very uncomfortable and requesting something for the pain.   0300-Call to Dr. Bower. Orders for FFN and if negative can D/C home.   0320- FFN collected, SVE closed/.   0330- per Dr. Bower ok to discharge home. Tylenol. Rest and hydration. Had MD review strip. Will monitor longer and oral hydrate   0445- Per Dr. Bower pt ok to discharge home with  labor precautions. Oral hydration and rest.

## 2020-10-19 ENCOUNTER — HOSPITAL ENCOUNTER (EMERGENCY)
Facility: MEDICAL CENTER | Age: 35
End: 2020-10-19
Attending: OBSTETRICS & GYNECOLOGY | Admitting: OBSTETRICS & GYNECOLOGY
Payer: MEDICAID

## 2020-10-19 VITALS
BODY MASS INDEX: 30.66 KG/M2 | HEART RATE: 86 BPM | WEIGHT: 157 LBS | SYSTOLIC BLOOD PRESSURE: 111 MMHG | RESPIRATION RATE: 18 BRPM | TEMPERATURE: 97.1 F | OXYGEN SATURATION: 100 % | DIASTOLIC BLOOD PRESSURE: 66 MMHG

## 2020-10-19 PROCEDURE — 59025 FETAL NON-STRESS TEST: CPT

## 2020-10-19 RX ORDER — ONDANSETRON 4 MG/1
4 TABLET, ORALLY DISINTEGRATING ORAL EVERY 4 HOURS PRN
Status: DISCONTINUED | OUTPATIENT
Start: 2020-10-19 | End: 2020-10-20 | Stop reason: HOSPADM

## 2020-10-19 RX ORDER — VITAMIN A ACETATE, BETA CAROTENE, ASCORBIC ACID, CHOLECALCIFEROL, .ALPHA.-TOCOPHEROL ACETATE, DL-, THIAMINE MONONITRATE, RIBOFLAVIN, NIACINAMIDE, PYRIDOXINE HYDROCHLORIDE, FOLIC ACID, CYANOCOBALAMIN, CALCIUM CARBONATE, FERROUS FUMARATE, ZINC OXIDE, CUPRIC OXIDE 3080; 12; 120; 400; 1; 1.84; 3; 20; 22; 920; 25; 200; 27; 10; 2 [IU]/1; UG/1; MG/1; [IU]/1; MG/1; MG/1; MG/1; MG/1; MG/1; [IU]/1; MG/1; MG/1; MG/1; MG/1; MG/1
1 TABLET, FILM COATED ORAL
Status: DISCONTINUED | OUTPATIENT
Start: 2020-10-20 | End: 2020-10-20 | Stop reason: HOSPADM

## 2020-10-19 RX ORDER — LEVOTHYROXINE SODIUM 88 UG/1
88 TABLET ORAL
Status: DISCONTINUED | OUTPATIENT
Start: 2020-10-20 | End: 2020-10-20 | Stop reason: HOSPADM

## 2020-10-19 RX ORDER — GABAPENTIN 300 MG/1
300 CAPSULE ORAL NIGHTLY
Status: DISCONTINUED | OUTPATIENT
Start: 2020-10-19 | End: 2020-10-20 | Stop reason: HOSPADM

## 2020-10-19 RX ORDER — ZIPRASIDONE HYDROCHLORIDE 80 MG/1
80 CAPSULE ORAL DAILY
Status: DISCONTINUED | OUTPATIENT
Start: 2020-10-20 | End: 2020-10-20 | Stop reason: HOSPADM

## 2020-10-19 SDOH — ECONOMIC STABILITY: FOOD INSECURITY: WITHIN THE PAST 12 MONTHS, THE FOOD YOU BOUGHT JUST DIDN'T LAST AND YOU DIDN'T HAVE MONEY TO GET MORE.: NEVER TRUE

## 2020-10-19 SDOH — ECONOMIC STABILITY: TRANSPORTATION INSECURITY
IN THE PAST 12 MONTHS, HAS LACK OF TRANSPORTATION KEPT YOU FROM MEETINGS, WORK, OR FROM GETTING THINGS NEEDED FOR DAILY LIVING?: NO

## 2020-10-19 SDOH — ECONOMIC STABILITY: FOOD INSECURITY: WITHIN THE PAST 12 MONTHS, YOU WORRIED THAT YOUR FOOD WOULD RUN OUT BEFORE YOU GOT MONEY TO BUY MORE.: NEVER TRUE

## 2020-10-19 SDOH — ECONOMIC STABILITY: TRANSPORTATION INSECURITY
IN THE PAST 12 MONTHS, HAS THE LACK OF TRANSPORTATION KEPT YOU FROM MEDICAL APPOINTMENTS OR FROM GETTING MEDICATIONS?: NO

## 2020-10-19 ASSESSMENT — LIFESTYLE VARIABLES: EVER_SMOKED: YES

## 2020-10-19 ASSESSMENT — PATIENT HEALTH QUESTIONNAIRE - PHQ9
1. LITTLE INTEREST OR PLEASURE IN DOING THINGS: NOT AT ALL
2. FEELING DOWN, DEPRESSED, IRRITABLE, OR HOPELESS: NOT AT ALL
SUM OF ALL RESPONSES TO PHQ9 QUESTIONS 1 AND 2: 0

## 2020-10-19 ASSESSMENT — FIBROSIS 4 INDEX: FIB4 SCORE: 1.04

## 2020-10-20 NOTE — PROGRESS NOTES
32.6 Denies VB or LOF    PT present for no FM since yesterday (10/18/20) @1500  She stated she has tried eating and moving around but has had no FM.  She stated that she never meets her kick counts and that baby only moves about 4 times per hour.  She also stated she has been having UC's here and there but not consistently.     1725 Dr Abreu called, he will see PT.     1737 MD in department, Tracing reviewed, per Dr Brady yates to AR with labor precautions.    1740 PT to return for LOF, UC's, VB or decreased FM.

## 2020-10-20 NOTE — PROGRESS NOTES
"1900 Report from HARIS Roth RN. Assumed care of pt.  1930 Admission procedures completed. Pt oriented to room, call light. Pt denies feeling uc's/cramping/LOF/VB. Reports +FM at this time. EFM and TOCO placed for NST. Pt's mother at bedside. Dinner tray brought to pt. Answered questions, discussed POC.     Pt states that she has had it cleared by Analisa Marshall, L&D unit manager to have both her mother and FOB \"Simone\" at bedside for delivery due to high psychiatric/emotional needs. Pt's mother has Analisa's cell phone number and will call prior to coming to [facility].     2025 Pt heard crying in room by RN in arreola. RN at bedside, pt states that she just found out that her [employer]  passed away in a motorcycle accident and she is extremely upset. RN offered emotional support. Pt requesting to discharge home at this time as she wishes to be with her fiance and the rest of her family for support.   2030 Dr. Abreu called, discussed reactive NST and pt's desire to go home. Okay per MD for pt to d/c home and follow up with Dr. Murdock's office in the morning.   2050 Went over d/c instructions (including PTL precautions) with pt and her mother. Pt verbalizes understanding and signed d/c paperwork.   2055 Ambulated off unit in stable condition with all personal belongings.   "

## 2020-10-20 NOTE — PROGRESS NOTES
1800-report received from MARI Feliz RN. Pt feeling uncomfortable going home because she is lotus. Dr Abreu notified. Order received for overnight OBS and he will discuss with Dr Murdock in the AM.   1900-report  To NICA Mccrary RN

## 2020-10-26 ENCOUNTER — HOSPITAL ENCOUNTER (OUTPATIENT)
Dept: CARDIOLOGY | Facility: MEDICAL CENTER | Age: 35
End: 2020-10-26
Attending: INTERNAL MEDICINE
Payer: MEDICAID

## 2020-10-26 DIAGNOSIS — I47.10 SVT (SUPRAVENTRICULAR TACHYCARDIA) (HCC): ICD-10-CM

## 2020-10-26 PROCEDURE — 93306 TTE W/DOPPLER COMPLETE: CPT

## 2020-10-27 LAB
LV EJECT FRACT  99904: 60
LV EJECT FRACT MOD 2C 99903: 68.87
LV EJECT FRACT MOD 4C 99902: 64.76
LV EJECT FRACT MOD BP 99901: 67.05

## 2020-10-27 PROCEDURE — 93306 TTE W/DOPPLER COMPLETE: CPT | Mod: 26 | Performed by: INTERNAL MEDICINE

## 2020-10-28 ENCOUNTER — TELEPHONE (OUTPATIENT)
Dept: CARDIOLOGY | Facility: MEDICAL CENTER | Age: 35
End: 2020-10-28

## 2020-10-28 ENCOUNTER — HOSPITAL ENCOUNTER (OUTPATIENT)
Facility: MEDICAL CENTER | Age: 35
End: 2020-10-28
Attending: OBSTETRICS & GYNECOLOGY | Admitting: OBSTETRICS & GYNECOLOGY
Payer: MEDICAID

## 2020-10-28 VITALS
SYSTOLIC BLOOD PRESSURE: 132 MMHG | DIASTOLIC BLOOD PRESSURE: 72 MMHG | TEMPERATURE: 98 F | OXYGEN SATURATION: 97 % | RESPIRATION RATE: 18 BRPM | HEART RATE: 85 BPM

## 2020-10-28 PROCEDURE — 59025 FETAL NON-STRESS TEST: CPT

## 2020-10-28 PROCEDURE — 99201 HCHG OFFICE VISIT-NEW-LEVEL 1: CPT

## 2020-10-28 PROCEDURE — 99211 OFF/OP EST MAY X REQ PHY/QHP: CPT

## 2020-10-28 NOTE — TELEPHONE ENCOUNTER
Notified patient of normal echo. She was thankful for the call. She has no palpitations and no other symptoms to report. Follow up as needed. SC

## 2020-10-28 NOTE — TELEPHONE ENCOUNTER
----- Message from Fam Castelan M.D. sent at 10/27/2020  2:30 PM PDT -----  Please call patient and notify normal results

## 2020-10-29 NOTE — PROGRESS NOTES
34.1 presenting with lower ABD pressure. She denies UC's, VB, LOF and +FM     Report to Chauvin. Orders for SVE and to DC with reactive tracing,.    PT to return for decreased FM, LOF, VB.

## 2020-11-11 ENCOUNTER — HOSPITAL ENCOUNTER (EMERGENCY)
Facility: MEDICAL CENTER | Age: 35
End: 2020-11-11
Attending: OBSTETRICS & GYNECOLOGY | Admitting: OBSTETRICS & GYNECOLOGY
Payer: MEDICAID

## 2020-11-11 VITALS
HEART RATE: 99 BPM | RESPIRATION RATE: 18 BRPM | SYSTOLIC BLOOD PRESSURE: 120 MMHG | BODY MASS INDEX: 31.41 KG/M2 | WEIGHT: 160 LBS | DIASTOLIC BLOOD PRESSURE: 76 MMHG | HEIGHT: 60 IN | TEMPERATURE: 98 F

## 2020-11-11 PROCEDURE — 302449 STATCHG TRIAGE ONLY (STATISTIC)

## 2020-11-11 PROCEDURE — 59025 FETAL NON-STRESS TEST: CPT

## 2020-11-11 ASSESSMENT — FIBROSIS 4 INDEX: FIB4 SCORE: 1.04

## 2020-11-11 NOTE — PROGRESS NOTES
1332: Report received from HARIS oRth.     1511: Dr. Abreu update with patient labor status, orders received to discharge patient home with  labor precautions.     1515: Discharge instructions gone over with patient, all questions and concerns addressed.

## 2020-11-11 NOTE — PROGRESS NOTES
35 y.o.  EDC  (36.1 wks)     Pt presents to L&D c/o contractions since 300. Reports +FM and denies LOF or VB. Pt states her cervix was checked last week and she was closed. SVE by RN=unchanged.

## 2020-11-27 ENCOUNTER — HOSPITAL ENCOUNTER (EMERGENCY)
Facility: MEDICAL CENTER | Age: 35
End: 2020-11-27
Attending: OBSTETRICS & GYNECOLOGY | Admitting: OBSTETRICS & GYNECOLOGY
Payer: MEDICAID

## 2020-11-27 VITALS
DIASTOLIC BLOOD PRESSURE: 80 MMHG | WEIGHT: 170 LBS | BODY MASS INDEX: 30.12 KG/M2 | OXYGEN SATURATION: 95 % | SYSTOLIC BLOOD PRESSURE: 125 MMHG | HEIGHT: 63 IN | TEMPERATURE: 98.7 F | HEART RATE: 93 BPM | RESPIRATION RATE: 17 BRPM

## 2020-11-27 PROCEDURE — 59025 FETAL NON-STRESS TEST: CPT

## 2020-11-27 PROCEDURE — 302449 STATCHG TRIAGE ONLY (STATISTIC)

## 2020-11-27 ASSESSMENT — FIBROSIS 4 INDEX: FIB4 SCORE: 1.04

## 2020-11-28 NOTE — PROGRESS NOTES
1902-Pt presents to L&D c/o UC's every 3-4 minutes for the past 2 hours. Denies LOF or VB and confirms +FM. TOCO and EFM applied. VS taken. POC discussed.  1907-SVE as charted  1937-Phoned Dr. Martínze, updated on pt arrival/complaint/status, strip reviewed with provider, discharge orders received  1953-Pt discharged home, ambulatory and undelivered. Provided general and term labor instructions, understanding verbalized

## 2020-12-08 ENCOUNTER — ANESTHESIA (OUTPATIENT)
Dept: OBGYN | Facility: MEDICAL CENTER | Age: 35
End: 2020-12-08
Payer: MEDICAID

## 2020-12-08 ENCOUNTER — ANESTHESIA EVENT (OUTPATIENT)
Dept: OBGYN | Facility: MEDICAL CENTER | Age: 35
End: 2020-12-08
Payer: MEDICAID

## 2020-12-08 ENCOUNTER — APPOINTMENT (OUTPATIENT)
Dept: OBGYN | Facility: MEDICAL CENTER | Age: 35
End: 2020-12-08
Attending: OBSTETRICS & GYNECOLOGY
Payer: MEDICAID

## 2020-12-08 ENCOUNTER — HOSPITAL ENCOUNTER (INPATIENT)
Facility: MEDICAL CENTER | Age: 35
LOS: 4 days | End: 2020-12-12
Attending: OBSTETRICS & GYNECOLOGY | Admitting: OBSTETRICS & GYNECOLOGY
Payer: MEDICAID

## 2020-12-08 DIAGNOSIS — G89.18 POST-OP PAIN: ICD-10-CM

## 2020-12-08 LAB
BASOPHILS # BLD AUTO: 0.2 % (ref 0–1.8)
BASOPHILS # BLD: 0.02 K/UL (ref 0–0.12)
COVID ORDER STATUS COVID19: NORMAL
EOSINOPHIL # BLD AUTO: 0.12 K/UL (ref 0–0.51)
EOSINOPHIL NFR BLD: 1.3 % (ref 0–6.9)
ERYTHROCYTE [DISTWIDTH] IN BLOOD BY AUTOMATED COUNT: 46 FL (ref 35.9–50)
HCT VFR BLD AUTO: 37.2 % (ref 37–47)
HGB BLD-MCNC: 12.2 G/DL (ref 12–16)
HOLDING TUBE BB 8507: NORMAL
IMM GRANULOCYTES # BLD AUTO: 0.04 K/UL (ref 0–0.11)
IMM GRANULOCYTES NFR BLD AUTO: 0.4 % (ref 0–0.9)
LYMPHOCYTES # BLD AUTO: 1.45 K/UL (ref 1–4.8)
LYMPHOCYTES NFR BLD: 15.7 % (ref 22–41)
MCH RBC QN AUTO: 30.1 PG (ref 27–33)
MCHC RBC AUTO-ENTMCNC: 32.8 G/DL (ref 33.6–35)
MCV RBC AUTO: 91.9 FL (ref 81.4–97.8)
MONOCYTES # BLD AUTO: 0.96 K/UL (ref 0–0.85)
MONOCYTES NFR BLD AUTO: 10.4 % (ref 0–13.4)
NEUTROPHILS # BLD AUTO: 6.63 K/UL (ref 2–7.15)
NEUTROPHILS NFR BLD: 72 % (ref 44–72)
NRBC # BLD AUTO: 0 K/UL
NRBC BLD-RTO: 0 /100 WBC
PLATELET # BLD AUTO: 114 K/UL (ref 164–446)
PMV BLD AUTO: 12 FL (ref 9–12.9)
RBC # BLD AUTO: 4.05 M/UL (ref 4.2–5.4)
SARS-COV-2 RDRP RESP QL NAA+PROBE: NOTDETECTED
SPECIMEN SOURCE: NORMAL
WBC # BLD AUTO: 9.2 K/UL (ref 4.8–10.8)

## 2020-12-08 PROCEDURE — A9270 NON-COVERED ITEM OR SERVICE: HCPCS | Performed by: OBSTETRICS & GYNECOLOGY

## 2020-12-08 PROCEDURE — 3E0P7VZ INTRODUCTION OF HORMONE INTO FEMALE REPRODUCTIVE, VIA NATURAL OR ARTIFICIAL OPENING: ICD-10-PCS | Performed by: OBSTETRICS & GYNECOLOGY

## 2020-12-08 PROCEDURE — C9803 HOPD COVID-19 SPEC COLLECT: HCPCS | Performed by: OBSTETRICS & GYNECOLOGY

## 2020-12-08 PROCEDURE — 770002 HCHG ROOM/CARE - OB PRIVATE (112)

## 2020-12-08 PROCEDURE — U0004 COV-19 TEST NON-CDC HGH THRU: HCPCS

## 2020-12-08 PROCEDURE — 700102 HCHG RX REV CODE 250 W/ 637 OVERRIDE(OP): Performed by: OBSTETRICS & GYNECOLOGY

## 2020-12-08 PROCEDURE — 85025 COMPLETE CBC W/AUTO DIFF WBC: CPT

## 2020-12-08 RX ORDER — GABAPENTIN 300 MG/1
300 CAPSULE ORAL EVERY EVENING
Status: DISCONTINUED | OUTPATIENT
Start: 2020-12-08 | End: 2020-12-12 | Stop reason: HOSPADM

## 2020-12-08 RX ORDER — CALCIUM CARBONATE 500 MG/1
1000 TABLET, CHEWABLE ORAL DAILY
Status: DISCONTINUED | OUTPATIENT
Start: 2020-12-08 | End: 2020-12-12 | Stop reason: HOSPADM

## 2020-12-08 RX ORDER — ACETAMINOPHEN 325 MG/1
325 TABLET ORAL EVERY 4 HOURS PRN
Status: CANCELLED | OUTPATIENT
Start: 2020-12-08

## 2020-12-08 RX ORDER — ONDANSETRON 2 MG/ML
4 INJECTION INTRAMUSCULAR; INTRAVENOUS EVERY 6 HOURS PRN
Status: CANCELLED | OUTPATIENT
Start: 2020-12-08

## 2020-12-08 RX ORDER — ONDANSETRON 4 MG/1
4 TABLET, ORALLY DISINTEGRATING ORAL EVERY 6 HOURS PRN
Status: CANCELLED | OUTPATIENT
Start: 2020-12-08

## 2020-12-08 RX ORDER — HYDROXYZINE 50 MG/1
50 TABLET, FILM COATED ORAL EVERY 6 HOURS PRN
Status: DISCONTINUED | OUTPATIENT
Start: 2020-12-08 | End: 2020-12-09 | Stop reason: HOSPADM

## 2020-12-08 RX ORDER — MISOPROSTOL 200 UG/1
800 TABLET ORAL
Status: DISCONTINUED | OUTPATIENT
Start: 2020-12-08 | End: 2020-12-09 | Stop reason: HOSPADM

## 2020-12-08 RX ORDER — SODIUM CHLORIDE, SODIUM LACTATE, POTASSIUM CHLORIDE, CALCIUM CHLORIDE 600; 310; 30; 20 MG/100ML; MG/100ML; MG/100ML; MG/100ML
INJECTION, SOLUTION INTRAVENOUS CONTINUOUS
Status: ACTIVE | OUTPATIENT
Start: 2020-12-08 | End: 2020-12-09

## 2020-12-08 RX ADMIN — MISOPROSTOL 25 MCG: 100 TABLET ORAL at 21:02

## 2020-12-08 RX ADMIN — GABAPENTIN 300 MG: 300 CAPSULE ORAL at 22:20

## 2020-12-08 RX ADMIN — ANTACID TABLETS 1000 MG: 500 TABLET, CHEWABLE ORAL at 21:48

## 2020-12-08 ASSESSMENT — PAIN SCALES - GENERAL: PAINLEVEL: 0 - NO PAIN

## 2020-12-08 ASSESSMENT — LIFESTYLE VARIABLES: EVER_SMOKED: YES

## 2020-12-08 ASSESSMENT — FIBROSIS 4 INDEX: FIB4 SCORE: 1.04

## 2020-12-08 NOTE — H&P
Obstetrics & Gynecology History & Physical Note    Date of Service  2020    Chief Complaint  Induction of labor    History of Presenting Illness  Kristen Luna is a 35 y.o.  at 40w0d 2020, Date entered prior to episode creation. Patient's last menstrual period was 2020 (exact date). She is being admitted for induction of labor. Her pregnancy is complicated by AMA, hypothyroidism, Bipolar disorder, anxiety and Sandra-Danlos syndrome (Connective tissue disorder.)    Prenatal care is at Dr Murdock and is complicated by:  1. AMA, hypothyroidism, Bipolar disorder, anxiety and Sandra-Danlos syndrome (Connective tissue disorder.)      Patient Active Problem List    Diagnosis Date Noted   • Sandra-Danlos syndrome Type III 2020   • Bipolar II disorder (HCC) 2019   • Anxiety 2018   • Gastroparesis 2018   • Hypothyroidism 2018       Obstetric History  OB History    Para Term  AB Living   1             SAB TAB Ectopic Molar Multiple Live Births                    # Outcome Date GA Lbr Ernesto/2nd Weight Sex Delivery Anes PTL Lv   1 Current                Gynecologic History  Denies oral or genital HSV    Review of Systems  Denies HA, EGP, RUQ pain, or visual changes    Medical History   has a past medical history of Arrhythmia, Bipolar affective disorder (HCC), Breath shortness, Connective tissue anomaly, Depression, Gastroparesis, Heart burn, Hypothyroid, Pain, and PTSD (post-traumatic stress disorder).    Surgical History   has a past surgical history that includes knee arthroscopy (10/2009); dental extraction(s) (); knee arthroscopy (2010); synovectomy (2010); medial meniscectomy (2010); and other ().     Family History  family history includes Diabetes in an other family member; Heart Disease in her mother.     Social History   reports that she quit smoking about 11 years ago. She has a 0.75 pack-year smoking history. She has never used  smokeless tobacco. She reports that she does not drink alcohol or use drugs.    Allergies  Allergies   Allergen Reactions   • Codeine    • Hydrocodone Itching   • Naprosyn [Naproxen] Hives   • Reglan  [Metoclopramide]    • Zoloft      Left hand swelling   • Fentanyl Photosensitivity     Reports blurred vision        Medications  Cannot display prior to admission medications because the patient has not been admitted in this contact.       Physical Exam  There were no vitals filed for this visit.    General:   alert, cooperative, no distress   Skin:   normal   HEENT:  extraocular movements intact   Lungs:   clear to auscultation bilaterally   Heart:   regular rate and rhythm   Breasts:   deferred   Abdomen:  Abdomen soft, non-tender; gravid.   Pelvis: Exam deferred.   FHT:  present in office on 12/7 at last office visit    [place]:     Presentations:   vertex by exam   Cervix:     Dilation:  0    Effacement:  50    Station:   -2    Consistency:  soft    Position:  mid       Laboratory:  Prenatal Results     General (Most Recent Result)     Test Value Reference Range Date Time    ABO O   05/05/20 1143    Rh POS   05/05/20 1143    Antibody screen NEG   05/05/20 1143    HbA1c 5.1 % 0.0 - 5.6 08/08/19 1119    Gonorrhea Negative  Negative 06/30/20 1520    Chlamydia  by PCR Negative  Negative 06/30/20 1520    Chlamydia by DNA        RPR/Syphilus Non-Reactive  Non-Reactive 09/04/20 1015    HSV 1/2 by PCR (non-serum)        HSV 1/2 (serum)        HSV 1        HSV 2        HPV (16)        HPV (18)        HPV (other)        HBsAg Non-Reactive  Non-Reactive 05/05/20 1143    HIV-1 HIV-2 Antibodies Non-Reactive  Non Reactive 05/05/20 1143    Rubella 159.00 IU/mL  05/05/20 1143    Tb              Pap Smear (Most Recent Result)     Test Value Reference Range Date Time    Pap smear        Pap smear w/HPV        Pap smear w/CTNG        Pap smar w/HPV CTNG        Pap smear (reflex HPV ACUS)        Pap smear (reflex HPV ASCUS w/CTNG)           Pathology gyn specimen              Urinalysis (Most Recent Result)     Test Value Reference Range Date Time    Urinalysis        Urinalysis (culture if indicated)  Abnormal    (See Report)    20 1533    POC urinalysis  (See Report)    20 1312    Urine drug screen  (See Report)    19 1456    Urine drug screen (w/o conf)        Urine culture ([id number])        Urine culture ([id number])              1st Trimester     Test Value Reference Range Date Time    Hgb 15.3 g/dL 12.0 - 16.0 20 1220      14.4 g/dL 12.0 - 16.0 20 1106      15.2 g/dL 12.0 - 16.0 20 1143      14.2 g/dL 12.0 - 16.0 20 0822    Hct 43.5 % 37.0 - 47.0 20 1220      41.0 % 37.0 - 47.0 20 1106      46.0 % 37.0 - 47.0 20 1143      40.6 % 37.0 - 47.0 20 0822    Fasting Glucose Tolerance        GTT, 1 hour        GTT, 2 hours        GTT, 3 hours              2nd Trimester     Test Value Reference Range Date Time    Hgb 12.7 g/dL 12.0 - 16.0 20 1015    Hct 38.9 % 37.0 - 47.0 20 1015    Urinalysis        Urine Culture        AST        ALT        Uric Acid        Fasting Glucose Tolerance        GTT, 1 hour        GTT, 2 hours        GTT, 3 hours        Urine Protein/Creatinine Ratio              3rd Trimester     Test Value Reference Range Date Time    Hgb        Hct        Platelet count        GBS (GASPAR BROTH)        GBS (Direct)        Urinalysis        Urine Culture        Urine Drug Screen        Urine Protein/Creatinine Ratio              Congenital Disease Screening     Test Value Reference Range Date Time    First Trimester Screen        Quad Screen        Sickle Cell        Thalassemia        [facility]        Cystic Fibrosis Carrier Study                      Urinalysis:    No results found     Labs:  Rh+, immune, neg, neg, neg  Glucola elevated and 3 GTT NORMAL  GBS negative    Assessment:  35 y.o.  40w0d who presents with  Pregnancy    Plan:  No new Assessment &  Plan notes have been filed under this hospital service since the last note was generated.  Service: Obstetrics & Gynecology    1. Admit to L&D  2. Induction secondary to AMA and medical illnesses  3. FWB: appropriate  4. GBS: negative  5. Pain Control: as desired  Dr Bower aware of patient's scheduled admission and will assume overnight call coverage care of this patient    VTE prophylaxis: ISMAEL Murdock M.D.

## 2020-12-09 PROCEDURE — 160029 HCHG SURGERY MINUTES - 1ST 30 MINS LEVEL 4: Performed by: OBSTETRICS & GYNECOLOGY

## 2020-12-09 PROCEDURE — 700102 HCHG RX REV CODE 250 W/ 637 OVERRIDE(OP): Performed by: OBSTETRICS & GYNECOLOGY

## 2020-12-09 PROCEDURE — A9270 NON-COVERED ITEM OR SERVICE: HCPCS | Performed by: OBSTETRICS & GYNECOLOGY

## 2020-12-09 PROCEDURE — 770002 HCHG ROOM/CARE - OB PRIVATE (112)

## 2020-12-09 PROCEDURE — 700111 HCHG RX REV CODE 636 W/ 250 OVERRIDE (IP): Performed by: ANESTHESIOLOGY

## 2020-12-09 PROCEDURE — 160048 HCHG OR STATISTICAL LEVEL 1-5: Performed by: OBSTETRICS & GYNECOLOGY

## 2020-12-09 PROCEDURE — A9270 NON-COVERED ITEM OR SERVICE: HCPCS | Performed by: ANESTHESIOLOGY

## 2020-12-09 PROCEDURE — 700101 HCHG RX REV CODE 250: Performed by: ANESTHESIOLOGY

## 2020-12-09 PROCEDURE — 700111 HCHG RX REV CODE 636 W/ 250 OVERRIDE (IP): Performed by: OBSTETRICS & GYNECOLOGY

## 2020-12-09 PROCEDURE — 160041 HCHG SURGERY MINUTES - EA ADDL 1 MIN LEVEL 4: Performed by: OBSTETRICS & GYNECOLOGY

## 2020-12-09 PROCEDURE — 160009 HCHG ANES TIME/MIN: Performed by: OBSTETRICS & GYNECOLOGY

## 2020-12-09 PROCEDURE — 160035 HCHG PACU - 1ST 60 MINS PHASE I: Performed by: OBSTETRICS & GYNECOLOGY

## 2020-12-09 PROCEDURE — 700102 HCHG RX REV CODE 250 W/ 637 OVERRIDE(OP): Performed by: ANESTHESIOLOGY

## 2020-12-09 PROCEDURE — 160002 HCHG RECOVERY MINUTES (STAT): Performed by: OBSTETRICS & GYNECOLOGY

## 2020-12-09 PROCEDURE — 700105 HCHG RX REV CODE 258: Performed by: ANESTHESIOLOGY

## 2020-12-09 PROCEDURE — 700105 HCHG RX REV CODE 258: Performed by: OBSTETRICS & GYNECOLOGY

## 2020-12-09 RX ORDER — OXYCODONE HYDROCHLORIDE 10 MG/1
10 TABLET ORAL EVERY 4 HOURS PRN
Status: DISPENSED | OUTPATIENT
Start: 2020-12-09 | End: 2020-12-10

## 2020-12-09 RX ORDER — MEPERIDINE HYDROCHLORIDE 25 MG/ML
12.5 INJECTION INTRAMUSCULAR; INTRAVENOUS; SUBCUTANEOUS
Status: DISCONTINUED | OUTPATIENT
Start: 2020-12-09 | End: 2020-12-09 | Stop reason: HOSPADM

## 2020-12-09 RX ORDER — HYDROMORPHONE HYDROCHLORIDE 1 MG/ML
0.5 INJECTION, SOLUTION INTRAMUSCULAR; INTRAVENOUS; SUBCUTANEOUS
Status: DISCONTINUED | OUTPATIENT
Start: 2020-12-09 | End: 2020-12-09 | Stop reason: HOSPADM

## 2020-12-09 RX ORDER — OXYCODONE HCL 5 MG/5 ML
10 SOLUTION, ORAL ORAL
Status: DISCONTINUED | OUTPATIENT
Start: 2020-12-09 | End: 2020-12-09 | Stop reason: HOSPADM

## 2020-12-09 RX ORDER — BUPIVACAINE HYDROCHLORIDE 7.5 MG/ML
INJECTION, SOLUTION INTRASPINAL
Status: COMPLETED | OUTPATIENT
Start: 2020-12-09 | End: 2020-12-09

## 2020-12-09 RX ORDER — DEXAMETHASONE SODIUM PHOSPHATE 4 MG/ML
INJECTION, SOLUTION INTRA-ARTICULAR; INTRALESIONAL; INTRAMUSCULAR; INTRAVENOUS; SOFT TISSUE PRN
Status: DISCONTINUED | OUTPATIENT
Start: 2020-12-09 | End: 2020-12-09 | Stop reason: SURG

## 2020-12-09 RX ORDER — KETOROLAC TROMETHAMINE 30 MG/ML
30 INJECTION, SOLUTION INTRAMUSCULAR; INTRAVENOUS EVERY 6 HOURS
Status: CANCELLED | OUTPATIENT
Start: 2020-12-09 | End: 2020-12-10

## 2020-12-09 RX ORDER — METOCLOPRAMIDE HYDROCHLORIDE 5 MG/ML
10 INJECTION INTRAMUSCULAR; INTRAVENOUS ONCE
Status: COMPLETED | OUTPATIENT
Start: 2020-12-09 | End: 2020-12-09

## 2020-12-09 RX ORDER — CEFAZOLIN SODIUM 1 G/3ML
INJECTION, POWDER, FOR SOLUTION INTRAMUSCULAR; INTRAVENOUS PRN
Status: DISCONTINUED | OUTPATIENT
Start: 2020-12-09 | End: 2020-12-09 | Stop reason: SURG

## 2020-12-09 RX ORDER — ONDANSETRON 2 MG/ML
INJECTION INTRAMUSCULAR; INTRAVENOUS PRN
Status: DISCONTINUED | OUTPATIENT
Start: 2020-12-09 | End: 2020-12-09 | Stop reason: SURG

## 2020-12-09 RX ORDER — SODIUM CHLORIDE, SODIUM GLUCONATE, SODIUM ACETATE, POTASSIUM CHLORIDE AND MAGNESIUM CHLORIDE 526; 502; 368; 37; 30 MG/100ML; MG/100ML; MG/100ML; MG/100ML; MG/100ML
INJECTION, SOLUTION INTRAVENOUS
Status: DISCONTINUED | OUTPATIENT
Start: 2020-12-09 | End: 2020-12-09 | Stop reason: SURG

## 2020-12-09 RX ORDER — HYDROMORPHONE HYDROCHLORIDE 1 MG/ML
0.4 INJECTION, SOLUTION INTRAMUSCULAR; INTRAVENOUS; SUBCUTANEOUS
Status: DISCONTINUED | OUTPATIENT
Start: 2020-12-09 | End: 2020-12-09 | Stop reason: HOSPADM

## 2020-12-09 RX ORDER — SIMETHICONE 80 MG
80 TABLET,CHEWABLE ORAL 4 TIMES DAILY PRN
Status: DISCONTINUED | OUTPATIENT
Start: 2020-12-09 | End: 2020-12-12 | Stop reason: HOSPADM

## 2020-12-09 RX ORDER — OXYCODONE HYDROCHLORIDE 5 MG/1
5 TABLET ORAL EVERY 4 HOURS PRN
Status: ACTIVE | OUTPATIENT
Start: 2020-12-09 | End: 2020-12-10

## 2020-12-09 RX ORDER — ACETAMINOPHEN 500 MG
1000 TABLET ORAL EVERY 6 HOURS
Status: COMPLETED | OUTPATIENT
Start: 2020-12-09 | End: 2020-12-10

## 2020-12-09 RX ORDER — LABETALOL HYDROCHLORIDE 5 MG/ML
5 INJECTION, SOLUTION INTRAVENOUS
Status: DISCONTINUED | OUTPATIENT
Start: 2020-12-09 | End: 2020-12-09 | Stop reason: HOSPADM

## 2020-12-09 RX ORDER — OXYCODONE HCL 5 MG/5 ML
5 SOLUTION, ORAL ORAL
Status: DISCONTINUED | OUTPATIENT
Start: 2020-12-09 | End: 2020-12-09 | Stop reason: HOSPADM

## 2020-12-09 RX ORDER — SODIUM CHLORIDE, SODIUM LACTATE, POTASSIUM CHLORIDE, CALCIUM CHLORIDE 600; 310; 30; 20 MG/100ML; MG/100ML; MG/100ML; MG/100ML
INJECTION, SOLUTION INTRAVENOUS PRN
Status: DISCONTINUED | OUTPATIENT
Start: 2020-12-09 | End: 2020-12-12 | Stop reason: HOSPADM

## 2020-12-09 RX ORDER — HYDROMORPHONE HYDROCHLORIDE 1 MG/ML
0.2 INJECTION, SOLUTION INTRAMUSCULAR; INTRAVENOUS; SUBCUTANEOUS
Status: DISCONTINUED | OUTPATIENT
Start: 2020-12-09 | End: 2020-12-09 | Stop reason: HOSPADM

## 2020-12-09 RX ORDER — DOCUSATE SODIUM 100 MG/1
100 CAPSULE, LIQUID FILLED ORAL 2 TIMES DAILY PRN
Status: DISCONTINUED | OUTPATIENT
Start: 2020-12-09 | End: 2020-12-12 | Stop reason: HOSPADM

## 2020-12-09 RX ORDER — CITRIC ACID/SODIUM CITRATE 334-500MG
30 SOLUTION, ORAL ORAL ONCE
Status: COMPLETED | OUTPATIENT
Start: 2020-12-09 | End: 2020-12-09

## 2020-12-09 RX ORDER — HYDRALAZINE HYDROCHLORIDE 20 MG/ML
5 INJECTION INTRAMUSCULAR; INTRAVENOUS
Status: DISCONTINUED | OUTPATIENT
Start: 2020-12-09 | End: 2020-12-09 | Stop reason: HOSPADM

## 2020-12-09 RX ORDER — MISOPROSTOL 200 UG/1
800 TABLET ORAL
Status: DISCONTINUED | OUTPATIENT
Start: 2020-12-09 | End: 2020-12-12 | Stop reason: HOSPADM

## 2020-12-09 RX ORDER — SODIUM CHLORIDE, SODIUM GLUCONATE, SODIUM ACETATE, POTASSIUM CHLORIDE AND MAGNESIUM CHLORIDE 526; 502; 368; 37; 30 MG/100ML; MG/100ML; MG/100ML; MG/100ML; MG/100ML
1500 INJECTION, SOLUTION INTRAVENOUS ONCE
Status: COMPLETED | OUTPATIENT
Start: 2020-12-09 | End: 2020-12-09

## 2020-12-09 RX ORDER — VITAMIN A ACETATE, BETA CAROTENE, ASCORBIC ACID, CHOLECALCIFEROL, .ALPHA.-TOCOPHEROL ACETATE, DL-, THIAMINE MONONITRATE, RIBOFLAVIN, NIACINAMIDE, PYRIDOXINE HYDROCHLORIDE, FOLIC ACID, CYANOCOBALAMIN, CALCIUM CARBONATE, FERROUS FUMARATE, ZINC OXIDE, CUPRIC OXIDE 3080; 12; 120; 400; 1; 1.84; 3; 20; 22; 920; 25; 200; 27; 10; 2 [IU]/1; UG/1; MG/1; [IU]/1; MG/1; MG/1; MG/1; MG/1; MG/1; [IU]/1; MG/1; MG/1; MG/1; MG/1; MG/1
1 TABLET, FILM COATED ORAL
Status: DISCONTINUED | OUTPATIENT
Start: 2020-12-10 | End: 2020-12-12 | Stop reason: HOSPADM

## 2020-12-09 RX ORDER — SODIUM CHLORIDE, SODIUM LACTATE, POTASSIUM CHLORIDE, CALCIUM CHLORIDE 600; 310; 30; 20 MG/100ML; MG/100ML; MG/100ML; MG/100ML
1000 INJECTION, SOLUTION INTRAVENOUS CONTINUOUS
Status: ACTIVE | OUTPATIENT
Start: 2020-12-09 | End: 2020-12-09

## 2020-12-09 RX ORDER — DIPHENHYDRAMINE HYDROCHLORIDE 50 MG/ML
12.5 INJECTION INTRAMUSCULAR; INTRAVENOUS
Status: DISCONTINUED | OUTPATIENT
Start: 2020-12-09 | End: 2020-12-09 | Stop reason: HOSPADM

## 2020-12-09 RX ORDER — SODIUM CHLORIDE, SODIUM LACTATE, POTASSIUM CHLORIDE, CALCIUM CHLORIDE 600; 310; 30; 20 MG/100ML; MG/100ML; MG/100ML; MG/100ML
INJECTION, SOLUTION INTRAVENOUS CONTINUOUS
Status: CANCELLED | OUTPATIENT
Start: 2020-12-09

## 2020-12-09 RX ORDER — KETOROLAC TROMETHAMINE 30 MG/ML
30 INJECTION, SOLUTION INTRAMUSCULAR; INTRAVENOUS EVERY 6 HOURS
Status: DISPENSED | OUTPATIENT
Start: 2020-12-10 | End: 2020-12-10

## 2020-12-09 RX ORDER — KETOROLAC TROMETHAMINE 30 MG/ML
INJECTION, SOLUTION INTRAMUSCULAR; INTRAVENOUS PRN
Status: DISCONTINUED | OUTPATIENT
Start: 2020-12-09 | End: 2020-12-09 | Stop reason: SURG

## 2020-12-09 RX ORDER — HALOPERIDOL 5 MG/ML
1 INJECTION INTRAMUSCULAR
Status: DISCONTINUED | OUTPATIENT
Start: 2020-12-09 | End: 2020-12-09 | Stop reason: HOSPADM

## 2020-12-09 RX ORDER — MORPHINE SULFATE 0.5 MG/ML
INJECTION, SOLUTION EPIDURAL; INTRATHECAL; INTRAVENOUS
Status: COMPLETED | OUTPATIENT
Start: 2020-12-09 | End: 2020-12-09

## 2020-12-09 RX ADMIN — ACETAMINOPHEN 1000 MG: 500 TABLET ORAL at 21:00

## 2020-12-09 RX ADMIN — SODIUM CITRATE AND CITRIC ACID MONOHYDRATE 30 ML: 500; 334 SOLUTION ORAL at 16:45

## 2020-12-09 RX ADMIN — HYDROMORPHONE HYDROCHLORIDE 0.2 MG: 1 INJECTION, SOLUTION INTRAMUSCULAR; INTRAVENOUS; SUBCUTANEOUS at 18:37

## 2020-12-09 RX ADMIN — BUPIVACAINE HYDROCHLORIDE IN DEXTROSE 1.2 ML: 7.5 INJECTION, SOLUTION SUBARACHNOID at 17:02

## 2020-12-09 RX ADMIN — OXYTOCIN 125 ML/HR: 10 INJECTION, SOLUTION INTRAMUSCULAR; INTRAVENOUS at 18:27

## 2020-12-09 RX ADMIN — MORPHINE SULFATE 150 MCG: 0.5 INJECTION, SOLUTION EPIDURAL; INTRATHECAL; INTRAVENOUS at 17:02

## 2020-12-09 RX ADMIN — MISOPROSTOL 25 MCG: 100 TABLET ORAL at 06:02

## 2020-12-09 RX ADMIN — MISOPROSTOL 25 MCG: 100 TABLET ORAL at 11:25

## 2020-12-09 RX ADMIN — GABAPENTIN 300 MG: 300 CAPSULE ORAL at 20:09

## 2020-12-09 RX ADMIN — OXYTOCIN 1000 ML: 10 INJECTION, SOLUTION INTRAMUSCULAR; INTRAVENOUS at 17:22

## 2020-12-09 RX ADMIN — CEFAZOLIN 2 G: 330 INJECTION, POWDER, FOR SOLUTION INTRAMUSCULAR; INTRAVENOUS at 17:05

## 2020-12-09 RX ADMIN — ONDANSETRON 8 MG: 2 INJECTION INTRAMUSCULAR; INTRAVENOUS at 17:05

## 2020-12-09 RX ADMIN — SODIUM CHLORIDE, POTASSIUM CHLORIDE, SODIUM LACTATE AND CALCIUM CHLORIDE 1000 ML: 600; 310; 30; 20 INJECTION, SOLUTION INTRAVENOUS at 09:00

## 2020-12-09 RX ADMIN — PHENYLEPHRINE HYDROCHLORIDE 50 MCG/MIN: 10 INJECTION INTRAVENOUS at 17:04

## 2020-12-09 RX ADMIN — KETOROLAC TROMETHAMINE 30 MG: 30 INJECTION, SOLUTION INTRAMUSCULAR at 17:50

## 2020-12-09 RX ADMIN — DEXAMETHASONE SODIUM PHOSPHATE 10 MG: 4 INJECTION, SOLUTION INTRA-ARTICULAR; INTRALESIONAL; INTRAMUSCULAR; INTRAVENOUS; SOFT TISSUE at 17:05

## 2020-12-09 RX ADMIN — FAMOTIDINE 20 MG: 10 INJECTION INTRAVENOUS at 16:45

## 2020-12-09 RX ADMIN — SODIUM CHLORIDE, SODIUM GLUCONATE, SODIUM ACETATE, POTASSIUM CHLORIDE AND MAGNESIUM CHLORIDE 1500 ML: 526; 502; 368; 37; 30 INJECTION, SOLUTION INTRAVENOUS at 16:09

## 2020-12-09 RX ADMIN — SODIUM CHLORIDE, SODIUM GLUCONATE, SODIUM ACETATE, POTASSIUM CHLORIDE AND MAGNESIUM CHLORIDE: 526; 502; 368; 37; 30 INJECTION, SOLUTION INTRAVENOUS at 16:53

## 2020-12-09 RX ADMIN — METOCLOPRAMIDE 10 MG: 5 INJECTION, SOLUTION INTRAMUSCULAR; INTRAVENOUS at 16:45

## 2020-12-09 ASSESSMENT — PATIENT HEALTH QUESTIONNAIRE - PHQ9
1. LITTLE INTEREST OR PLEASURE IN DOING THINGS: NOT AT ALL
1. LITTLE INTEREST OR PLEASURE IN DOING THINGS: NOT AT ALL
SUM OF ALL RESPONSES TO PHQ9 QUESTIONS 1 AND 2: 0
2. FEELING DOWN, DEPRESSED, IRRITABLE, OR HOPELESS: NOT AT ALL
SUM OF ALL RESPONSES TO PHQ9 QUESTIONS 1 AND 2: 0
2. FEELING DOWN, DEPRESSED, IRRITABLE, OR HOPELESS: NOT AT ALL

## 2020-12-09 NOTE — PROGRESS NOTES
Pt comfortable- not feeling any CTXs  Admitted last evening for induction @ 40 weeks for AMA, Sandra-Danos, Polhydramnios  Received cytotec at approx 9 pm and then was lotus too frequently (although nover felt anything) for another dose until second dose was placed @ 0600  CX closed per RN @ 0600  CTXs q 2-7 min on monitor  FHTs reactive, moderate variability, category I  RN will recheck Cx at 1000 and update me for plan of care    awestfall

## 2020-12-09 NOTE — PROGRESS NOTES
"1900- report received from Yun Schmitz RN.   2100- Cytotec placed See MAR.    0140-SVE no change. Unable to feel presenting part., call to Dr. Sanchez. Pt feeling pretty \"crampy\" orders to let patient ride them out. If UCs space out. Can place cytotec  0600- SVE closed/80/-3. Cytotec placed See MAR. Dr. Go updated  "

## 2020-12-09 NOTE — PROGRESS NOTES
1720- G1 at 40-0 here for IOL for AMA and other medical illnesses- see prenatal record. Pt denies ctx, leaking, and bleeding. Reports +FM. States her cervix is closed. Accompanied by her mother Jessica and SO Simone. POC discussed.   1805- SVE closed/thin.  1900- Report to YOVANY Carrera RN.   1915- Call to Dr. Murdock. Orders in computer. Dr. Moctezuma is on call and aware of patient. Update to YOVANY Carrera RN.

## 2020-12-09 NOTE — PROGRESS NOTES
Pt feeling cramping mild  Cx closed/soft/-2  CTXs 2-4 on monitor  FHTs reactive, reassuring, category I  Pt is very frustrated and is requesting c/s. She states she is sure she will not go into labor and deliver vaginally. I discussed with her and we agreed on one more dose of cytotec (which I just placed #3) and will recheck in 4 hours and if unchanged will rediscuss her request for delivery via c/section secondary to failed induction/cervical ripening. All ?S answered.     awestfall

## 2020-12-09 NOTE — PROGRESS NOTES
0700-report received from APRYL Carrera RN. Dr Murdock updated on pt status. Pt ok to eat breakfast. Will repeat cervical exam around 1000.   0955-SVE=unchanged. MD updated  1125-3rd dose of cytotec placed by Dr Murdock. Will repeat cervical exam around 1530.   1530-SVE=unchanged. Dr Murdock notified. Pt to be prepped for 1700 primary C/S.   1655-pt in OR2.   1721-del of viable female, APGARs 8/8.  1745-pt in PACU in stable condition.

## 2020-12-10 LAB
ERYTHROCYTE [DISTWIDTH] IN BLOOD BY AUTOMATED COUNT: 46.5 FL (ref 35.9–50)
HCT VFR BLD AUTO: 29 % (ref 37–47)
HGB BLD-MCNC: 9.4 G/DL (ref 12–16)
MCH RBC QN AUTO: 30.3 PG (ref 27–33)
MCHC RBC AUTO-ENTMCNC: 32.4 G/DL (ref 33.6–35)
MCV RBC AUTO: 93.5 FL (ref 81.4–97.8)
PLATELET # BLD AUTO: 117 K/UL (ref 164–446)
PMV BLD AUTO: 12.4 FL (ref 9–12.9)
RBC # BLD AUTO: 3.1 M/UL (ref 4.2–5.4)
WBC # BLD AUTO: 13.2 K/UL (ref 4.8–10.8)

## 2020-12-10 PROCEDURE — 36415 COLL VENOUS BLD VENIPUNCTURE: CPT

## 2020-12-10 PROCEDURE — 700102 HCHG RX REV CODE 250 W/ 637 OVERRIDE(OP): Performed by: ANESTHESIOLOGY

## 2020-12-10 PROCEDURE — A9270 NON-COVERED ITEM OR SERVICE: HCPCS | Performed by: OBSTETRICS & GYNECOLOGY

## 2020-12-10 PROCEDURE — 700111 HCHG RX REV CODE 636 W/ 250 OVERRIDE (IP): Performed by: ANESTHESIOLOGY

## 2020-12-10 PROCEDURE — 700102 HCHG RX REV CODE 250 W/ 637 OVERRIDE(OP): Performed by: OBSTETRICS & GYNECOLOGY

## 2020-12-10 PROCEDURE — 85027 COMPLETE CBC AUTOMATED: CPT

## 2020-12-10 PROCEDURE — 51798 US URINE CAPACITY MEASURE: CPT

## 2020-12-10 PROCEDURE — A9270 NON-COVERED ITEM OR SERVICE: HCPCS | Performed by: ANESTHESIOLOGY

## 2020-12-10 PROCEDURE — 770002 HCHG ROOM/CARE - OB PRIVATE (112)

## 2020-12-10 RX ORDER — ZIPRASIDONE HYDROCHLORIDE 80 MG/1
80 CAPSULE ORAL DAILY
Status: DISCONTINUED | OUTPATIENT
Start: 2020-12-10 | End: 2020-12-12 | Stop reason: HOSPADM

## 2020-12-10 RX ADMIN — DOCUSATE SODIUM 100 MG: 100 CAPSULE ORAL at 11:38

## 2020-12-10 RX ADMIN — ACETAMINOPHEN 1000 MG: 500 TABLET ORAL at 08:58

## 2020-12-10 RX ADMIN — OXYCODONE HYDROCHLORIDE 10 MG: 10 TABLET ORAL at 11:38

## 2020-12-10 RX ADMIN — ACETAMINOPHEN 1000 MG: 500 TABLET ORAL at 15:10

## 2020-12-10 RX ADMIN — GABAPENTIN 300 MG: 300 CAPSULE ORAL at 17:52

## 2020-12-10 RX ADMIN — ACETAMINOPHEN 1000 MG: 500 TABLET ORAL at 03:20

## 2020-12-10 RX ADMIN — KETOROLAC TROMETHAMINE 30 MG: 30 INJECTION, SOLUTION INTRAMUSCULAR at 00:12

## 2020-12-10 RX ADMIN — OXYCODONE HYDROCHLORIDE 10 MG: 10 TABLET ORAL at 17:52

## 2020-12-10 RX ADMIN — PRENATAL WITH FERROUS FUM AND FOLIC ACID 1 TABLET: 3080; 920; 120; 400; 22; 1.84; 3; 20; 10; 1; 12; 200; 27; 25; 2 TABLET ORAL at 08:58

## 2020-12-10 RX ADMIN — KETOROLAC TROMETHAMINE 30 MG: 30 INJECTION, SOLUTION INTRAMUSCULAR at 11:39

## 2020-12-10 RX ADMIN — ZIPRASIDONE HYDROCHLORIDE 80 MG: 80 CAPSULE ORAL at 11:15

## 2020-12-10 NOTE — ANESTHESIA TIME REPORT
Anesthesia Start and Stop Event Times     Date Time Event    2020 1632 Ready for Procedure     1653 Anesthesia Start     1756 Anesthesia Stop        Responsible Staff  20    Name Role Begin End    Abhishek Ramon M.D. Anesth 1653 1756        Preop Diagnosis (Free Text):  Pre-op Diagnosis     IUP 40.1 wks, IOL, Failure to progress        Preop Diagnosis (Codes):    Post op Diagnosis   delivery affecting       Premium Reason  A. 3PM - 7AM    Comments:

## 2020-12-10 NOTE — CARE PLAN
Problem: Altered physiologic condition related to postoperative  delivery  Goal: Patient physiologically stable as evidenced by normal lochia, palpable uterine involution and vital signs within normal limits  Outcome: PROGRESSING AS EXPECTED  Note: Fundus firm, lochia light     Problem: Alteration in comfort related to surgical incision and/or after birth pains  Goal: Patient verbalizes acceptable pain level  Outcome: PROGRESSING AS EXPECTED  Note: Denies pain at this time, will call if pain med intervention needed.

## 2020-12-10 NOTE — OR SURGEON
Immediate Post OP Note    PreOp Diagnosis: IUP @ 40 , Sandra-Danlos, bipolar, polyhydramnios, failed cervical ripening    PostOp Diagnosis: same    Procedure(s):   SECTION, PRIMARY    Surgeon(s):  JOEL Liu M.D.    Anesthesiologist/Type of Anesthesia:  Anesthesiologist: Abhishek Ramon M.D./Spinal    Surgical Staff:  Circulator: Renetta Roth R.N.  Scrub Person: Hazel KAHN&BAM Baby  Nurse: Benita Crawford R.N.    Specimens removed if any:  * No specimens in log *    Estimated Blood Loss: 600cc    Findings: female, vertex, clear, 8 and 8  Normal uterus, tubes, ovaries    Complications: none    Dictated    2020 5:51 PM Malinda Murdock M.D.

## 2020-12-10 NOTE — ANESTHESIA PROCEDURE NOTES
Spinal Block    Date/Time: 12/9/2020 5:02 PM  Performed by: Abhishek Ramon M.D.  Authorized by: Abhishek Ramon M.D.     Patient Location:  OR  Start Time:  12/9/2020 5:02 PM  Reason for Block: primary anesthetic    patient identified, IV checked, site marked, risks and benefits discussed, surgical consent, monitors and equipment checked, pre-op evaluation and timeout performed    Patient Position:  Sitting  Prep: ChloraPrep, patient draped and sterile technique    Monitoring:  Blood pressure, continuous pulse oximetry and heart rate  Approach:  Midline  Location:  L4-5  Injection Technique:  Single-shot  Skin infiltration:  Lidocaine  Strength:  1%  Dose:  3ml  Needle Type:  Pencan  Needle Gauge:  25 G  CSF flowing pre/post injection:  Yes  Sensory Level:  T6

## 2020-12-10 NOTE — PROGRESS NOTES
0310 Report received from Jennyfer SANCHES RN. Assuming pt care at this time    0400 Pt ambulated to bathroom with 2 person stand by assist. Gait steady. Tolerated well.    1277 Report given to Marium SEGAL

## 2020-12-10 NOTE — LACTATION NOTE
This note was copied from a baby's chart.  Initial visit. Mother had infant already latched with RN assistance, but infant had finished and come off nipple when LC came to observe. She denies pain with latch. Educated on feeding behaviors and frequencies. Periods of sleepiness and clusterfeeding will be expected. Discussed early and late feeding cues.  Importance and benefits of skin to skin discussed. Left infant skin to skin with mother.     Plan is to breastfeed with cues, no more than 4 hours from last feeding, at least 8 or more feedings in a 24 hour period.    Mother has [facility], encouraged her to call them for additional lactation support after discharge to home.    Encouraged mother to call for lactation support as needed.

## 2020-12-10 NOTE — OP REPORT
DATE OF SERVICE:  2020     PREOPERATIVE DIAGNOSES:    1.  Intrauterine pregnancy 40 and 1.  2.  Sandra-Danlos syndrome type hypermobility.  3.  Advanced maternal age.  4.  Polyhydramnios.  5.  Hypothyroid.  6.  Bipolar.  7.  Failed cervical ripening.     POSTOPERATIVE DIAGNOSES:  1.  Intrauterine pregnancy 40 and 1.  2.  Sandra-Danlos syndrome type hypermobility.  3.  Advanced maternal age.  4.  Polyhydramnios.  5.  Hypothyroid.  6.  Bipolar.  7.  Failed cervical ripening.     PROCEDURE PERFORMED:  Low transverse  section via Pfannenstiel skin   incision.     SURGEON:  Malinda Murdock MD     ASSISTANT:  Jose Antonio Lara MD     ANESTHESIOLOGIST:  Abhishek Ramon MD     ANESTHESIA:  By spinal anesthesia.     COMPLICATIONS:  None.     ESTIMATED BLOOD LOSS:  600 mL.     SPECIMENS:  None.     INTRAOPERATIVE FINDINGS:  Included a female infant, copious amounts of clear   amniotic fluid, vertex position, Apgars 8, 8 at 1 and 5 minutes respectively.    Weight is pending at this time.  Normal-appearing uterus, tubes, and ovaries.     DESCRIPTION OF PROCEDURE:  After proper consent was obtained, the patient was   taken to the operating room where spinal anesthesia was obtained by Dr. Ramon without difficulty.  She was placed in dorsal supine position with a   leftward tilt.  Fetal heart tones were monitored and reassuring.  A sterile   Holt catheter was placed and she was then prepped in normal sterile fashion.    After the appropriate time interval, she was then draped in normal sterile   fashion and a Pfannenstiel skin incision was made with a scalpel and carried   down to the underlying fascia with electrocautery.  Fascia was nicked sharply   in the midline.  Fascial incision extended laterally with Ramirez scissors.  The   inferior aspect of the fascial incision was grasped with Kochers x2, elevated,   and rectus muscles dissected off sharply.  Attention was turned to the   superior aspect of  the fascial incision, in similar fashion grasped, elevated   and rectus muscles dissected off with a combination of electrocautery and   scissors.  The rectus muscles were then  in the midline bluntly.    Peritoneum was identified, grasped, and entered sharply.  Then, the peritoneal   incision was extended inferiorly and superiorly with good visualization of   bladder.  Bladder blade was inserted and the vesicouterine peritoneum was   tented up and the bladder flap was created using Metzenbaum scissors and the   bladder was dissected off the lower uterine segment.  Bladder blade was   reinserted to effectively protect the bladder from the operative field and the   lower uterine segment incised in transverse U-shaped fashion with a scalpel.    The uterine incision was extended manually.  Membranes were ruptured and   clear amniotic fluid is noted and the infant's head is delivered   atraumatically, followed by the remainder of the infant via normal maneuvers.    Infant is pink and vigorous on the abdomen and therefore, the cord is clamped   30 seconds after delivery, doubly clamped, transected, and the infant was   handed off to the waiting nursing team.  The placenta was removed by gentle   traction on the cord and extra uterine massage.  The uterus was exteriorized   and cleared of all clots and debris with a dry laparotomy sponge.  The   anterior fundal region feels very gritty, although on visualization, it   appears to be decidual.  A large banjo curette was used to curette the   anterior wall gently and no further tissue or placental tissue is noted.  She   is somewhat boggy initially, but firms up with continued Pitocin and fundal   and bimanual massage.  The lower uterine segment was then closed with 1   chromic in a running locked fashion.  Second layer of same suture was used to   effectively close the uterus in a 2-layer fashion.  Bladder was examined and   seen to be below the level of the  incision and repair.  A Holt catheter was   checked with clear urine in the Holt tubing.  Both ____ ovaries were examined   and found to be normal and the uterus was returned to the abdomen.  The   gutters were cleared of all clots and debris.  Lower uterine segment again   reinspected and found to be hemostatic.  Bladder was reinspected and found to   be below the level of the incision repair.  At this time, peritoneum was   grasped with Rosibel clamps x3 and closed with running 3-0 Vicryl suture.  Two   horizontal mattress sutures were placed to reapproximate the rectus muscles in   the midline.  Copious irrigation was used.  Rectus muscle, bellies were   examined and found to be hemostatic, intact, and well-approximated in the   midline and the fascia was closed with 0 Vicryl in a running fashion.  Manual   palpation confirmed ____ adequate closure of the fascial layer.  Copious   irrigation again used.  Hemostasis noted.  Subcutaneous fatty layer   reapproximated with 3-0 Vicryl suture and the skin was closed with   subcuticular 4-0 Vicryl on a Anatoliy needle and a Mepilex dressing was applied.    Sponge, lap, needle, and instrument counts were correct x3 and the patient is   taken to the recovery room awake and in stable condition.  Infant was   transferred to  nursery care, requiring oxygen to keep her saturations   up.        ______________________________________________  MD LEONCIO Golden/SUB    DD:  12/10/2020 09:23  DT:  12/10/2020 10:47    Job#:  [id number]

## 2020-12-10 NOTE — H&P
DATE OF SERVICE:  2020     ADDENDUM     HISTORY OF PRESENT ILLNESS:  This is a 35-year-old  1, para 0, at 40   and 1 who was admitted yesterday for induction of labor secondary to advanced   maternal age and Sandra-Danlos syndrome.  Cervix was closed and has been   closed for weeks despite complaints of frequent regular contractions.  She has   received 3 doses of Cytotec.  Her cervix remains very tightly closed, I   cannot open the cervix in any way.  The patient is frustrated and she desires    delivery.  She had actually been desiring  delivery prior.    She feels like her cervix will not dilate.  She is concerned about a hip   positioning with her Sandra-Danlos and she was requesting  section   earlier today, I have convinced her to try one more dose of Cytotec, but at   this time after the third dose, she is still closed and the patient is   refusing further induction and desires  section and I have agreed to   this.  She has had a reactive fetal heart tracing.  She has regular uterine   contractions every 2-3 minutes, palpate mild, felt as mild.  I have discussed   with her that certainly there are many options including sending her home and   reattempting cervical ripening and induction at a later date in a few days.    We also discussed continuing Cytotec for cervical ripening versus primary    delivery and at this time, again, she is requesting primary    delivery.  I have discussed the risks, benefits, and alternatives of    section in detail with her including the risks of anesthesia.  There is some   question about local lidocaine use and inefficiency in Sandra-Danlos, but data   seems to support that spinal anesthesia works appropriately.  Risks of injury   to other intra-abdominal and pelvic organs including but not limited to   bowel, bladder, blood vessels, ureters or nerves, requiring further surgical   repair; delayed diagnosis,  possible permanent organ damage, risk of hemorrhage   requiring transfusions.  She verbally consents to blood transfusion emergency   situation, risk of bowel injury, requiring repair up to and including bowel   resection, permanent colostomy, risk of bladder and ureter injuries, requiring   repair up to and including permanent catheterization and reimplantation, risk   of nerve damage from intraoperative damage, intraoperative placement causing   chronic pain syndromes and loss of limb mobility.  She understands she will   have an abdominal scar.  Postoperative complications including wound   separation, wound dehiscence, wound infection, thromboembolic events, and   pneumonia were all discussed.  She understands she is at increased risk of   keloid scar formation with her Sandra-Danlos.  She also understands the tissue   hypersensitivity and fragility of Sandra-Danlos causing hematomas, seromas   and wound problems as well as intraoperative bleeding.  She again is very   concerned about hip injury with her hypermobile Sandra-Danlos, but has not had   any joint dislocation in more than 30 years.  At this time, she would like to   proceed with primary  delivery.  Please see admission history and   physical for remainder of history.     ASSESSMENT:  1.  Intrauterine pregnancy 40 and 1.  2.  Sandra-Danlos syndrome type hypermobility.  3.  Advanced maternal age.  4.  Polyhydramnios.  5.  Hypothyroidism.  6.  Bipolar disorder.  7.  Group B strep culture negative.  8.  Failed cervical ripening.     PLAN:  The patient was prepared and consented for primary  delivery at   this time.        ______________________________________________  MD SOCO GoldenW/ECHO/ARIANA    DD:  2020 16:55  DT:  2020 17:20    Job#:  [id number]

## 2020-12-10 NOTE — PROGRESS NOTES
1930- Patient arrived on [facility] accompanied by FOB and mother . Report received and bands verified with PHILIPP Patel. Patient and FOB oriented to unit, room, call light, emergency pull cord, dura checks, and plan of care. SCDs on and activated, IV medication running at 125 ml/hr. Fundus firm at umbilicus with lochia light. Will continue to monitor patients pain and bleeding per protocol.    2100- Dura check complete. Medication administration discussed with patient. No additional questions at this time.     0230- Infant brought to patients room for breastfeeding. Patient knew positioning and was able to latch infant with minimal assistance. Infant latched for 28 minutes with football hold on right side. Pump supplies shown to patient and scheduled pumping times. Patient shown how to set up pump and clean as needed. No additional questions at this time. Infant returned to nursery.     0315-Report given to PHILIPP Jackson. POC discussed.

## 2020-12-10 NOTE — PROGRESS NOTES
POD # 1 S/P PLTCS (Failed induction)    Doing well.  Pain controlled.  Voiding, ambulate X 1, breast feeding.  Tolerating clear diet.  no Flatus.  Denies heavy bleeding.    Vitals:    12/10/20 0600   BP: 105/59   Pulse: 67   Resp: 16   Temp: 36.2 °C (97.2 °F)   SpO2: 97%         AAO, NAD  Abd:  S/NT mild distention; fundus firm; incision dressing c/d/i, no e/e  Ext:  Calves NT, no edema  Grover in place    Recent Labs     12/08/20  1800 12/10/20  0549   HEMOGLOBIN 12.2 9.4*   HEMATOCRIT 37.2 29.0*   MCV 91.9 93.5   MCH 30.1 30.3   PLATELETCT 114* 117*       Continue post-op and postpartum care.  Increase ambulation today.  Slow advance of diet secondary to distention and no flatus.   Remove grover this am.   Restart Geodon 80mg for bipolar disorder    awestfall

## 2020-12-10 NOTE — ANESTHESIA POSTPROCEDURE EVALUATION
Patient: Kristen Luna    Procedure Summary     Date: 20 Room / Location: [facility] OR 02 / LABOR AND DELIVERY    Anesthesia Start:  Anesthesia Stop:     Procedure:  SECTION, PRIMARY (N/A Abdomen) Diagnosis: (same, neal)    Surgeons: Malinda Murdock M.D. Responsible Provider: Abhishek Ramon M.D.    Anesthesia Type: spinal ASA Status: 2 - Emergent          Final Anesthesia Type: spinal  Last vitals  BP   Blood Pressure: 110/61    Temp   36.3 °C (97.4 °F)    Pulse   Pulse: 87   Resp   18    SpO2   98 %      Anesthesia Post Evaluation    Patient location during evaluation: PACU  Patient participation: complete - patient participated  Level of consciousness: awake and alert    Airway patency: patent  Anesthetic complications: no  Cardiovascular status: hemodynamically stable  Respiratory status: acceptable  Hydration status: euvolemic    PONV: none           Nurse Pain Score: 5 (NPRS)

## 2020-12-10 NOTE — PROGRESS NOTES
1900 Report received from HARIS Roth RN  1925 Pt transferred to  in stable condition via guredwina. Report given to Jennyfer SEGAL. Infant in NBN.

## 2020-12-10 NOTE — DISCHARGE PLANNING
Discharge Planning Assessment Post Partum    Reason for Referral: History of anxiety, bipolar, and THC  Address: [address] [place] [address] [address] [place] [address]  Phone: [phone]  Type of Living Situation: living with FOB  Mom Diagnosis: Pregnancy,   Baby Diagnosis: -40.1 weeks  Primary Language: English    Name of Baby: Sharon Eugene (: 20)  Father of the Baby: Simone Eugene (: 3/3/82)  Involved in baby’s care? Yes  Contact Information: [phone]    Prenatal Care: Yes  Mom's PCP: Dr. Tinsley   PCP for new [facility]: [facility]    Support System: FOB and MOB's mother-Jessica  Coping/Bonding between mother & baby: Yes  Source of Feeding: breast feeding  Supplies for Infant: prepared for infant; denies any needs    Mom's Insurance: Medicaid FFS  Baby Covered on Insurance:Yes  Mother Employed/School: [employer]-MOB stated she hasn't worked since August but plans to return to work after maternity leave  Other children in the home/names & ages: No, first baby    Financial Hardship/Income: denies   Mom's Mental status: alert and oriented  Services used prior to admit: Medicaid, WIC, and the FOB is receiving food stamps    CPS History: No  Psychiatric History: history of bipolar and anxiety.  MOB states she is on medication-Geodon and Gabapentin and plans to continue taking the medication.  She also has a therapist and she stated she was able to speak with her today over the phone.  MOB stated she speaks with her therapist twice a week.  Domestic Violence History: No  Drug/ETOH History: history of THC use.  Denies use during pregnancy and infant's UDS is negative.    Resources Provided: children and family resource list provided  Referrals Made: diaper bank referral provided     Clearance for Discharge: Infant is cleared to discharge home with mother

## 2020-12-10 NOTE — PROGRESS NOTES
0845 Assessment completed, fundus firm, lochia light. ABD incision with mepilex dressing intact, scant old drainage present. POC reviewed, verbalized understanding. Denies pain at this time, will call if pain med intervention needed.   1750 Patient ambulated to restroom with minimal assist. Voided minimal amount. Indicated feeling bladder empty. Bladder scan ordered.

## 2020-12-11 PROCEDURE — A9270 NON-COVERED ITEM OR SERVICE: HCPCS | Performed by: OBSTETRICS & GYNECOLOGY

## 2020-12-11 PROCEDURE — 700102 HCHG RX REV CODE 250 W/ 637 OVERRIDE(OP): Performed by: OBSTETRICS & GYNECOLOGY

## 2020-12-11 PROCEDURE — 770002 HCHG ROOM/CARE - OB PRIVATE (112)

## 2020-12-11 RX ORDER — IBUPROFEN 600 MG/1
600 TABLET ORAL EVERY 6 HOURS PRN
Status: DISCONTINUED | OUTPATIENT
Start: 2020-12-11 | End: 2020-12-12 | Stop reason: HOSPADM

## 2020-12-11 RX ORDER — ONDANSETRON 4 MG/1
4 TABLET, ORALLY DISINTEGRATING ORAL EVERY 6 HOURS PRN
Status: DISCONTINUED | OUTPATIENT
Start: 2020-12-11 | End: 2020-12-12 | Stop reason: HOSPADM

## 2020-12-11 RX ORDER — ONDANSETRON 2 MG/ML
4 INJECTION INTRAMUSCULAR; INTRAVENOUS EVERY 6 HOURS PRN
Status: DISCONTINUED | OUTPATIENT
Start: 2020-12-11 | End: 2020-12-12 | Stop reason: HOSPADM

## 2020-12-11 RX ORDER — MORPHINE SULFATE 10 MG/ML
4 INJECTION, SOLUTION INTRAMUSCULAR; INTRAVENOUS
Status: DISCONTINUED | OUTPATIENT
Start: 2020-12-11 | End: 2020-12-12 | Stop reason: HOSPADM

## 2020-12-11 RX ORDER — OXYCODONE HYDROCHLORIDE 10 MG/1
10 TABLET ORAL EVERY 4 HOURS PRN
Status: DISCONTINUED | OUTPATIENT
Start: 2020-12-11 | End: 2020-12-12 | Stop reason: HOSPADM

## 2020-12-11 RX ORDER — ACETAMINOPHEN 325 MG/1
650 TABLET ORAL EVERY 4 HOURS PRN
Status: DISCONTINUED | OUTPATIENT
Start: 2020-12-11 | End: 2020-12-12 | Stop reason: HOSPADM

## 2020-12-11 RX ORDER — OXYCODONE HYDROCHLORIDE 5 MG/1
5 TABLET ORAL EVERY 4 HOURS PRN
Status: DISCONTINUED | OUTPATIENT
Start: 2020-12-11 | End: 2020-12-12 | Stop reason: HOSPADM

## 2020-12-11 RX ADMIN — ACETAMINOPHEN 650 MG: 325 TABLET, FILM COATED ORAL at 00:19

## 2020-12-11 RX ADMIN — SIMETHICONE 80 MG: 80 TABLET, CHEWABLE ORAL at 08:13

## 2020-12-11 RX ADMIN — IBUPROFEN 600 MG: 600 TABLET, FILM COATED ORAL at 08:13

## 2020-12-11 RX ADMIN — IBUPROFEN 600 MG: 600 TABLET, FILM COATED ORAL at 18:32

## 2020-12-11 RX ADMIN — OXYCODONE HYDROCHLORIDE 10 MG: 10 TABLET ORAL at 14:05

## 2020-12-11 RX ADMIN — ACETAMINOPHEN 650 MG: 325 TABLET, FILM COATED ORAL at 23:47

## 2020-12-11 RX ADMIN — OXYCODONE HYDROCHLORIDE 5 MG: 5 TABLET ORAL at 08:13

## 2020-12-11 RX ADMIN — PRENATAL WITH FERROUS FUM AND FOLIC ACID 1 TABLET: 3080; 920; 120; 400; 22; 1.84; 3; 20; 10; 1; 12; 200; 27; 25; 2 TABLET ORAL at 08:13

## 2020-12-11 RX ADMIN — IBUPROFEN 600 MG: 600 TABLET, FILM COATED ORAL at 00:20

## 2020-12-11 RX ADMIN — GABAPENTIN 300 MG: 300 CAPSULE ORAL at 18:32

## 2020-12-11 RX ADMIN — ACETAMINOPHEN 650 MG: 325 TABLET, FILM COATED ORAL at 14:05

## 2020-12-11 RX ADMIN — DOCUSATE SODIUM 100 MG: 100 CAPSULE ORAL at 08:13

## 2020-12-11 RX ADMIN — DOCUSATE SODIUM 100 MG: 100 CAPSULE ORAL at 23:47

## 2020-12-11 RX ADMIN — ZIPRASIDONE HYDROCHLORIDE 80 MG: 80 CAPSULE ORAL at 06:28

## 2020-12-11 NOTE — PROGRESS NOTES
"1900 - Received report from PHILIPP Causey. Assumed care of pt. Plan of care discussed with patient at bedside. Ambulated patient to restroom because patient states she needed to void. Patient tolerated ambulating well and just feels \"pressure\" on incision. Output 250cc. Encouraged patient to keep voiding into hat in toilet.    2045- Assessment complete. Fundus firm and palpable, lochia light rubra. Pain management and interventions discussed with pt. Pt. Declines interventions for pain at this time and would just like to rest. FOB at bedside and asks how he can help with baby and mom. RN encouraged FOB to help with getting mom up to restroom if she needs assistance, hold, comfort, and change infant diapers, and to help mom with burping infant in between feeds so that mom can get some rest. FOB states to RN that he finally held his baby for the first time about an hour ago and put baby back in the bassinet by himself. Encouraged FOB to hold baby when he would like to and to call if he had any questions. Discussed safe sleeping practices if he is holding baby while in chair at the bedside and to not fall asleep with baby in arms. FOB verbalized understanding. FOB states he is comfortable with newborns and helped his brother with his kids and changing diapers. Discussed what to do if infant will not stop crying (feeding cues, diaper changing, and holding infant for comfort) and FOB verbalized understanding and states that he will do his best to help. Mom states she has latched infant by herself at the breast the last few times and it was successful and did not hurt. Educated mom to set alarms for feeding times so the she can rest in between; she verbalized understanding. All questions and concerns discussed at this time. No further needs. Will continue to monitor.   "

## 2020-12-11 NOTE — PROGRESS NOTES
POD # 2 S/P PLTCS    Doing well.  Pain controlled.  Voiding, ambulating, breast feeding.  Tolerating regular diet.  + Flatus.  Denies heavy bleeding. Feeling much better than yesterday.     AF, VS wnl    AAO, NAD  Abd:  S/NT/ND; fundus firm; dressing c/d/i  Ext:  Calves NT, no edema    Recent Labs     12/08/20  1800 12/10/20  0549   HEMOGLOBIN 12.2 9.4*   HEMATOCRIT 37.2 29.0*   MCV 91.9 93.5   MCH 30.1 30.3   PLATELETCT 114* 117*   Rh positive  Immune    Continue post-op and postpartum care.  Anticipate D/C home 1-2 days with baby    awestfall

## 2020-12-11 NOTE — CARE PLAN
Problem: Altered physiologic condition related to postoperative  delivery  Goal: Patient physiologically stable as evidenced by normal lochia, palpable uterine involution and vital signs within normal limits  Outcome: PROGRESSING AS EXPECTED  Note: VSS and within defined parameters. Fundus palpable and firm, lochia light rubra. Will continue to monitor.       Problem: Potential knowledge deficit related to lack of understanding of self and  care  Goal: Patient will verbalize understanding of self and infant care  Outcome: PROGRESSING AS EXPECTED  Note: Encouraged patient to ambulate around room and FOB to assist when needed. Discussed safe sleeping practices, feeding frequencies, and when to change infant diapers. FOB verbalized understanding. Mom able to set alarms on phone for infant feeding times. Parents call with needs.

## 2020-12-11 NOTE — LACTATION NOTE
"This note was copied from a baby's chart.  @0900 LC met with MOB for follow-up visit, MOB states baby latches more easily to her right breast, she states \"baby doesn't like my left breast, LC provided support and encouragement, LC assured MOB that baby doesn't dislike that breast, extensive breastfeeding education provided, educated on normal  behaviors and sleep-wake cycle, educated on cluster feeding, educated on expected feeding frequency and duration, educated on expected urine and stool output    MOB states she takes Geodon and Gabapentin, per [facility] both medications are an L2 and considered safe for transfer to baby via mother's breast milk    MOB states the doctor may put her on Zyprexa, per [facility] it is an L2 and considered likely safe for transfer to baby via breast milk    MOB agreed to allow LC to assist with latch attempt, MOB allowed LC to remove baby's blankets and clothing, baby placed [id number] with MOB, LC provided education on and assistance with proper positioning for a deep latch, MOB was able to easily hand express colostrum, latch attempted on the left breast using a football hold, baby made no feeding cues at that time and no latch was achieved, attempted to latch baby on the right breast using a football hold, latch was easily achieved and a nutritive suck noted however baby had hiccups, baby would suck a few times and then stop while hiccupping which appeared to upset MOB, MOB did not want to continue at that time so baby left [id number] on her chest, baby began rooting while [id number] and MOB agreed to allow LC to assist her to latch baby using a cross-cradle hold, baby made no latch attempts on her left breast, MOB then attempted to latch baby on her right breast using a cross-cradle hold, baby was rooting at that time however MOB thought baby was cold and she declined to continue attempting to latch baby, MOB breakfast tray " arrived so MOB requested LC assist her to dress and swaddle baby, baby then placed back in bassinet so MOB could eat breakfast, baby was rooting when LC was preparing to leave the room, MOB declined to attempt to latch so LC encouraged MOB to attempt to latch baby after she finishes eating breakfast    Encouraged to call for assistance as needed

## 2020-12-11 NOTE — PROGRESS NOTES
0800 Assessment completed, fundus firm, lochia light. ABD incision with mepilex dressing intact, scant old drainage present. POC reviewed, verbalized understanding. Pain controlled with PRN pain meds, will call when pain med intervention needed.

## 2020-12-11 NOTE — LACTATION NOTE
@1215 LC met with MOB for follow-up visit after receiving request from RN, RN's Marium and Janine were present in the room when LC arrived, RN had set MOB up with breast pump because they had been unable to latch baby, MOB was sitting up in bed using pump when LC arrived, she denies pain while pumping, MOB appeared very sleepy while pumping and was having extreme difficulty holding flanges to her breasts to the point that LC was afraid she would cause damage to her breast tissue, MOB appeared very upset that baby was not latching, extensive education provided on normal  behaviors and normalcy of breastfeeding difficulties, support and encouragement provided, decision made to begin supplementing with DBM, RN will bring bottle of DBM back for baby    Plan:  Q 3 hours attempt to breastfeed  For no/suboptimal feeding pump for 15 minutes and supplement per guidelines    Encouraged to call for assistance as needed

## 2020-12-11 NOTE — CARE PLAN
Problem: Potential for postpartum infection related to surgical incision, compromised uterine condition, urinary tract or respiratory compromise  Goal: Patient will be afebrile and free from signs and symptoms of infection  Outcome: PROGRESSING AS EXPECTED  Note: VSS. No signs or symptoms of infection noted     Problem: Alteration in comfort related to surgical incision and/or after birth pains  Goal: Patient is able to ambulate, care for self and infant with acceptable pain level  Outcome: PROGRESSING AS EXPECTED  Note: Patient ambulating, caring for self and infant

## 2020-12-12 VITALS
HEART RATE: 93 BPM | WEIGHT: 172 LBS | OXYGEN SATURATION: 96 % | HEIGHT: 63 IN | DIASTOLIC BLOOD PRESSURE: 70 MMHG | BODY MASS INDEX: 30.48 KG/M2 | SYSTOLIC BLOOD PRESSURE: 121 MMHG | TEMPERATURE: 98.3 F | RESPIRATION RATE: 18 BRPM

## 2020-12-12 PROBLEM — O61.9 FAILED INDUCTION OF LABOR: Status: ACTIVE | Noted: 2020-12-12

## 2020-12-12 PROCEDURE — A9270 NON-COVERED ITEM OR SERVICE: HCPCS | Performed by: OBSTETRICS & GYNECOLOGY

## 2020-12-12 PROCEDURE — 700102 HCHG RX REV CODE 250 W/ 637 OVERRIDE(OP): Performed by: OBSTETRICS & GYNECOLOGY

## 2020-12-12 RX ORDER — IBUPROFEN 800 MG/1
800 TABLET ORAL EVERY 8 HOURS PRN
Qty: 30 TAB | Refills: 0 | Status: SHIPPED | OUTPATIENT
Start: 2020-12-12 | End: 2021-10-04

## 2020-12-12 RX ORDER — OXYCODONE HYDROCHLORIDE AND ACETAMINOPHEN 5; 325 MG/1; MG/1
1 TABLET ORAL EVERY 6 HOURS PRN
Qty: 28 TAB | Refills: 0 | Status: SHIPPED | OUTPATIENT
Start: 2020-12-12 | End: 2020-12-19

## 2020-12-12 RX ORDER — FERROUS SULFATE 325(65) MG
325 TABLET ORAL DAILY
Qty: 30 TAB | Refills: 0 | Status: SHIPPED | OUTPATIENT
Start: 2020-12-12 | End: 2022-04-25

## 2020-12-12 RX ORDER — DOCUSATE SODIUM 100 MG/1
100 CAPSULE, LIQUID FILLED ORAL 2 TIMES DAILY
Qty: 60 CAP | Refills: 0 | Status: SHIPPED | OUTPATIENT
Start: 2020-12-12 | End: 2022-04-25

## 2020-12-12 RX ADMIN — IBUPROFEN 600 MG: 600 TABLET, FILM COATED ORAL at 01:23

## 2020-12-12 RX ADMIN — PRENATAL WITH FERROUS FUM AND FOLIC ACID 1 TABLET: 3080; 920; 120; 400; 22; 1.84; 3; 20; 10; 1; 12; 200; 27; 25; 2 TABLET ORAL at 08:00

## 2020-12-12 RX ADMIN — OXYCODONE HYDROCHLORIDE 10 MG: 10 TABLET ORAL at 10:34

## 2020-12-12 RX ADMIN — IBUPROFEN 600 MG: 600 TABLET, FILM COATED ORAL at 10:34

## 2020-12-12 RX ADMIN — ZIPRASIDONE HYDROCHLORIDE 80 MG: 80 CAPSULE ORAL at 06:17

## 2020-12-12 RX ADMIN — OXYCODONE HYDROCHLORIDE 10 MG: 10 TABLET ORAL at 01:23

## 2020-12-12 ASSESSMENT — EDINBURGH POSTNATAL DEPRESSION SCALE (EPDS)
I HAVE BEEN SO UNHAPPY THAT I HAVE HAD DIFFICULTY SLEEPING: NOT AT ALL
I HAVE BLAMED MYSELF UNNECESSARILY WHEN THINGS WENT WRONG: YES, MOST OF THE TIME
THINGS HAVE BEEN GETTING ON TOP OF ME: YES, SOMETIMES I HAVEN'T BEEN COPING AS WELL AS USUAL
I HAVE FELT SAD OR MISERABLE: YES, MOST OF THE TIME
THE THOUGHT OF HARMING MYSELF HAS OCCURRED TO ME: NEVER
I HAVE BEEN SO UNHAPPY THAT I HAVE BEEN CRYING: ONLY OCCASIONALLY
I HAVE BEEN ANXIOUS OR WORRIED FOR NO GOOD REASON: YES, SOMETIMES
I HAVE LOOKED FORWARD WITH ENJOYMENT TO THINGS: RATHER LESS THAN I USED TO
I HAVE FELT SCARED OR PANICKY FOR NO GOOD REASON: YES, SOMETIMES
I HAVE BEEN ABLE TO LAUGH AND SEE THE FUNNY SIDE OF THINGS: AS MUCH AS I ALWAYS COULD

## 2020-12-12 NOTE — PROGRESS NOTES
Dr. Abreu has been notified in regards to Patient's Depression screen score of 14. Social was placed in again and is pending.

## 2020-12-12 NOTE — PROGRESS NOTES
Patient called for RN to come in the room, patient requested for the pain medication and was uncomfortable. RN gave Motrin 600mg  and Roxicodone 10mg per MD orders at 1034. Patient calls after 45 minutes and was in tears, complaining for pain on her right side of the incision. RN checked her incision which looked the same and had no changes since this morning assessment (documented in flowsheets). RN asked patient to void and empty her bladder as patient last went around 0800 this morning. Patient wouldn't walk with the assistance provided as she was in so much pain. RN called for more help to assist patient to the bathroom. Patient finally went to the bathroom by her self and reported a relief after voiding.     Dr. Abreu was notified with this incidence as well as the incidence reported by the CNA this morning with infant.

## 2020-12-12 NOTE — CARE PLAN
Problem: Altered physiologic condition related to postoperative  delivery  Goal: Patient physiologically stable as evidenced by normal lochia, palpable uterine involution and vital signs within normal limits  Outcome: PROGRESSING AS EXPECTED  Note: VSS and within defined parameters. Fundus palpable and firm, lochia light rubra. Will continue to monitor.       Problem: Potential knowledge deficit related to lack of understanding of self and  care  Goal: Patient will demonstrate ability to care for self and infant  Outcome: PROGRESSING AS EXPECTED  Note: Patient is setting alarms for infant feeding times and working on breastfeeding and latching independently. Patient also practicing swaddling infant and burping infant after feeds and ambulating without difficulty to restroom by self. Will continue to monitor.

## 2020-12-12 NOTE — PROGRESS NOTES
I went into PT room around 0955 to round. Upon entering the room the mother seemed sleepy and the FOB was sitting in the corner of the room. I collected the tray and went to walk out of the room but I heard sounds like the baby was choking. I went back into the room, both parents seemed unfazed but the baby was actively choking. Both FOB and MOB stayed sitting down while I attempted to assist the baby. They did not know where the bulb syringe was, once I found it I suctioned the babies mouth and the  recovered. However, I saw the interaction with the baby as inappropriate and became worried if I was not around if the MOB/FOB would have not gotten up to assist the baby. Devi notified of the interaction. She will be contacting Social work and informing the MD.

## 2020-12-12 NOTE — DISCHARGE PLANNING
Ongoing: FOB and MOB were asked again if they would like a referral to [facility] and they agreed. Referral faxed.

## 2020-12-12 NOTE — PROGRESS NOTES
Patient D/C in stable condition per MD order. Discharge education provided, patient receptive. All questions answered.    Education provided to the patient, her significant and the mother of the patient. They all verbalized understanding. All questions answered.     Patient and father mentioned to get all the resources & help out there. RN mentioned about [facility] and they agreed for the resource.  notified.

## 2020-12-12 NOTE — PROGRESS NOTES
FOB - father of the baby brought the car seat to the room, parents had no idea how to place infant in the car seat and they asked for help. RN asked about the aquiles and mother of baby replied its in the car. RN educated on how to place infant correctly in the car seat and how to strap the baby as well as on how to take infant out of the car seat safely. RN recommended parents to read the aquiles for the car seat and get the car seat base checked which was installed by the mother of the patient to ensure infant's safety. Parents verbalized understanding and will be getting their car seat installation checked.

## 2020-12-12 NOTE — DISCHARGE INSTRUCTIONS
POSTPARTUM DISCHARGE INSTRUCTIONS FOR MOM    YOB: 1985   Age: 35 y.o.               Admit Date: 2020     Discharge Date: 2020  Attending Doctor:  Malinda Murdock M.D.                  Allergies:  Codeine, Hydrocodone, Naprosyn [naproxen], Zoloft, and Fentanyl    Discharged to home by car. Discharged via wheelchair, hospital escort: Yes.  Special equipment needed: Not Applicable  Belongings with: Personal  Be sure to schedule a follow-up appointment with your primary care doctor or any specialists as instructed.     Discharge Plan:   Diet Plan: Discussed  Activity Level: Discussed  Confirmed Follow up Appointment: Patient to Call and Schedule Appointment  Confirmed Symptoms Management: Discussed  Medication Reconciliation Updated: Yes  Influenza Vaccine Indication: Patient Refuses    REASONS TO CALL YOUR OBSTETRICIAN:  1.   Persistent fever or shaking chills (Temperature higher than 100.4)  2.   Heavy bleeding (soaking more than 1 pad per hour); Passing clots  3.   Foul odor from vagina  4.   Mastitis (Breast infection; breast pain, chills, fever, redness)  5.   Urinary pain, burning or frequency  6.   Episiotomy infection  7.   Abdominal incision infection  8.   Severe depression longer than 24 hours    HAND WASHING  · Prior to handling the baby.  · Before breastfeeding or bottle feeding baby.  · After using the bathroom or changing the baby's diaper.    WOUND CARE  Ask your physician for additional care instructions.  In general:    ·  Incision:      · Keep clean and dry.    · Do NOT lift anything heavier than your baby for up to 6 weeks.    · There should not be any opening or pus.      VAGINAL CARE  · Nothing inside vagina for 6 weeks: no sexual intercourse, tampons or douching.  · Bleeding may continue for 2-4 weeks.  Amount may vary.    · Call your physician for heavy bleeding which means soaking more than 1 pad per hour    BIRTH CONTROL  · It is possible to become pregnant at  "any time after delivery and while breastfeeding.  · Plan to discuss a method of birth control with your physician at your follow up visit. visit.    DIET AND ELIMINATION  · Eating more fiber (bran cereal, fruits, and vegetables) and drinking plenty of fluids will help to avoid constipation.  · Urinary frequency after childbirth is normal.    POSTPARTUM BLUES  During the first few days after birth, you may experience a sense of the \"blues\" which may include impatience, irritability or even crying.  These feeling come and go quickly.  However, as many as 1 in 10 women experience emotional symptoms known as postpartum depression.    Postpartum depression:  May start as early as the second or third day after delivery or take several weeks or months to develop.  Symptoms of \"blues\" are present, but are more intense:  Crying spells; loss of appetite; feelings of hopelessness or loss of control; fear of touching the baby; over concern or no concern at all about the baby; little or no concern about your own appearance/caring for yourself; and/or inability to sleep or excessive sleeping.  Contact your physician if you are experiencing any of these symptoms.    Crisis Hotline:  · [place] Crisis Hotline:  0-621-JRPFOPB  Or 1-253.161.6593  · [place] Crisis Hotline:  1-934.531.5324  Or 986-404-7795    PREVENTING SHAKEN BABY:  If you are angry or stressed, PUT THE BABY IN THE CRIB, step away, take some deep breaths, and wait until you are calm to care for the baby.  DO NOT SHAKE THE BABY.  You are not alone, call a supporter for help.    · Crisis [facility] Center 24/7 crisis line 248-914-9626 or 1-535.595.6000  · You can also text them, text \"ANSWER\" to 864515    QUIT SMOKING/TOBACCO USE:  I understand the use of any tobacco products increases my chance of suffering from future heart disease and could cause other illnesses which may shorten my life. Quitting the use of tobacco products is the single most important thing I can do to " improve my health. For further information on smoking / tobacco cessation call a Toll Free Quit Line at 1-631.382.4852 (*National [employer] [place]) or 1-597.298.1422 (American Lung Association) or you can access the web based program at www.lungusa.org.    · [place] Tobacco Users Help Line:  (363) 653-7690       Toll Free: 1-870.111.3168  · Quit Tobacco Program [facility] (379)012-6986    DEPRESSION / SUICIDE RISK:  As you are discharged from this [facility], it is important to learn how to keep safe from harming yourself.    Recognize the warning signs:  · Abrupt changes in personality, positive or negative- including increase in energy   · Giving away possessions  · Change in eating patterns- significant weight changes-  positive or negative  · Change in sleeping patterns- unable to sleep or sleeping all the time   · Unwillingness or inability to communicate  · Depression  · Unusual sadness, discouragement and loneliness  · Talk of wanting to die  · Neglect of personal appearance   · Rebelliousness- reckless behavior  · Withdrawal from people/activities they love  · Confusion- inability to concentrate     If you or a loved one observes any of these behaviors or has concerns about self-harm, here's what you can do:  · Talk about it- your feelings and reasons for harming yourself  · Remove any means that you might use to hurt yourself (examples: pills, rope, extension cords, firearm)  · Get professional help from the community (Mental Health, Substance Abuse, psychological counseling)  · Do not be alone:Call your Safe Contact- someone whom you trust who will be there for you.  · Call your local CRISIS HOTLINE 552-7520 or 437-335-6375  · Call your local Children's Mobile Crisis Response Team [place] (825) 161-5467 or www.AcuFocus  · Call the toll free National Suicide Prevention Hotlines   · National Suicide Prevention Lifeline 100-542-HRZP (0352)  · National Hope Line  [facility] [place] 800-SUICIDE (125-9854)    DISCHARGE SURVEY:  Thank you for choosing [facility].  We hope we provided you with very good care.  You may be receiving a survey in the mail.  Please fill it out.  Your opinion is valuable to us.    ADDITIONAL EDUCATIONAL MATERIALS GIVEN TO PATIENT:        My signature on this form indicates that:  1.  I have reviewed and understand the above information  2.  My questions regarding this information have been answered to my satisfaction.  3.  I have formulated a plan with my discharge nurse to obtain my prescribed medication for home.

## 2020-12-12 NOTE — DISCHARGE PLANNING
Medical Social Work    Referral: EPDS score of 14. Lactation consultant also curious if [facility]? program available for patient follow-up after discharge.     Intervention: This writer met with MOB at bedside who reports she is actively in mental health treatment. MOB declined referral for [employer]. This writer made reports to Evelyn at [facility] Human Services Agency’s (HSA) Child Protective Services (CPS) regarding nursing concerns of ability to care for baby that baby was observed choking and parents appeared not to know what to do and had to educated by RN. The reported primary caregiver for baby/FOB has not be witnessed caring  for baby or changing diapers, so no assessment of ability to care of baby has been observed with FOB . Evelyn reports that the report made will be an informational only report.     Plan: MOB and baby are cleared by , but if any concern arise please notify .

## 2020-12-12 NOTE — LACTATION NOTE
This note was copied from a baby's chart.  @0915 LC met with MOB for latch assistance, MOB states baby has been breastfeeding well on the right breast throughout the night but still will not latch to her left breast, MOB agreed to allow LC to assist her to latch baby, baby placed [id number] with MOB, LC provided education on and assistance with proper positioning for a deep latch, with minimal assistance a deep latch with widely-flanged lips achieved on her left breast using a football hold, a nutritive suck was noted, encouraged frequent stimulation to maintain infant wakefulness for feeding, after about 10 minutes baby became fussy and pulled away from the breast, LC instructed MOB to burp baby, after burping baby LC was able to assist MOB to latch baby again to the left breast with a deep latch and a nutritive suck, MOB denies any discomfort while breastfeeding at this time    LC provided supplement guidelines in case needed due to previous difficulty breastfeeding and MOB emotional distress that was noted yesterday while LC assisted with breastfeeding, MOB instructed to use breast pump Q 3 hours for 15 minutes if she feels she needs to supplement, if needing to supplement LC instructed her to call her [facility] counselor for additional assistance    MOB states she has [facility], educated on assistance available at [facility] after discharge and instructed to call her [place] counselor for breastfeeding assistance as needed after discharge    MOB states she has a Medela personal pump for home use if needed    Zoom meeting information provided    MOB denies having any additional questions or concerns for LC at this time    Encouraged to call for assistance as needed

## 2020-12-12 NOTE — PROGRESS NOTES
"1915 - Received report from PHILIPP Causey. Assumed care of pt. Plan of care discussed.    1955- Assessment complete. Fundus firm and palpable, lochia light rubra. All questions and concerns discussed at this time. No further needs. Encouraged pt. to call if needing assistance. Patient mother at bedside assisting with needs as well as FOB present in room. Will continue to monitor.     2130- Patient called RN to bedside. Infant is spitty and mom states that \"baby is turning blue and I don't like that.\" RN turned room light up all the way. Addressed what to look for when she believes that infant is blue including checking that infant gums are pink in mouth. Infant currently pink and O2 sat 98%. Mom expressed concerns that infant does not want to feed/latch. Encouraged mom to get infant naked, to skin to skin, check infants diaper and try new position. Infant sleepy and not interested. Mom able to hand express colostrum into infant lips/mouth. Told mom to swaddle infant and try again when infant begins showing feeding cues.     2200- RN called to bedside because infant is spitty and still does not want to latch. Showed mom how to bulb suction infant mouth and burp infant.    2300- MOB called RN to bedside because infant is still spitty and does not want to feed/latch. Explained to mom that it is OK that infant does not want to latch at this time and to pump and feed infant back expressed milk. Mom began crying and stated that she is frustrated and doesn't understand why her baby doesn't want to feed. Told mom to start incorporating pumping into her feeding plan and give infant and herself a break from breastfeeding when she is frustrated. Encouraged mom to relax and take some deep breaths. MOB verbalized understanding. Infant swaddled and placed in bassinet by RN.    0815- NBN CNA asking If parents would like infant to have a bath. RN checked with parents and educated them that after baths infants can sometimes be sleepy and " not want to feed. Mom verbalized understanding. Educated mom that she can decline the bath at this time and do the bath at home. Both parents would like the bath done in the hospital.

## 2021-01-28 NOTE — DISCHARGE SUMMARY
DATE OF ADMISSION:  2020   DATE OF DISCHARGE:  2020     DIAGNOSIS ON ADMISSION:  Intrauterine pregnancy at 40 weeks' gestation.     DIAGNOSIS ON DISCHARGE: Status post primary low transverse  section.     BRIEF HISTORY:  This is a patient who was admitted for induction of labor at   term with a pregnancy complicated by advanced maternal age and Sandra-Danlos   syndrome.  She was admitted; however, had failed induction.  Please see   preoperative H and P and a primary  section was performed on ,   uncomplicated procedure.  She delivered a viable female infant with Apgars of   8 and 8.  Her postoperative course was uncomplicated.  On the day of   discharge, she was postop day #3.  She is tolerating regular diet, ambulating,   voiding, passing flatus, reporting adequate pain control and is both breast   and bottle feeding.  She has been completely afebrile.  Vital signs stable and   within normal limits.  Abdomen is soft, fundus is firm.  The incision   dressing is clean, dry and intact.  Postop H and H is 9.4 and 29 with platelet   count of 117.  She has been seen by  and cleared for discharge   home with her baby and she is discharged home by my on-call partner, Dr. Rudy Abreu on  with routine postoperative instructions, prescriptions   and followup.        ______________________________  MD LEONCIO Golden/ROB/DAT    DD:  2021 17:08  DT:  2021 18:14    Job#:  [id number]

## 2021-03-25 ENCOUNTER — HOSPITAL ENCOUNTER (OUTPATIENT)
Dept: LAB | Facility: MEDICAL CENTER | Age: 36
End: 2021-03-25
Attending: HOSPITALIST
Payer: MEDICAID

## 2021-03-25 ENCOUNTER — HOSPITAL ENCOUNTER (OUTPATIENT)
Dept: LAB | Facility: MEDICAL CENTER | Age: 36
End: 2021-03-25
Attending: OBSTETRICS & GYNECOLOGY
Payer: MEDICAID

## 2021-03-25 LAB
B-HCG SERPL-ACNC: <1 MIU/ML (ref 0–5)
T4 FREE SERPL-MCNC: 0.48 NG/DL (ref 0.93–1.7)
TSH SERPL DL<=0.005 MIU/L-ACNC: 37.7 UIU/ML (ref 0.38–5.33)

## 2021-03-25 PROCEDURE — 84702 CHORIONIC GONADOTROPIN TEST: CPT

## 2021-03-25 PROCEDURE — 84443 ASSAY THYROID STIM HORMONE: CPT

## 2021-03-25 PROCEDURE — 84439 ASSAY OF FREE THYROXINE: CPT

## 2021-03-25 PROCEDURE — 36415 COLL VENOUS BLD VENIPUNCTURE: CPT

## 2021-08-18 ENCOUNTER — APPOINTMENT (OUTPATIENT)
Dept: RADIOLOGY | Facility: MEDICAL CENTER | Age: 36
End: 2021-08-18
Attending: OBSTETRICS & GYNECOLOGY
Payer: MEDICAID

## 2021-10-04 ENCOUNTER — OFFICE VISIT (OUTPATIENT)
Dept: MEDICAL GROUP | Facility: CLINIC | Age: 36
End: 2021-10-04
Payer: MEDICAID

## 2021-10-04 VITALS
WEIGHT: 135 LBS | HEIGHT: 63 IN | HEART RATE: 67 BPM | BODY MASS INDEX: 23.92 KG/M2 | DIASTOLIC BLOOD PRESSURE: 65 MMHG | RESPIRATION RATE: 12 BRPM | OXYGEN SATURATION: 97 % | SYSTOLIC BLOOD PRESSURE: 115 MMHG | TEMPERATURE: 98 F

## 2021-10-04 DIAGNOSIS — Z30.46 ENCOUNTER FOR SURVEILLANCE OF IMPLANTABLE SUBDERMAL CONTRACEPTIVE: ICD-10-CM

## 2021-10-04 PROBLEM — F31.9 BIPOLAR AFFECTIVE DISORDER (HCC): Status: ACTIVE | Noted: 2021-03-11

## 2021-10-04 PROBLEM — N91.2 AMENORRHEA: Status: ACTIVE | Noted: 2021-06-01

## 2021-10-04 PROBLEM — F32.9 DEPRESSION, MAJOR: Status: ACTIVE | Noted: 2021-01-12

## 2021-10-04 PROBLEM — Z30.09 GENERAL COUNSELING AND ADVICE FOR CONTRACEPTIVE MANAGEMENT: Status: ACTIVE | Noted: 2021-01-13

## 2021-10-04 PROBLEM — R07.89 COSTOCHONDRAL CHEST PAIN: Status: ACTIVE | Noted: 2021-04-28

## 2021-10-04 PROBLEM — R10.9 ABDOMINAL PAIN: Status: ACTIVE | Noted: 2021-06-01

## 2021-10-04 PROCEDURE — 99213 OFFICE O/P EST LOW 20 MIN: CPT | Performed by: STUDENT IN AN ORGANIZED HEALTH CARE EDUCATION/TRAINING PROGRAM

## 2021-10-04 ASSESSMENT — ENCOUNTER SYMPTOMS
EYE PAIN: 0
DEPRESSION: 0
NERVOUS/ANXIOUS: 0
NAUSEA: 0
DOUBLE VISION: 0
DIZZINESS: 0
BLURRED VISION: 0
SHORTNESS OF BREATH: 0
FEVER: 0
CHILLS: 0
VOMITING: 0
SORE THROAT: 0
COUGH: 0
DIARRHEA: 0
HEADACHES: 0

## 2021-10-04 ASSESSMENT — PAIN SCALES - GENERAL: PAINLEVEL: 5=MODERATE PAIN

## 2021-10-04 ASSESSMENT — FIBROSIS 4 INDEX: FIB4 SCORE: 1.230769230769230769

## 2021-10-04 NOTE — PROGRESS NOTES
"Subjective     Kirsten Luna is a 36 y.o. female who presents with Other (Her nexpolon is moving on her right arm )          Other  This is a new problem. The current episode started in the past 7 days. The problem occurs daily. The problem has been unchanged. Pertinent negatives include no chest pain, chills, congestion, coughing, fever, headaches, nausea, rash, sore throat or vomiting. Nothing aggravates the symptoms. She has tried nothing for the symptoms. The treatment provided no relief.   Pt with nexplanon placed in March of this year. Would like it removed at this point as having pain with it moving. Not having any peripheral numbness or tingling. No loss of strength in the hand, wrist, or arm. No bruising or swelling around the nexplanon site and can easily palpate it under the skin. No trauma to the area.     Review of Systems   Constitutional: Negative for chills and fever.   HENT: Negative for congestion and sore throat.    Eyes: Negative for blurred vision, double vision and pain.   Respiratory: Negative for cough and shortness of breath.    Cardiovascular: Negative for chest pain and leg swelling.   Gastrointestinal: Negative for diarrhea, nausea and vomiting.   Genitourinary: Negative for dysuria, frequency and urgency.   Skin: Negative for rash.   Neurological: Negative for dizziness and headaches.   Psychiatric/Behavioral: Negative for depression. The patient is not nervous/anxious.              Objective     /65 (BP Location: Left arm, Patient Position: Sitting, BP Cuff Size: Adult)   Pulse 67   Temp 36.7 °C (98 °F) (Oral)   Resp 12   Ht 1.6 m (5' 3\")   Wt 61.2 kg (135 lb)   SpO2 97%   BMI 23.91 kg/m²        PE  Gen: well appearing no distress  HEENT: normocephalic/atraumatic, PEERL  CV: RRR, no murmurs rubs or gallops  Pulm: CTAB, no wheeze or crackles  Abd: no rebound, no guarding  Ext: RUE with palpable nexplanon without bruising or swelling, no erythema, distal sensation and " strength intact in all peripheral nerve distributions  Psych: normal mood and affect, thought content normal                   Assessment & Plan      #Nexplanon moving.    -moving and painful x7 days  -slowly worsening  -no concerning signs on exam of nerve impingement  -distal neuro exam intact including strength  -no swelling to suggest clot or other local trauma    Plan  -schedule patient for removal at her request as soon as we have capability in clinic  -pt will head immediately to ER if any numbness, tingling, loss of strength, swelling or large amount of bruising occurs at the site  -would like to be on depo-provera following removal if needed    #Positive home pregnancy test  -reported that she had this 3 days ago followed by spotting  -will need to repeat test prior to further family planning methods  -if is positive then may need US to confirm normal pregnancy as would have become pregnant with nexplanon which would increase risk of ectopic etc

## 2021-10-07 ENCOUNTER — OFFICE VISIT (OUTPATIENT)
Dept: MEDICAL GROUP | Facility: CLINIC | Age: 36
End: 2021-10-07
Payer: MEDICAID

## 2021-10-07 VITALS
HEART RATE: 75 BPM | OXYGEN SATURATION: 98 % | DIASTOLIC BLOOD PRESSURE: 70 MMHG | HEIGHT: 63 IN | BODY MASS INDEX: 24.63 KG/M2 | WEIGHT: 139 LBS | SYSTOLIC BLOOD PRESSURE: 120 MMHG | TEMPERATURE: 98 F | RESPIRATION RATE: 22 BRPM

## 2021-10-07 DIAGNOSIS — Z30.46 ENCOUNTER FOR SURVEILLANCE OF NEXPLANON SUBDERMAL CONTRACEPTIVE: ICD-10-CM

## 2021-10-07 PROBLEM — O61.9 FAILED INDUCTION OF LABOR: Status: RESOLVED | Noted: 2020-12-12 | Resolved: 2021-10-07

## 2021-10-07 PROBLEM — R07.89 COSTOCHONDRAL CHEST PAIN: Status: RESOLVED | Noted: 2021-04-28 | Resolved: 2021-10-07

## 2021-10-07 PROCEDURE — 99213 OFFICE O/P EST LOW 20 MIN: CPT | Mod: GE | Performed by: FAMILY MEDICINE

## 2021-10-07 ASSESSMENT — FIBROSIS 4 INDEX: FIB4 SCORE: 1.230769230769230769

## 2021-10-07 NOTE — ASSESSMENT & PLAN NOTE
Nexplanon appropriately placed.  No surrounding erythema.  No concern for neuro involvement. patient does not want to become pregnant at this time. She is planning to follow up with Dr. Murdock for pregnancy planning. She is agreeable to keep nexplanon in place for the time being. She would like to use the depo shot if she decides to have nexplanon removed.    -Return to clinic in 1 month unless symptoms worsen  -Trial of Voltaren gel BID

## 2021-10-07 NOTE — PROGRESS NOTES
"Subjective:     CC:  Nexplanon Pain    HPI:   Kristen presents today with     Problem   Encounter for Surveillance of Nexplanon Subdermal Contraceptive    Patient c/o pain at nexplanon site. She states the pain started a few months ago. It comes and goes. associated with tingling of right hand. Pain is sharp. Patient states she has tried ice and heat packs, but does not help with pain.      Costochondral Chest Pain (Resolved)   Failed Induction of Labor (Resolved)       Current Outpatient Medications Ordered in Epic   Medication Sig Dispense Refill   • levothyroxine (SYNTHROID) 75 MCG Tab Take 100 mcg by mouth every morning on an empty stomach.     • ferrous sulfate 325 (65 Fe) MG tablet Take 1 Tab by mouth every day. (Patient not taking: Reported on 10/4/2021) 30 Tab 0   • docusate sodium (COLACE) 100 MG Cap Take 1 Cap by mouth 2 times a day. (Patient not taking: Reported on 10/4/2021) 60 Cap 0   • ondansetron (ZOFRAN ODT) 4 MG TABLET DISPERSIBLE Take 1 Tab by mouth every 8 hours as needed. (Patient not taking: Reported on 10/4/2021) 20 Tab 0   • Gabapentin (NEURONTIN PO) Take 300 mg by mouth every bedtime. (Patient not taking: Reported on 10/4/2021)       No current [facility]-ordered facility-administered medications on file.       Health Maintenance: deferred     ROS:  Gen: no fevers/chills, no changes in weight  Pulm: no sob, no cough  CV: no chest pain, no palpitations  GI: no nausea/vomiting, no diarrhea  MSk: no myalgias  Skin: no rash  Neuro: no headaches, POS numbness/tingling      Objective:     Exam:  /70   Pulse 75   Temp 36.7 °C (98 °F)   Resp (!) 22   Ht 1.6 m (5' 3\")   Wt 63 kg (139 lb)   SpO2 98%   BMI 24.62 kg/m²  Body mass index is 24.62 kg/m².    Gen: Alert and oriented, No apparent distress.  Neck: Neck is supple without lymphadenopathy.  Ext: No clubbing, cyanosis, edema. Nexplanon appropriately placed in right arm.  No surrounding erythema or streaking. nontender to palpation    Labs: " none     Assessment & Plan:     36 y.o. female with the following -     Problem List Items Addressed This Visit     Encounter for surveillance of Nexplanon subdermal contraceptive     Nexplanon appropriately placed.  No surrounding erythema.  No concern for neuro involvement. patient does not want to become pregnant at this time. She is planning to follow up with Dr. Murdock for pregnancy planning. She is agreeable to keep nexplanon in place for the time being. She would like to use the depo shot if she decides to have nexplanon removed.    -Return to clinic in 1 month unless symptoms worsen  -Trial of Voltaren gel BID                  No follow-ups on file.    Yeimi Cochran MD   PGY2    Please note that this dictation was created using voice recognition software. I have made every reasonable attempt to correct obvious errors, but I expect that there are errors of grammar and possibly content that I did not discover before finalizing the note.

## 2021-10-13 ENCOUNTER — OFFICE VISIT (OUTPATIENT)
Dept: MEDICAL GROUP | Facility: CLINIC | Age: 36
End: 2021-10-13
Payer: MEDICAID

## 2021-10-13 VITALS
RESPIRATION RATE: 12 BRPM | DIASTOLIC BLOOD PRESSURE: 58 MMHG | HEART RATE: 80 BPM | OXYGEN SATURATION: 94 % | BODY MASS INDEX: 23.92 KG/M2 | HEIGHT: 63 IN | WEIGHT: 135 LBS | TEMPERATURE: 98 F | SYSTOLIC BLOOD PRESSURE: 96 MMHG

## 2021-10-13 DIAGNOSIS — E03.8 OTHER SPECIFIED HYPOTHYROIDISM: ICD-10-CM

## 2021-10-13 DIAGNOSIS — H66.002 NON-RECURRENT ACUTE SUPPURATIVE OTITIS MEDIA OF LEFT EAR WITHOUT SPONTANEOUS RUPTURE OF TYMPANIC MEMBRANE: ICD-10-CM

## 2021-10-13 PROBLEM — H66.90 AOM (ACUTE OTITIS MEDIA): Status: ACTIVE | Noted: 2021-10-13

## 2021-10-13 PROCEDURE — 99214 OFFICE O/P EST MOD 30 MIN: CPT | Performed by: STUDENT IN AN ORGANIZED HEALTH CARE EDUCATION/TRAINING PROGRAM

## 2021-10-13 RX ORDER — AMOXICILLIN AND CLAVULANATE POTASSIUM 875; 125 MG/1; MG/1
1 TABLET, FILM COATED ORAL 2 TIMES DAILY
Qty: 14 TABLET | Refills: 0 | Status: SHIPPED
Start: 2021-10-13 | End: 2022-04-25

## 2021-10-13 ASSESSMENT — FIBROSIS 4 INDEX: FIB4 SCORE: 1.230769230769230769

## 2021-10-13 NOTE — ASSESSMENT & PLAN NOTE
Patient will be given Augmentin x7 days  Discussed ER precautions given blood pressure slightly low today at 96/58, these include feelings of syncope or presyncope.  High fevers or other signs of sepsis.  Follow-up as needed

## 2021-10-13 NOTE — PROGRESS NOTES
Subjective:     CC: multiple concerns    HPI:   Kristen presents today with     Problem   Aom (Acute Otitis Media)    Patient with left ear pain and discomfort following daughter with recent similar infection symptoms.  She additionally feels as if she is having some trouble breathing but only at night.  She does not have any associated cough, sore throat, fevers, chills or other sick symptoms.  She is not having any rhinorrhea at this point.  She has no known seasonal allergies.     Hypothyroidism    Patient most recent TSH 37.  She continues to be on levothyroxine with no recent dosage changes.  She has anxiety among other medical issues and is difficult to say whether her fleeting feelings of not feeling well is attributable to underlying thyroid issues, anxiety or other medical problems.         Current Outpatient Medications Ordered in Epic   Medication Sig Dispense Refill   • amoxicillin-clavulanate (AUGMENTIN) 875-125 MG Tab Take 1 Tablet by mouth 2 times a day. 14 Tablet 0   • diclofenac sodium (VOLTAREN) 1 % Gel Apply 4 g topically 2 times a day. (Patient not taking: Reported on 10/13/2021) 50 g 0   • ferrous sulfate 325 (65 Fe) MG tablet Take 1 Tab by mouth every day. (Patient not taking: Reported on 10/4/2021) 30 Tab 0   • docusate sodium (COLACE) 100 MG Cap Take 1 Cap by mouth 2 times a day. (Patient not taking: Reported on 10/4/2021) 60 Cap 0   • ondansetron (ZOFRAN ODT) 4 MG TABLET DISPERSIBLE Take 1 Tab by mouth every 8 hours as needed. (Patient not taking: Reported on 10/4/2021) 20 Tab 0   • levothyroxine (SYNTHROID) 75 MCG Tab Take 100 mcg by mouth every morning on an empty stomach. (Patient not taking: Reported on 10/13/2021)     • Gabapentin (NEURONTIN PO) Take 300 mg by mouth every bedtime. (Patient not taking: Reported on 10/4/2021)       No current [facility]-ordered facility-administered medications on file.       ROS:  Gen: no fevers/chills, no changes in weight  Eyes: no changes in vision  ENT: as  "above  Pulm: no SOB, no cough  CV: no chest pain, no palpitations  GI: no nausea/vomiting, no diarrhea  : no dysuria  MSk: no myalgias  Skin: no rash  Neuro: no headaches, no numbness/tingling  Heme/Lymph: no easy bruising      Objective:     Exam:  BP (!) 96/58 (BP Location: Right arm, Patient Position: Sitting, BP Cuff Size: Adult)   Pulse 80   Temp 36.7 °C (98 °F) (Temporal)   Resp 12   Ht 1.6 m (5' 3\")   Wt 61.2 kg (135 lb)   SpO2 94%   BMI 23.91 kg/m²  Body mass index is 23.91 kg/m².    Gen: Alert and oriented, No apparent distress.  HEENT: PERRL, EOMI, external ears normal bilat, L TM normal but moderate erythema in the canal, nose roughly midline, atraumatic, normocephalic  Neck: Neck is supple without lymphadenopathy.  Lungs: CTA bilaterally, no wheezes, rhonchi, or rales, symmetric chest rise  CV: Regular rate and rhythm. No murmurs, rubs, or gallops.  GI:       No rebound, no guarding, normal bowel sounds  :      No CVA tenderness, bladder non-tender to palp  Ext: No clubbing, cyanosis, edema.  Neuro: Gait appropriate for age, no focal deficits  Psych: Affect and mood congruent without abnormality  Skin:    No rashes, no jaundice      Labs:     Assessment & Plan:     36 y.o. female with the following -     Problem List Items Addressed This Visit     Hypothyroidism     Ordered TSH today review when results available  Adjust levothyroxine dose as needed.         Relevant Orders    TSH WITH REFLEX TO FT4    AOM (acute otitis media)     Patient will be given Augmentin x7 days  Discussed ER precautions given blood pressure slightly low today at 96/58, these include feelings of syncope or presyncope.  High fevers or other signs of sepsis.  Follow-up as needed         Relevant Medications    amoxicillin-clavulanate (AUGMENTIN) 875-125 MG Tab          I spent a total of 30 minutes with record review, exam, communication with the patient, communication with other providers, and documentation of this " encounter.      Return if symptoms worsen or fail to improve.    Please note that this dictation was created using voice recognition software. I have made every reasonable attempt to correct obvious errors, but I expect that there are errors of grammar and possibly content that I did not discover before finalizing the note.

## 2021-10-15 ENCOUNTER — GYNECOLOGY VISIT (OUTPATIENT)
Dept: OBGYN | Facility: CLINIC | Age: 36
End: 2021-10-15
Payer: MEDICAID

## 2021-10-15 VITALS — BODY MASS INDEX: 24.27 KG/M2 | DIASTOLIC BLOOD PRESSURE: 60 MMHG | SYSTOLIC BLOOD PRESSURE: 112 MMHG | WEIGHT: 137 LBS

## 2021-10-15 DIAGNOSIS — Z31.69 PRE-CONCEPTION COUNSELING: ICD-10-CM

## 2021-10-15 PROCEDURE — 99212 OFFICE O/P EST SF 10 MIN: CPT | Performed by: OBSTETRICS & GYNECOLOGY

## 2021-10-15 ASSESSMENT — FIBROSIS 4 INDEX: FIB4 SCORE: 1.230769230769230769

## 2021-10-15 NOTE — PROGRESS NOTES
36 y.o.  female previously seen for : routine gyn care, pregnancy and delivery, and contraceptive management   Chief Complaint:  Discuss future pregnancy    Specialty Problems     None      . Patient now here in follow up.     Patient's last menstrual period was 09/15/2021 (approximate).      Kristen is a previous patient of mine that I cared for her during her recent pregnancy. Her daughter is now 10 months old and doing well! SHe had a c/section for failed induction and was induced secondary to maternal age. She currently has a Nexplanon in place. She desires to have another pregnancy. She wants to know if waiting one year in between because of section is adequate. We had a long discussion about the risks of another pregnancy with her meedical history including bipolar diorder, kenia-danlos disiease, and Advanced maternal age. All risks discussde. We also discussed TOLAC versus repeat c/s and she desires repeat c/s. All of her questions are answered and she plans to have Nexplanon removed at the the end of the year. She is continuing on PNVs and is encouraged to continue. She is welcome to return here for removal or with family Medicine who placed it.           Current Outpatient Medications:   •  amoxicillin-clavulanate (AUGMENTIN) 875-125 MG Tab, Take 1 Tablet by mouth 2 times a day., Disp: 14 Tablet, Rfl: 0  •  levothyroxine (SYNTHROID) 75 MCG Tab, Take 100 mcg by mouth every morning on an empty stomach., Disp: , Rfl:   •  diclofenac sodium (VOLTAREN) 1 % Gel, Apply 4 g topically 2 times a day. (Patient not taking: Reported on 10/13/2021), Disp: 50 g, Rfl: 0  •  ferrous sulfate 325 (65 Fe) MG tablet, Take 1 Tab by mouth every day. (Patient not taking: Reported on 10/4/2021), Disp: 30 Tab, Rfl: 0  •  docusate sodium (COLACE) 100 MG Cap, Take 1 Cap by mouth 2 times a day. (Patient not taking: Reported on 10/4/2021), Disp: 60 Cap, Rfl: 0  •  ondansetron (ZOFRAN ODT) 4 MG TABLET DISPERSIBLE, Take 1 Tab by  mouth every 8 hours as needed. (Patient not taking: Reported on 10/4/2021), Disp: 20 Tab, Rfl: 0  •  Gabapentin (NEURONTIN PO), Take 300 mg by mouth every bedtime. (Patient not taking: Reported on 10/4/2021), Disp: , Rfl:   ROS: no change in ROS since visit of : Visit date not found.  :No results found for this or any previous visit (from the past 336 hour(s)).    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   • Depression    • Gastroparesis    • Heart burn    • Hypothyroid    • Pain     Left knee 5/10   • PTSD (post-traumatic stress disorder)      PGYN:   Social History     Socioeconomic History   • Marital status: Single     Spouse name: Not on file   • Number of children: Not on file   • Years of education: Not on file   • Highest education level: Not on file   Occupational History   • Not on file   Tobacco Use   • Smoking status: Former Smoker     Packs/day: 0.50     Years: 1.50     Pack years: 0.75     Quit date: 2009     Years since quittin.1   • Smokeless tobacco: Never Used   • Tobacco comment: one pack every 3months   Vaping Use   • Vaping Use: Never used   Substance and Sexual Activity   • Alcohol use: No   • Drug use: No   • Sexual activity: Not on file   Other Topics Concern   • Not on file   Social History Narrative   • Not on file     Social Determinants of Health     Financial Resource Strain:    • Difficulty of Paying Living Expenses:    Food Insecurity: No Food Insecurity   • Worried About Running Out of Food in the Last Year: Never true   • Ran Out of Food in the Last Year: Never true   Transportation Needs: No Transportation Needs   • Lack of Transportation (Medical): No   • Lack of Transportation (Non-Medical): No    Physical Activity:    • Days of Exercise per Week:    • Minutes of Exercise per Session:    Stress:    • Feeling of Stress :    Social Connections:    • Frequency of Communication with Friends and Family:    • Frequency of Social Gatherings with Friends and Family:    • Attends [employer] Services:    • Active Member of Clubs or Organizations:    • Attends Club or Organization Meetings:    • Marital Status:    Intimate Partner Violence:    • Fear of Current or Ex-Partner:    • Emotionally Abused:    • Physically Abused:    • Sexually Abused:      Family History   Problem Relation Age of Onset   • Diabetes Other    • Heart Disease Mother      Past Surgical History:   Procedure Laterality Date   • PB  DELIVERY ONLY N/A 2020    Procedure:  SECTION, PRIMARY;  Surgeon: Malinda Murdock M.D.;  Location: LABOR AND DELIVERY;  Service: Labor and Delivery   • OTHER      laparoscopic cholecystectomy    • KNEE ARTHROSCOPY  2010    Performed by KRYSTIAN COSTA at [facility]   • SYNOVECTOMY  2010    Performed by KRYSTIAN COSTA at [facility]   • MEDIAL MENISCECTOMY  2010    Performed by KRYSTIAN COSTA at [facility]   • KNEE ARTHROSCOPY  10/2009    left   • DENTAL EXTRACTION(S)  2007    wisdom teeth     Pap co -test normal in 2020      Exam:   General : Awake, alert and oriented x 3        Ass: Pregnancy counseling    Spent 15 minutes minutes with the patient ; Face to Face, with >50% of this time spent in counseling and coordination of care, surrounding the above mentioned issues - see HPI for dicsussion  P.     Follow up : as indicated/ desires for Nexplanon removal and for missed menses positive ept.

## 2021-12-02 ENCOUNTER — GYNECOLOGY VISIT (OUTPATIENT)
Dept: OBGYN | Facility: CLINIC | Age: 36
End: 2021-12-02
Payer: MEDICAID

## 2021-12-02 VITALS
DIASTOLIC BLOOD PRESSURE: 62 MMHG | SYSTOLIC BLOOD PRESSURE: 112 MMHG | WEIGHT: 130 LBS | BODY MASS INDEX: 23.04 KG/M2 | HEIGHT: 63 IN

## 2021-12-02 DIAGNOSIS — Z30.46 NEXPLANON REMOVAL: ICD-10-CM

## 2021-12-02 LAB
INT CON NEG: NEGATIVE
INT CON POS: POSITIVE
POC URINE PREGNANCY TEST: NEGATIVE

## 2021-12-02 PROCEDURE — 11982 REMOVE DRUG IMPLANT DEVICE: CPT | Performed by: OBSTETRICS & GYNECOLOGY

## 2021-12-02 PROCEDURE — 81025 URINE PREGNANCY TEST: CPT | Performed by: OBSTETRICS & GYNECOLOGY

## 2021-12-02 RX ORDER — ZIPRASIDONE HYDROCHLORIDE 40 MG/1
40 CAPSULE ORAL
COMMUNITY
Start: 2021-11-05 | End: 2022-12-21

## 2021-12-02 ASSESSMENT — FIBROSIS 4 INDEX: FIB4 SCORE: 1.230769230769230769

## 2021-12-02 NOTE — PROGRESS NOTES
Procedure Note : Nexplanon Removal     Nexplanon easily palpable in RUE.      Right upper extremity positioned so the medial aspect is exposed.  Area over palpable nexplanon insert cleansed with betadine x3.  approximately 2cc of 1% licocaine infiltrated over distal end of implant.  Approximately 2-3mm incision made with 11 blade, nexplanon easily grasped with hemostat and removed through incision, noted to be intact, and discarded.  Steri strip and pressure dressing placed.  Pt tolerated procedure well.  Complications: none        - prior to removal, I discussed w/ pt risks including pain/bleeding/infection.  All questions answered and consents signed.  Remove pressure dressing tonight or prior of uncomfortable    - alternate contraception: pt desires to conceive. She has been on prenatal vitamins and will continue. She remains on thyroid replacement and Geodon (was also on through out previous pregnancy) and she denies any new medical illnesses. Although she states she has been seen for having some lightheadedness and low blood pressure- no symptoms today. /70.    She is instructed to call with +ept. All ?S answered.

## 2022-01-27 ENCOUNTER — APPOINTMENT (OUTPATIENT)
Dept: MEDICAL GROUP | Facility: CLINIC | Age: 37
End: 2022-01-27
Payer: MEDICAID

## 2022-02-09 ENCOUNTER — TELEPHONE (OUTPATIENT)
Dept: OBGYN | Facility: CLINIC | Age: 37
End: 2022-02-09

## 2022-02-09 NOTE — TELEPHONE ENCOUNTER
Pt calls with concerns about low back pain and cramping? She states this is mild and that she has had 2 negative pregnancy tests. Pt is activly seeking pregnancy and states LMP was 1/26.She is advised that it is way too soon to do a urine pregnancy test and to repeat in 2 weeks.Precautions and instructions given and understood.tmm

## 2022-02-24 ENCOUNTER — TELEPHONE (OUTPATIENT)
Dept: OBGYN | Facility: CLINIC | Age: 37
End: 2022-02-24

## 2022-02-24 NOTE — TELEPHONE ENCOUNTER
Pt calls with questions about possible pregnancy. She states lmp was 1/24 and she understands time frame to do a urine hcg.Taking PNV'S.TMM

## 2022-03-03 ENCOUNTER — TELEPHONE (OUTPATIENT)
Dept: OBGYN | Facility: CLINIC | Age: 37
End: 2022-03-03
Payer: MEDICAID

## 2022-03-03 NOTE — TELEPHONE ENCOUNTER
Pt call to let us know that she started her menses on 3/28 and wonders when to take a urine pregnancy.Pt is again advised on what day to test and cycle discussion.diana

## 2022-04-04 ENCOUNTER — APPOINTMENT (OUTPATIENT)
Dept: MEDICAL GROUP | Facility: CLINIC | Age: 37
End: 2022-04-04
Payer: MEDICAID

## 2022-04-25 ENCOUNTER — OFFICE VISIT (OUTPATIENT)
Dept: MEDICAL GROUP | Facility: CLINIC | Age: 37
End: 2022-04-25
Payer: MEDICAID

## 2022-04-25 VITALS
RESPIRATION RATE: 16 BRPM | HEART RATE: 63 BPM | DIASTOLIC BLOOD PRESSURE: 74 MMHG | TEMPERATURE: 98 F | OXYGEN SATURATION: 96 % | HEIGHT: 63 IN | SYSTOLIC BLOOD PRESSURE: 107 MMHG | WEIGHT: 136 LBS | BODY MASS INDEX: 24.1 KG/M2

## 2022-04-25 DIAGNOSIS — R11.2 NAUSEA AND VOMITING, INTRACTABILITY OF VOMITING NOT SPECIFIED, UNSPECIFIED VOMITING TYPE: ICD-10-CM

## 2022-04-25 PROCEDURE — 99213 OFFICE O/P EST LOW 20 MIN: CPT | Mod: GE | Performed by: STUDENT IN AN ORGANIZED HEALTH CARE EDUCATION/TRAINING PROGRAM

## 2022-04-25 RX ORDER — LEVOTHYROXINE SODIUM 0.1 MG/1
TABLET ORAL
COMMUNITY
Start: 2022-02-25 | End: 2023-03-10

## 2022-04-25 RX ORDER — GABAPENTIN 300 MG/1
300 CAPSULE ORAL
COMMUNITY
Start: 2022-04-05 | End: 2023-06-28

## 2022-04-25 RX ORDER — TRAZODONE HYDROCHLORIDE 100 MG/1
100 TABLET ORAL NIGHTLY
COMMUNITY
Start: 2022-04-21 | End: 2022-11-11

## 2022-04-25 RX ORDER — OLANZAPINE 10 MG/1
TABLET ORAL
COMMUNITY
Start: 2022-02-07 | End: 2022-06-01

## 2022-04-25 ASSESSMENT — FIBROSIS 4 INDEX: FIB4 SCORE: 1.26

## 2022-04-25 NOTE — PROGRESS NOTES
"Subjective:     CC: nausea     HPI:   Kristen presents today with :    Problem   Nausea and Vomiting    Patient comes in today complaining of ongoing nausea, vomiting, and abdominal pain.  She was seen at [facility] on  for ongoing nausea and vomiting.  Associated with breast tenderness.  Patient believes that she is pregnant.  Multiple pregnancy tests have been negative including 1 at [facility].         Current Outpatient Medications Ordered in Epic   Medication Sig Dispense Refill   • traZODone (DESYREL) 100 MG Tab      • olanzapine (ZYPREXA) 10 MG tablet      • levothyroxine (SYNTHROID) 100 MCG Tab      • gabapentin (NEURONTIN) 300 MG Cap      • ziprasidone (GEODON) 40 MG Cap      • levothyroxine (SYNTHROID) 75 MCG Tab Take 100 mcg by mouth every morning on an empty stomach.       No current [facility]-ordered facility-administered medications on file.         ROS:  Gen: no fevers/chills, no changes in weight  Pulm: no sob, no cough  CV: no chest pain, no palpitations  GI: no nausea/vomiting, no diarrhea    Objective:     Exam:  /74   Pulse 63   Temp 36.7 °C (98 °F) (Temporal)   Resp 16   Ht 1.6 m (5' 3\")   Wt 61.7 kg (136 lb)   SpO2 96%   BMI 24.09 kg/m²  Body mass index is 24.09 kg/m².    Gen: Alert and oriented, No apparent distress.  Neck: Neck is supple without lymphadenopathy.  Lungs: Normal effort, CTA bilaterally, no wheezes, rhonchi, or rales  CV: Regular rate and rhythm. No murmurs, rubs, or gallops.  ABD: Hypoactive bowel sounds, soft, nondistended, nontender to palpation.  Well-healed  scar in suprapubic region.  Ext: No clubbing, cyanosis, edema.    Labs: ordered     Assessment & Plan:     37 y.o. female with the following -     Problem List Items Addressed This Visit     Nausea and vomiting     Differentials include pregnancy versus increased dose Geodon versus side effects of EDS.  We check lab work including TSH, beta hCG, CMP, and CBC.  Advised patient to return to " clinic her symptoms continue to worsen.  Advised patient that this was not a side effect of the Nexplanon that was moved in December.  We will call patient with her lab results.  Encouraged her to follow-up with psychiatry if she is having adverse side effects from the increased dose of ziprasidone. RTC PRN         Relevant Orders    CBC WITH DIFFERENTIAL    Comp Metabolic Panel    HCG QUAL SERUM    TSH WITH REFLEX TO FT4        No follow-ups on file.    Yeimi Cochran MD   PGY2

## 2022-04-25 NOTE — ASSESSMENT & PLAN NOTE
Differentials include pregnancy versus increased dose Geodon versus side effects of EDS.  We check lab work including TSH, beta hCG, CMP, and CBC.  Advised patient to return to clinic her symptoms continue to worsen.  Advised patient that this was not a side effect of the Nexplanon that was moved in December.  We will call patient with her lab results.  Encouraged her to follow-up with psychiatry if she is having adverse side effects from the increased dose of ziprasidone. RTC PRN

## 2022-05-27 ENCOUNTER — HOSPITAL ENCOUNTER (OUTPATIENT)
Dept: LAB | Facility: MEDICAL CENTER | Age: 37
End: 2022-05-27
Attending: STUDENT IN AN ORGANIZED HEALTH CARE EDUCATION/TRAINING PROGRAM
Payer: MEDICAID

## 2022-05-27 DIAGNOSIS — R11.2 NAUSEA AND VOMITING, INTRACTABILITY OF VOMITING NOT SPECIFIED, UNSPECIFIED VOMITING TYPE: ICD-10-CM

## 2022-05-27 LAB
BASOPHILS # BLD AUTO: 0.3 % (ref 0–1.8)
BASOPHILS # BLD: 0.02 K/UL (ref 0–0.12)
EOSINOPHIL # BLD AUTO: 0.05 K/UL (ref 0–0.51)
EOSINOPHIL NFR BLD: 0.8 % (ref 0–6.9)
ERYTHROCYTE [DISTWIDTH] IN BLOOD BY AUTOMATED COUNT: 44.2 FL (ref 35.9–50)
HCG SERPL QL: NEGATIVE
HCT VFR BLD AUTO: 42.3 % (ref 37–47)
HGB BLD-MCNC: 14.2 G/DL (ref 12–16)
IMM GRANULOCYTES # BLD AUTO: 0.02 K/UL (ref 0–0.11)
IMM GRANULOCYTES NFR BLD AUTO: 0.3 % (ref 0–0.9)
LYMPHOCYTES # BLD AUTO: 2 K/UL (ref 1–4.8)
LYMPHOCYTES NFR BLD: 33.8 % (ref 22–41)
MCH RBC QN AUTO: 31.2 PG (ref 27–33)
MCHC RBC AUTO-ENTMCNC: 33.6 G/DL (ref 33.6–35)
MCV RBC AUTO: 93 FL (ref 81.4–97.8)
MONOCYTES # BLD AUTO: 0.51 K/UL (ref 0–0.85)
MONOCYTES NFR BLD AUTO: 8.6 % (ref 0–13.4)
NEUTROPHILS # BLD AUTO: 3.32 K/UL (ref 2–7.15)
NEUTROPHILS NFR BLD: 56.2 % (ref 44–72)
NRBC # BLD AUTO: 0 K/UL
NRBC BLD-RTO: 0 /100 WBC
PLATELET # BLD AUTO: 204 K/UL (ref 164–446)
PMV BLD AUTO: 11 FL (ref 9–12.9)
RBC # BLD AUTO: 4.55 M/UL (ref 4.2–5.4)
T4 FREE SERPL-MCNC: 0.85 NG/DL (ref 0.93–1.7)
TSH SERPL DL<=0.005 MIU/L-ACNC: 12.4 UIU/ML (ref 0.38–5.33)
WBC # BLD AUTO: 5.9 K/UL (ref 4.8–10.8)

## 2022-05-27 PROCEDURE — 84439 ASSAY OF FREE THYROXINE: CPT

## 2022-05-27 PROCEDURE — 85025 COMPLETE CBC W/AUTO DIFF WBC: CPT

## 2022-05-27 PROCEDURE — 84443 ASSAY THYROID STIM HORMONE: CPT

## 2022-05-27 PROCEDURE — 84703 CHORIONIC GONADOTROPIN ASSAY: CPT

## 2022-05-27 PROCEDURE — 36415 COLL VENOUS BLD VENIPUNCTURE: CPT

## 2022-06-01 ENCOUNTER — OFFICE VISIT (OUTPATIENT)
Dept: MEDICAL GROUP | Facility: CLINIC | Age: 37
End: 2022-06-01
Payer: MEDICAID

## 2022-06-01 VITALS
WEIGHT: 133 LBS | RESPIRATION RATE: 16 BRPM | HEIGHT: 63 IN | OXYGEN SATURATION: 95 % | TEMPERATURE: 98 F | DIASTOLIC BLOOD PRESSURE: 73 MMHG | HEART RATE: 80 BPM | SYSTOLIC BLOOD PRESSURE: 112 MMHG | BODY MASS INDEX: 23.57 KG/M2

## 2022-06-01 DIAGNOSIS — N64.4 BREAST PAIN: ICD-10-CM

## 2022-06-01 DIAGNOSIS — E03.9 HYPOTHYROIDISM, UNSPECIFIED TYPE: ICD-10-CM

## 2022-06-01 PROCEDURE — 99213 OFFICE O/P EST LOW 20 MIN: CPT | Mod: GE | Performed by: STUDENT IN AN ORGANIZED HEALTH CARE EDUCATION/TRAINING PROGRAM

## 2022-06-01 NOTE — PROGRESS NOTES
"Subjective:     CC: breast pain    HPI:   Kristen presents today with :    Problem   Breast Pain    Patient is here today to discuss left breast pain.  Patient states that she has been discontinuing breast-feeding.  She is still producing a minor amount of thick colostrum like milk.     Hypothyroidism    TSH elevated at 12.  Patient states she has not been taking her medication regularly.         Current Outpatient Medications Ordered in Epic   Medication Sig Dispense Refill   • traZODone (DESYREL) 100 MG Tab      • levothyroxine (SYNTHROID) 100 MCG Tab      • gabapentin (NEURONTIN) 300 MG Cap      • ziprasidone (GEODON) 40 MG Cap        No current Epic-ordered facility-administered medications on file.         ROS:  Gen: no fevers/chills, no changes in weight  Pulm: no sob, no cough  CV: no chest pain, no palpitations  GI: no nausea/vomiting, no diarrhea    Objective:     Exam:  /73   Pulse 80   Temp 36.7 °C (98 °F) (Temporal)   Resp 16   Ht 1.6 m (5' 3\")   Wt 60.3 kg (133 lb)   SpO2 95%   BMI 23.56 kg/m²  Body mass index is 23.56 kg/m².    Gen: Alert and oriented, No apparent distress.  Neck: Neck is supple without lymphadenopathy.  Breast: No masses, lumps, bumps palpated.  No erythema or streaking noted.  No nipple discharge is present.  Ext: No clubbing, cyanosis, edema.    Labs: reviewed     Assessment & Plan:     37 y.o. female with the following -     Problem List Items Addressed This Visit     Hypothyroidism     Advised patient to resume taking her levothyroxine on an empty stomach.  Repeat lab slip provided for 6 weeks.  Patient states understanding and is agreeable to restarting her medication.  We will recheck her thyroid function in 6 to 8 weeks.           Relevant Orders    TSH WITH REFLEX TO FT4    Breast pain     This is not mastitis.  Physical exam largely reassuring.  Recommend that patient wear tighter bras, ice her breasts, stop milking-this will decrease her production and likely " improve her pain.  Return precautions were discussed with patient.  She states understanding and she will do this.                   No follow-ups on file.    Yeimi Cochran MD   PGY2

## 2022-06-01 NOTE — ASSESSMENT & PLAN NOTE
This is not mastitis.  Physical exam largely reassuring.  Recommend that patient wear tighter bras, ice her breasts, stop milking-this will decrease her production and likely improve her pain.  Return precautions were discussed with patient.  She states understanding and she will do this.

## 2022-06-01 NOTE — ASSESSMENT & PLAN NOTE
Advised patient to resume taking her levothyroxine on an empty stomach.  Repeat lab slip provided for 6 weeks.  Patient states understanding and is agreeable to restarting her medication.  We will recheck her thyroid function in 6 to 8 weeks.

## 2022-07-26 ENCOUNTER — OFFICE VISIT (OUTPATIENT)
Dept: MEDICAL GROUP | Facility: CLINIC | Age: 37
End: 2022-07-26
Payer: MEDICARE

## 2022-07-26 VITALS
WEIGHT: 130 LBS | RESPIRATION RATE: 16 BRPM | BODY MASS INDEX: 23.92 KG/M2 | DIASTOLIC BLOOD PRESSURE: 63 MMHG | HEIGHT: 62 IN | HEART RATE: 67 BPM | OXYGEN SATURATION: 96 % | TEMPERATURE: 98 F | SYSTOLIC BLOOD PRESSURE: 100 MMHG

## 2022-07-26 DIAGNOSIS — N64.3 GALACTORRHEA: ICD-10-CM

## 2022-07-26 LAB
INT CON NEG: NORMAL
INT CON POS: NORMAL
POC URINE PREGNANCY TEST: NEGATIVE

## 2022-07-26 PROCEDURE — 99213 OFFICE O/P EST LOW 20 MIN: CPT | Mod: GE | Performed by: STUDENT IN AN ORGANIZED HEALTH CARE EDUCATION/TRAINING PROGRAM

## 2022-07-26 PROCEDURE — 81025 URINE PREGNANCY TEST: CPT | Performed by: STUDENT IN AN ORGANIZED HEALTH CARE EDUCATION/TRAINING PROGRAM

## 2022-07-26 NOTE — ASSESSMENT & PLAN NOTE
- Pregnancy test in office today was negative  - Discussed with patient that she could take another pregnancy test in approximately 2 weeks, as this would be more accurate  - Checking TSH, prolactin, CMP

## 2022-07-26 NOTE — PROGRESS NOTES
"Subjective:     CC: Pregnancy test    HPI:   Kristen presents today with:    Problem   Galactorrhea    - Patient states that she stopped breast-feeding in 2021 after having her first child (patient is a )  - She states that she has had ongoing nonpainful, bilateral milk production  - She states that she takes gabapentin and Geodon for bipolar disorder  - Patient states that her LMP started 7/10/2022, was regular; however, she had a bit of spotting last week which was unusual for her  - Patient denies having any spotting at this time  - Patient states she is trying to get pregnant with a second child and is not on any birth control at this time         Current Outpatient Medications Ordered in Epic   Medication Sig Dispense Refill   • traZODone (DESYREL) 100 MG Tab      • levothyroxine (SYNTHROID) 100 MCG Tab      • gabapentin (NEURONTIN) 300 MG Cap      • ziprasidone (GEODON) 40 MG Cap        No current Epic-ordered facility-administered medications on file.       ROS:  Gen: no fevers/chills, no changes in weight  Eyes: no changes in vision  ENT: no changes in hearing  Pulm: no sob, no cough  CV: no chest pain, no palpitations  GI: no nausea/vomiting, no diarrhea  MSk: no myalgias  Skin: no rash  Neuro: no headaches, no numbness/tingling      Objective:     Exam:  /63 (BP Location: Right arm, Patient Position: Sitting, BP Cuff Size: Adult)   Pulse 67   Temp 36.7 °C (98 °F) (Temporal)   Resp 16   Ht 1.575 m (5' 2\")   Wt 59 kg (130 lb)   SpO2 96%   BMI 23.78 kg/m²  Body mass index is 23.78 kg/m².    Gen: Alert and oriented, No apparent distress.  Neck: Neck is supple without lymphadenopathy.  Lungs: Normal effort, CTA bilaterally, no wheezes, rhonchi, or rales  CV: Regular rate and rhythm. No murmurs, rubs, or gallops.  Ext: No clubbing, cyanosis, edema.      Assessment & Plan:     37 y.o. female with the following -     Problem List Items Addressed This Visit     Galactorrhea     - " Pregnancy test in office today was negative  - Discussed with patient that she could take another pregnancy test in approximately 2 weeks, as this would be more accurate  - Checking TSH, prolactin, CMP           Relevant Orders    Comp Metabolic Panel    TSH WITH REFLEX TO FT4    PROLACTIN    POCT Pregnancy (Completed)            Return in about 2 weeks (around 8/9/2022).    Ozzie Pritchett MD   PGY-3 Family Medicine Resident   [facility]

## 2022-07-31 ENCOUNTER — HOSPITAL ENCOUNTER (EMERGENCY)
Facility: MEDICAL CENTER | Age: 37
End: 2022-07-31
Attending: EMERGENCY MEDICINE
Payer: MEDICARE

## 2022-07-31 VITALS
RESPIRATION RATE: 16 BRPM | TEMPERATURE: 99 F | OXYGEN SATURATION: 99 % | SYSTOLIC BLOOD PRESSURE: 121 MMHG | HEART RATE: 74 BPM | BODY MASS INDEX: 23.9 KG/M2 | DIASTOLIC BLOOD PRESSURE: 68 MMHG | HEIGHT: 62 IN | WEIGHT: 129.85 LBS

## 2022-07-31 DIAGNOSIS — N94.6 DYSMENORRHEA: ICD-10-CM

## 2022-07-31 LAB
ALBUMIN SERPL BCP-MCNC: 4.7 G/DL (ref 3.2–4.9)
ALBUMIN/GLOB SERPL: 1.8 G/DL
ALP SERPL-CCNC: 57 U/L (ref 30–99)
ALT SERPL-CCNC: 8 U/L (ref 2–50)
ANION GAP SERPL CALC-SCNC: 13 MMOL/L (ref 7–16)
AST SERPL-CCNC: 12 U/L (ref 12–45)
BASOPHILS # BLD AUTO: 0.3 % (ref 0–1.8)
BASOPHILS # BLD: 0.03 K/UL (ref 0–0.12)
BILIRUB SERPL-MCNC: 0.5 MG/DL (ref 0.1–1.5)
BUN SERPL-MCNC: 9 MG/DL (ref 8–22)
CALCIUM SERPL-MCNC: 9.2 MG/DL (ref 8.5–10.5)
CHLORIDE SERPL-SCNC: 106 MMOL/L (ref 96–112)
CO2 SERPL-SCNC: 20 MMOL/L (ref 20–33)
CREAT SERPL-MCNC: 0.6 MG/DL (ref 0.5–1.4)
EOSINOPHIL # BLD AUTO: 0.04 K/UL (ref 0–0.51)
EOSINOPHIL NFR BLD: 0.5 % (ref 0–6.9)
ERYTHROCYTE [DISTWIDTH] IN BLOOD BY AUTOMATED COUNT: 40.9 FL (ref 35.9–50)
GFR SERPLBLD CREATININE-BSD FMLA CKD-EPI: 118 ML/MIN/1.73 M 2
GLOBULIN SER CALC-MCNC: 2.6 G/DL (ref 1.9–3.5)
GLUCOSE SERPL-MCNC: 98 MG/DL (ref 65–99)
HCG SERPL QL: NEGATIVE
HCT VFR BLD AUTO: 40.4 % (ref 37–47)
HGB BLD-MCNC: 14.4 G/DL (ref 12–16)
IMM GRANULOCYTES # BLD AUTO: 0.04 K/UL (ref 0–0.11)
IMM GRANULOCYTES NFR BLD AUTO: 0.5 % (ref 0–0.9)
LYMPHOCYTES # BLD AUTO: 1.86 K/UL (ref 1–4.8)
LYMPHOCYTES NFR BLD: 21.4 % (ref 22–41)
MCH RBC QN AUTO: 31.2 PG (ref 27–33)
MCHC RBC AUTO-ENTMCNC: 35.6 G/DL (ref 33.6–35)
MCV RBC AUTO: 87.6 FL (ref 81.4–97.8)
MONOCYTES # BLD AUTO: 0.73 K/UL (ref 0–0.85)
MONOCYTES NFR BLD AUTO: 8.4 % (ref 0–13.4)
NEUTROPHILS # BLD AUTO: 6 K/UL (ref 2–7.15)
NEUTROPHILS NFR BLD: 68.9 % (ref 44–72)
NRBC # BLD AUTO: 0 K/UL
NRBC BLD-RTO: 0 /100 WBC
PLATELET # BLD AUTO: 221 K/UL (ref 164–446)
PMV BLD AUTO: 10.1 FL (ref 9–12.9)
POTASSIUM SERPL-SCNC: 3.4 MMOL/L (ref 3.6–5.5)
PROT SERPL-MCNC: 7.3 G/DL (ref 6–8.2)
RBC # BLD AUTO: 4.61 M/UL (ref 4.2–5.4)
SODIUM SERPL-SCNC: 139 MMOL/L (ref 135–145)
WBC # BLD AUTO: 8.7 K/UL (ref 4.8–10.8)

## 2022-07-31 PROCEDURE — 36415 COLL VENOUS BLD VENIPUNCTURE: CPT | Mod: EDC

## 2022-07-31 PROCEDURE — 84703 CHORIONIC GONADOTROPIN ASSAY: CPT

## 2022-07-31 PROCEDURE — 85025 COMPLETE CBC W/AUTO DIFF WBC: CPT

## 2022-07-31 PROCEDURE — 80053 COMPREHEN METABOLIC PANEL: CPT

## 2022-07-31 PROCEDURE — 99284 EMERGENCY DEPT VISIT MOD MDM: CPT | Mod: EDC

## 2022-07-31 ASSESSMENT — LIFESTYLE VARIABLES
EVER HAD A DRINK FIRST THING IN THE MORNING TO STEADY YOUR NERVES TO GET RID OF A HANGOVER: NO
TOTAL SCORE: 0
TOTAL SCORE: 0
DOES PATIENT WANT TO STOP DRINKING: NO
DO YOU DRINK ALCOHOL: NO
CONSUMPTION TOTAL: INCOMPLETE
TOTAL SCORE: 0
HAVE YOU EVER FELT YOU SHOULD CUT DOWN ON YOUR DRINKING: NO
HAVE PEOPLE ANNOYED YOU BY CRITICIZING YOUR DRINKING: NO
EVER FELT BAD OR GUILTY ABOUT YOUR DRINKING: NO

## 2022-07-31 NOTE — ED NOTES
LMP July 10th, 3 blood clots today, no active bleeding. Denies abd pain, fevers, dizziness or fevers. Aware to remain NPO MD to bedside.

## 2022-07-31 NOTE — ED TRIAGE NOTES
Ambulatory to triage with   Chief Complaint   Patient presents with   • Vaginal Bleeding   • Cramping   LMC 7/10/22. State she has been trying to conceive. Has photos of medium sized blood clots x 2. Concerned for miscarriage. C/o 2/10 lower abd cramping. Protocol ordered.

## 2022-07-31 NOTE — ED PROVIDER NOTES
ED Provider Note    CHIEF COMPLAINT  Chief Complaint   Patient presents with   • Vaginal Bleeding   • Cramping         HPI  Kristen Luna is a 37 y.o. female who presents to the emergency department with vaginal bleeding.  G1, P1.  Negative home pregnancy test and a negative pregnancy test at her doctor's office this last week.  LMP 7/ and typically has a 28-day cycle.  She started bleeding this morning.  She passed some clots and had lower abdominal cramping.  Clotting seemed heavier than normal.  She was worried that she might actually be pregnant and was having a miscarriage.  She is currently trying to conceive.  She has not had any nausea or vomiting.  Normal bowel movements.  No dysuria hematuria frequency.  No dizziness lightheadedness.      REVIEW OF SYSTEMS  As per HPI  All other systems are negative.     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   • Depression    • Gastroparesis    • Heart burn    • Hypothyroid    • Pain     Left knee 5/10   • PTSD (post-traumatic stress disorder)        FAMILY HISTORY  Family History   Problem Relation Age of Onset   • Diabetes Other    • Heart Disease Mother        SOCIAL HISTORY  Social History     Tobacco Use   • Smoking status: Former Smoker     Packs/day: 0.50     Years: 1.50     Pack years: 0.75     Quit date: 2009     Years since quittin.9   • Smokeless tobacco: Never Used   • Tobacco comment: one pack every 3months   Vaping Use   • Vaping Use: Never used   Substance Use Topics   • Alcohol use: No   • Drug use: No       SURGICAL HISTORY  Past Surgical History:   Procedure Laterality Date   • LA  DELIVERY ONLY N/A 2020    Procedure:  SECTION, PRIMARY;  Surgeon: Malinda Murdock M.D.;  Location: LABOR AND  "DELIVERY;  Service: Labor and Delivery   • OTHER  2018    laparoscopic cholecystectomy 2018   • KNEE ARTHROSCOPY  1/4/2010    Performed by KRYSTIAN COSTA at [facility]   • SYNOVECTOMY  1/4/2010    Performed by KRYSTIAN COSTA at [facility]   • MENISCECTOMY, KNEE, MEDIAL  1/4/2010    Performed by KRYSTIAN COSTA at [facility]   • KNEE ARTHROSCOPY  10/2009    left   • DENTAL EXTRACTION(S)  2007    wisdom teeth       CURRENT MEDICATIONS  Home Medications     Reviewed by Maggy Costa R.N. (Registered Nurse) on 07/31/22 at 1154  Med List Status: Complete   Medication Last Dose Status   gabapentin (NEURONTIN) 300 MG Cap 7/30/2022 Active   levothyroxine (SYNTHROID) 100 MCG Tab 7/30/2022 Active   traZODone (DESYREL) 100 MG Tab 7/30/2022 Active   ziprasidone (GEODON) 40 MG Cap 7/30/2022 Active                ALLERGIES  Allergies   Allergen Reactions   • Codeine    • Hydrocodone Itching   • Naprosyn [Naproxen] Hives   • Zoloft      Left hand swelling   • Fentanyl Photosensitivity     Reports blurred vision        PHYSICAL EXAM  VITAL SIGNS: /75   Pulse 77   Temp 36.6 °C (97.8 °F) (Temporal)   Resp 16   Ht 1.575 m (5' 2\")   Wt 58.9 kg (129 lb 13.6 oz)   LMP 07/10/2022 (Exact Date)   SpO2 95%   BMI 23.75 kg/m²   Constitutional: Awake and alert  Eyes: Sclera white  Neck: Normal range of motion  Cardiovascular: Normal heart rate  Thorax & Lungs: No respiratory distress  Abdomen: Mildly tender to palpation across the lower abdomen worse in the right than on the left.  No upper abdominal tenderness.  No rebound or peritonitis  Skin: No rash.   Extremities: Intact, symmetric distal pulses, no edema.  Neurologic: Grossly normal        Labs:   Results for orders placed or performed during the hospital encounter of 07/31/22   CBC WITH DIFFERENTIAL   Result Value Ref Range    WBC 8.7 4.8 - 10.8 K/uL    RBC 4.61 4.20 - 5.40 M/uL    Hemoglobin 14.4 12.0 - 16.0 " g/dL    Hematocrit 40.4 37.0 - 47.0 %    MCV 87.6 81.4 - 97.8 fL    MCH 31.2 27.0 - 33.0 pg    MCHC 35.6 (H) 33.6 - 35.0 g/dL    RDW 40.9 35.9 - 50.0 fL    Platelet Count 221 164 - 446 K/uL    MPV 10.1 9.0 - 12.9 fL    Neutrophils-Polys 68.90 44.00 - 72.00 %    Lymphocytes 21.40 (L) 22.00 - 41.00 %    Monocytes 8.40 0.00 - 13.40 %    Eosinophils 0.50 0.00 - 6.90 %    Basophils 0.30 0.00 - 1.80 %    Immature Granulocytes 0.50 0.00 - 0.90 %    Nucleated RBC 0.00 /100 WBC    Neutrophils (Absolute) 6.00 2.00 - 7.15 K/uL    Lymphs (Absolute) 1.86 1.00 - 4.80 K/uL    Monos (Absolute) 0.73 0.00 - 0.85 K/uL    Eos (Absolute) 0.04 0.00 - 0.51 K/uL    Baso (Absolute) 0.03 0.00 - 0.12 K/uL    Immature Granulocytes (abs) 0.04 0.00 - 0.11 K/uL    NRBC (Absolute) 0.00 K/uL   COMP METABOLIC PANEL   Result Value Ref Range    Sodium 139 135 - 145 mmol/L    Potassium 3.4 (L) 3.6 - 5.5 mmol/L    Chloride 106 96 - 112 mmol/L    Co2 20 20 - 33 mmol/L    Anion Gap 13.0 7.0 - 16.0    Glucose 98 65 - 99 mg/dL    Bun 9 8 - 22 mg/dL    Creatinine 0.60 0.50 - 1.40 mg/dL    Calcium 9.2 8.5 - 10.5 mg/dL    AST(SGOT) 12 12 - 45 U/L    ALT(SGPT) 8 2 - 50 U/L    Alkaline Phosphatase 57 30 - 99 U/L    Total Bilirubin 0.5 0.1 - 1.5 mg/dL    Albumin 4.7 3.2 - 4.9 g/dL    Total Protein 7.3 6.0 - 8.2 g/dL    Globulin 2.6 1.9 - 3.5 g/dL    A-G Ratio 1.8 g/dL   HCG QUAL SERUM   Result Value Ref Range    Beta-Hcg Qualitative Serum Negative Negative   ESTIMATED GFR   Result Value Ref Range    GFR (CKD-EPI) 118 >60 mL/min/1.73 m 2         COURSE & MEDICAL DECISION MAKING  Presents with abnormal uterine bleeding.  Her vital signs are normal.  Her exam was unremarkable.  She was worried that she might be pregnant having a miscarriage.  Obtained laboratory data that is all unremarkable.  She is not pregnant.  This appears to be her regular menstrual cycle.  I advised NSAIDs for discomfort.  Return to ER for concern.      FINAL IMPRESSION  1.  Abnormal uterine  bleeding        This dictation was created using voice recognition software. The accuracy of the dictation is limited to the abilities of the software.  The nursing notes were reviewed and certain aspects of this information were incorporated into this note.    Electronically signed by: Calvin Craig M.D., 7/31/2022 12:21 PM

## 2022-07-31 NOTE — ED NOTES
"[facility]    Discharge instructions, which include signs and symptoms to monitor patient for, as well as detailed information regarding dysmenorrhea  provided.  All questions and concerns addressed at this time.      Follow up visit with PCP encouraged.  [facility] office contact information with phone number and address provided.     Patient leaves ER in no apparent distress. This RN provided education regarding returning to the ER for any new concerns or changes in patient's condition.      /68   Pulse 74   Temp 37.2 °C (99 °F) (Temporal)   Resp 16   Ht 1.575 m (5' 2\")   Wt 58.9 kg (129 lb 13.6 oz)   LMP 07/10/2022 (Exact Date)   SpO2 99%   BMI 23.75 kg/m²     "

## 2022-08-02 ENCOUNTER — TELEPHONE (OUTPATIENT)
Dept: OBGYN | Facility: CLINIC | Age: 37
End: 2022-08-02
Payer: MEDICARE

## 2022-08-02 NOTE — TELEPHONE ENCOUNTER
----- Message from Qiana Gleason M.D. sent at 8/1/2022  5:16 PM PDT -----  Regarding: ER f/u      08/02/22  8:36 AM  Spoke with patient she is still having jello like blood clots. Discussed she is not anemic and bleeding can be from medications. Scheduled with Dr. Murdock follow-up

## 2022-08-03 ENCOUNTER — GYNECOLOGY VISIT (OUTPATIENT)
Dept: OBGYN | Facility: CLINIC | Age: 37
End: 2022-08-03
Payer: MEDICARE

## 2022-08-03 VITALS — WEIGHT: 129 LBS | BODY MASS INDEX: 23.59 KG/M2

## 2022-08-03 DIAGNOSIS — N92.0 MENORRHAGIA WITH REGULAR CYCLE: ICD-10-CM

## 2022-08-03 PROCEDURE — 99212 OFFICE O/P EST SF 10 MIN: CPT | Performed by: OBSTETRICS & GYNECOLOGY

## 2022-08-03 ASSESSMENT — FIBROSIS 4 INDEX: FIB4 SCORE: 0.71

## 2022-08-03 NOTE — PROGRESS NOTES
GYN ER F/U    Cc: Heavy bleeding    HPI: 37 y.o.  here for ER follow up after being seen several days ago. She is an existing patient of mine. She has had one pregnancy delivered at term via c/section. She is now almost 2 years old. 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 We removed her Nexplanon last December as they desire to conceive again. She reports she has been having normal monthly menses. This bleeding she experienced on  was just a lot heavier than what she is used to and with some clots but was normal in timing and duration. She does admit that she was not taking her thyroid as regularly as she should. She has now seen PCP and is taking her synthroid 100ug regularly. She has follow up labs scheduled with PCP. She states her Geodon dosing has remained the same.     We discussed the circumstances and the negative work up in the ER. Kristen is comfortable with managing her menses expectantly. She is definitely interested in conceiving. She will keep a menstrual calender, using OPK, and timing of intercourse for the next 3 months and will follow up with this information if she has not conceived in 3 months. All ?S answered.       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  with a single episode of menorrhagia who is trying to conceive. See HPI for discussion and plan.     My total time spent  caring for the patient on the day of the encounter was 20 minutes in which > 50% of the time was spent in direct physican to patient consultation and discussion as documented in HPI.         F/U: 3 months

## 2022-08-03 NOTE — ED NOTES
All lines and monitors discontinued. Discharge instructions given, questions answered.    ambulatory out of ER, escorted by family.  Instructed not to drive after taking pain medication and pt verbalizes understanding.     Doxycycline Pregnancy And Lactation Text: This medication is Pregnancy Category D and not consider safe during pregnancy. It is also excreted in breast milk but is considered safe for shorter treatment courses.

## 2022-08-05 ENCOUNTER — APPOINTMENT (OUTPATIENT)
Dept: RADIOLOGY | Facility: MEDICAL CENTER | Age: 37
End: 2022-08-05
Attending: EMERGENCY MEDICINE
Payer: MEDICARE

## 2022-08-05 ENCOUNTER — APPOINTMENT (OUTPATIENT)
Dept: RADIOLOGY | Facility: MEDICAL CENTER | Age: 37
End: 2022-08-05
Attending: PSYCHIATRY & NEUROLOGY
Payer: MEDICARE

## 2022-08-05 ENCOUNTER — HOSPITAL ENCOUNTER (EMERGENCY)
Facility: MEDICAL CENTER | Age: 37
End: 2022-08-05
Attending: EMERGENCY MEDICINE
Payer: MEDICARE

## 2022-08-05 VITALS
DIASTOLIC BLOOD PRESSURE: 65 MMHG | RESPIRATION RATE: 16 BRPM | WEIGHT: 130 LBS | HEIGHT: 62 IN | OXYGEN SATURATION: 97 % | BODY MASS INDEX: 23.92 KG/M2 | HEART RATE: 61 BPM | TEMPERATURE: 98.1 F | SYSTOLIC BLOOD PRESSURE: 114 MMHG

## 2022-08-05 DIAGNOSIS — R20.2 PARESTHESIA: ICD-10-CM

## 2022-08-05 LAB
ABO GROUP BLD: NORMAL
ALBUMIN SERPL BCP-MCNC: 4.5 G/DL (ref 3.2–4.9)
ALBUMIN/GLOB SERPL: 1.9 G/DL
ALP SERPL-CCNC: 54 U/L (ref 30–99)
ALT SERPL-CCNC: 6 U/L (ref 2–50)
ANION GAP SERPL CALC-SCNC: 13 MMOL/L (ref 7–16)
APTT PPP: 34.4 SEC (ref 24.7–36)
AST SERPL-CCNC: 8 U/L (ref 12–45)
BASOPHILS # BLD AUTO: 0.7 % (ref 0–1.8)
BASOPHILS # BLD: 0.05 K/UL (ref 0–0.12)
BILIRUB SERPL-MCNC: 0.6 MG/DL (ref 0.1–1.5)
BLD GP AB SCN SERPL QL: NORMAL
BUN SERPL-MCNC: 12 MG/DL (ref 8–22)
CALCIUM SERPL-MCNC: 8.6 MG/DL (ref 8.5–10.5)
CHLORIDE SERPL-SCNC: 104 MMOL/L (ref 96–112)
CO2 SERPL-SCNC: 21 MMOL/L (ref 20–33)
CREAT SERPL-MCNC: 0.71 MG/DL (ref 0.5–1.4)
EKG IMPRESSION: NORMAL
EOSINOPHIL # BLD AUTO: 0.08 K/UL (ref 0–0.51)
EOSINOPHIL NFR BLD: 1.1 % (ref 0–6.9)
ERYTHROCYTE [DISTWIDTH] IN BLOOD BY AUTOMATED COUNT: 41.8 FL (ref 35.9–50)
GFR SERPLBLD CREATININE-BSD FMLA CKD-EPI: 112 ML/MIN/1.73 M 2
GLOBULIN SER CALC-MCNC: 2.4 G/DL (ref 1.9–3.5)
GLUCOSE SERPL-MCNC: 89 MG/DL (ref 65–99)
HCT VFR BLD AUTO: 39.5 % (ref 37–47)
HGB BLD-MCNC: 13.9 G/DL (ref 12–16)
IMM GRANULOCYTES # BLD AUTO: 0.02 K/UL (ref 0–0.11)
IMM GRANULOCYTES NFR BLD AUTO: 0.3 % (ref 0–0.9)
INR PPP: 1.18 (ref 0.87–1.13)
LYMPHOCYTES # BLD AUTO: 2.75 K/UL (ref 1–4.8)
LYMPHOCYTES NFR BLD: 38.5 % (ref 22–41)
MCH RBC QN AUTO: 31.5 PG (ref 27–33)
MCHC RBC AUTO-ENTMCNC: 35.2 G/DL (ref 33.6–35)
MCV RBC AUTO: 89.6 FL (ref 81.4–97.8)
MONOCYTES # BLD AUTO: 0.69 K/UL (ref 0–0.85)
MONOCYTES NFR BLD AUTO: 9.7 % (ref 0–13.4)
NEUTROPHILS # BLD AUTO: 3.55 K/UL (ref 2–7.15)
NEUTROPHILS NFR BLD: 49.7 % (ref 44–72)
NRBC # BLD AUTO: 0 K/UL
NRBC BLD-RTO: 0 /100 WBC
PLATELET # BLD AUTO: 193 K/UL (ref 164–446)
PMV BLD AUTO: 10 FL (ref 9–12.9)
POTASSIUM SERPL-SCNC: 3.3 MMOL/L (ref 3.6–5.5)
PROT SERPL-MCNC: 6.9 G/DL (ref 6–8.2)
PROTHROMBIN TIME: 14.8 SEC (ref 12–14.6)
RBC # BLD AUTO: 4.41 M/UL (ref 4.2–5.4)
RH BLD: NORMAL
SODIUM SERPL-SCNC: 138 MMOL/L (ref 135–145)
TROPONIN T SERPL-MCNC: <6 NG/L (ref 6–19)
WBC # BLD AUTO: 7.1 K/UL (ref 4.8–10.8)

## 2022-08-05 PROCEDURE — 99285 EMERGENCY DEPT VISIT HI MDM: CPT

## 2022-08-05 PROCEDURE — 99284 EMERGENCY DEPT VISIT MOD MDM: CPT | Performed by: PSYCHIATRY & NEUROLOGY

## 2022-08-05 PROCEDURE — 85730 THROMBOPLASTIN TIME PARTIAL: CPT

## 2022-08-05 PROCEDURE — 86901 BLOOD TYPING SEROLOGIC RH(D): CPT

## 2022-08-05 PROCEDURE — 80053 COMPREHEN METABOLIC PANEL: CPT

## 2022-08-05 PROCEDURE — 85610 PROTHROMBIN TIME: CPT

## 2022-08-05 PROCEDURE — 86900 BLOOD TYPING SEROLOGIC ABO: CPT

## 2022-08-05 PROCEDURE — 36415 COLL VENOUS BLD VENIPUNCTURE: CPT

## 2022-08-05 PROCEDURE — 94760 N-INVAS EAR/PLS OXIMETRY 1: CPT

## 2022-08-05 PROCEDURE — 85025 COMPLETE CBC W/AUTO DIFF WBC: CPT

## 2022-08-05 PROCEDURE — 84484 ASSAY OF TROPONIN QUANT: CPT

## 2022-08-05 PROCEDURE — 93005 ELECTROCARDIOGRAM TRACING: CPT | Performed by: EMERGENCY MEDICINE

## 2022-08-05 PROCEDURE — 70496 CT ANGIOGRAPHY HEAD: CPT | Mod: MG

## 2022-08-05 PROCEDURE — 86850 RBC ANTIBODY SCREEN: CPT

## 2022-08-05 PROCEDURE — 700117 HCHG RX CONTRAST REV CODE 255: Performed by: EMERGENCY MEDICINE

## 2022-08-05 PROCEDURE — 71045 X-RAY EXAM CHEST 1 VIEW: CPT

## 2022-08-05 PROCEDURE — 70450 CT HEAD/BRAIN W/O DYE: CPT | Mod: MG

## 2022-08-05 PROCEDURE — 70498 CT ANGIOGRAPHY NECK: CPT | Mod: MG

## 2022-08-05 RX ADMIN — IOHEXOL 70 ML: 350 INJECTION, SOLUTION INTRAVENOUS at 19:30

## 2022-08-05 ASSESSMENT — FIBROSIS 4 INDEX: FIB4 SCORE: 0.81

## 2022-08-06 NOTE — DISCHARGE INSTRUCTIONS
Your labs and imaging were all very reassuring.  Please follow-up closely with your primary care physician

## 2022-08-06 NOTE — ED PROVIDER NOTES
ED Provider Note    CHIEF COMPLAINT  No chief complaint on file.      HPI  Kristen Luna is a 37 y.o. female who presents with complaint of numbness of the left side of her body.  Patient reports numbness started approximately 15 minutes prior to arriving to the emergency department.  Patient was driving her car when the symptoms began.  Patient denies any use of anticoagulants or recent head trauma.  Patient denies any associated weakness.  Patient denies any associated neck or back pain.  She denies any chest pain or shortness of breath.  Patient has a history of bipolar disorder and gastroparesis.  Patient reports that the numbness started in her jaw, flow down to her hands and into her legs and then back up into her jaw.  She denies any difficulty speaking, she denies any change in her vision.    REVIEW OF SYSTEMS  ROS    See HPI for further details. All other systems are negative.     PAST MEDICAL HISTORY   has a past medical history of Arrhythmia, Bipolar affective disorder (HCC), Breath shortness, Connective tissue anomaly, Depression, Gastroparesis, Heart burn, Hypothyroid, Pain, and PTSD (post-traumatic stress disorder).    SOCIAL HISTORY  Social History     Tobacco Use   • Smoking status: Former Smoker     Packs/day: 0.50     Years: 1.50     Pack years: 0.75     Quit date: 2009     Years since quittin.9   • Smokeless tobacco: Never Used   • Tobacco comment: one pack every 3months   Vaping Use   • Vaping Use: Never used   Substance and Sexual Activity   • Alcohol use: No   • Drug use: No   • Sexual activity: Yes     Partners: Male       SURGICAL HISTORY   has a past surgical history that includes knee arthroscopy (10/2009); dental extraction(s) (); knee arthroscopy (2010); synovectomy (2010); meniscectomy, knee, medial (2010); other (); and  delivery only (N/A, 2020).    CURRENT MEDICATIONS  Home Medications    **Home medications have not yet been reviewed for  this encounter**         ALLERGIES  Allergies   Allergen Reactions   • Codeine    • Hydrocodone Itching   • Naprosyn [Naproxen] Hives   • Zoloft      Left hand swelling   • Fentanyl Photosensitivity     Reports blurred vision        PHYSICAL EXAM  There were no vitals filed for this visit.    Physical Exam  Constitutional:       Appearance: She is well-developed.   HENT:      Head: Normocephalic and atraumatic.   Eyes:      Conjunctiva/sclera: Conjunctivae normal.   Cardiovascular:      Rate and Rhythm: Normal rate and regular rhythm.   Pulmonary:      Effort: Pulmonary effort is normal.      Breath sounds: Normal breath sounds.   Abdominal:      General: Bowel sounds are normal. There is no distension.      Palpations: Abdomen is soft.      Tenderness: There is no abdominal tenderness. There is no rebound.   Musculoskeletal:      Cervical back: Normal range of motion and neck supple.   Skin:     General: Skin is warm and dry.      Findings: No rash.   Neurological:      Mental Status: She is alert and oriented to person, place, and time.      Comments: Strength in bilateral upper and lower extremities is 5 out of 5.  No associated dysmetria.  No associated visual field deficit.  Patient is alert and oriented.  Patient without any word finding difficulties.  No dysarthria.  No evidence of aphasia.  Patient with extraocular motions intact.  She reports decreased sensation along the ulnar aspect of her left arm, while she reports she has complete intact sensation of her left radial aspect, thumb and index finger.  She reports decreased sensation of her inner thigh, while she reports her outer thigh on her left is normal.  Patient also reports decreased sensation on left jaw.  She does not have any associated neck lacked.  Right side is unremarkable with sensation.     Psychiatric:         Behavior: Behavior normal.           DIAGNOSTIC STUDIES / PROCEDURES    EKG  EKG is normal sinus rhythm, normal intervals, poor R  wave progression precordial leads which is chronic,    LABS  Results for orders placed or performed during the hospital encounter of 08/05/22   CBC WITH DIFFERENTIAL   Result Value Ref Range    WBC 7.1 4.8 - 10.8 K/uL    RBC 4.41 4.20 - 5.40 M/uL    Hemoglobin 13.9 12.0 - 16.0 g/dL    Hematocrit 39.5 37.0 - 47.0 %    MCV 89.6 81.4 - 97.8 fL    MCH 31.5 27.0 - 33.0 pg    MCHC 35.2 (H) 33.6 - 35.0 g/dL    RDW 41.8 35.9 - 50.0 fL    Platelet Count 193 164 - 446 K/uL    MPV 10.0 9.0 - 12.9 fL    Neutrophils-Polys 49.70 44.00 - 72.00 %    Lymphocytes 38.50 22.00 - 41.00 %    Monocytes 9.70 0.00 - 13.40 %    Eosinophils 1.10 0.00 - 6.90 %    Basophils 0.70 0.00 - 1.80 %    Immature Granulocytes 0.30 0.00 - 0.90 %    Nucleated RBC 0.00 /100 WBC    Neutrophils (Absolute) 3.55 2.00 - 7.15 K/uL    Lymphs (Absolute) 2.75 1.00 - 4.80 K/uL    Monos (Absolute) 0.69 0.00 - 0.85 K/uL    Eos (Absolute) 0.08 0.00 - 0.51 K/uL    Baso (Absolute) 0.05 0.00 - 0.12 K/uL    Immature Granulocytes (abs) 0.02 0.00 - 0.11 K/uL    NRBC (Absolute) 0.00 K/uL   COMP METABOLIC PANEL   Result Value Ref Range    Sodium 138 135 - 145 mmol/L    Potassium 3.3 (L) 3.6 - 5.5 mmol/L    Chloride 104 96 - 112 mmol/L    Co2 21 20 - 33 mmol/L    Anion Gap 13.0 7.0 - 16.0    Glucose 89 65 - 99 mg/dL    Bun 12 8 - 22 mg/dL    Creatinine 0.71 0.50 - 1.40 mg/dL    Calcium 8.6 8.5 - 10.5 mg/dL    AST(SGOT) 8 (L) 12 - 45 U/L    ALT(SGPT) 6 2 - 50 U/L    Alkaline Phosphatase 54 30 - 99 U/L    Total Bilirubin 0.6 0.1 - 1.5 mg/dL    Albumin 4.5 3.2 - 4.9 g/dL    Total Protein 6.9 6.0 - 8.2 g/dL    Globulin 2.4 1.9 - 3.5 g/dL    A-G Ratio 1.9 g/dL   PROTHROMBIN TIME   Result Value Ref Range    PT 14.8 (H) 12.0 - 14.6 sec    INR 1.18 (H) 0.87 - 1.13   APTT   Result Value Ref Range    APTT 34.4 24.7 - 36.0 sec   COD (ADULT)   Result Value Ref Range    ABO Grouping Only O     Rh Grouping Only POS     Antibody Screen-Cod NEG    TROPONIN   Result Value Ref Range    Troponin  T <6 6 - 19 ng/L   ESTIMATED GFR   Result Value Ref Range    GFR (CKD-EPI) 112 >60 mL/min/1.73 m 2   EKG (NOW)   Result Value Ref Range    Report       [facility] Emergency Dept.    Test Date:  2022  Pt Name:    ELIF CRONIN               Department: ER  MRN:        [id number]                      Room:       [facility]  Gender:     Female                       Technician: [id number]  :        [id number]                   Requested By:CHAPINCITO MORTON  Order #:    [id number]                    Reading MD:    Measurements  Intervals                                Axis  Rate:       62                           P:          -27  TN:         184                          QRS:        -32  QRSD:       106                          T:          -2  QT:         472  QTc:        480    Interpretive Statements  SINUS RHYTHM  BORDERLINE IVCD WITH LAD  LOW VOLTAGE THROUGHOUT  LATE PRECORDIAL R/S TRANSITION  BORDERLINE T ABNORMALITIES, DIFFUSE LEADS  Compared to ECG 10/06/2020 13:54:59  Low QRS voltage now present  T-wave abnormality now present  Atrial premature complex(es) no longer present           RADIOLOGY  DX-CHEST-PORTABLE (1 VIEW)   Final Result      No acute cardiopulmonary process is seen.      CT-CTA NECK WITH & W/O-POST PROCESSING   Final Result      1.  No focal high-grade stenosis, dissection or occlusion of the cervical carotid or vertebral arteries.   2.  Hypoplastic RIGHT vertebral artery.      CT-CTA HEAD WITH & W/O-POST PROCESS   Final Result      1.  No intracranial aneurysm, focal stenosis or abrupt large vessel cut off   2.  Hypoplastic distal RIGHT vertebral artery.      CT-HEAD W/O   Final Result      No acute intracranial abnormality.         MR-BRAIN-WITH & W/O    (Results Pending)           COURSE & MEDICAL DECISION MAKING  Pertinent Labs & Imaging studies reviewed. (See chart for details)    NIH is questionably 1. she has symptoms on the ulnar aspect of her left hand and her medial thigh,  she withdraws from pain in this area; subjectively she has normal sensation on the lateral aspect of her affected side.  She does not have any other associated neurologic sequelae.  I believe a stroke is highly unlikely in this very well-appearing patient.  Vitals are reassuring.  CT head and CTAs checked for further risk stratification though my suspicion remains low in this patient.  Will check EKG and basic labs.  Patient back from CT.  PT fails to reveal any actionable findings and is very reassuring.  On reassessment patient's laterality of her numbness has switched from the medial aspect to the lateral aspect of her thigh.  Patient remains without any motor complaints.  Patient basic labs are all very reassuring.  Patient case discussed with neurology who believes a stroke is highly unlikely.  Patient on another reassessment symptoms have entirely resolved.  We initially discussed checking an MRI but given patient's symptomatology is moving, inconsistent and now resolved the suspicion of stroke is very low.  Patient will be discharged home.  Follow-up primary care.  Return precautions discussed    The patient will not drink alcohol nor drive with prescribed medications. The patient will return for worsening symptoms and is stable at the time of discharge. The patient verbalizes understanding and will comply.    FINAL IMPRESSION    1. Paresthesia               Electronically signed by: Migue Vaca M.D., 8/5/2022 7:26 PM

## 2022-08-06 NOTE — ED TRIAGE NOTES
"Chief Complaint   Patient presents with   • Possible Stroke     Left arm/jaw numbness, LKW 1900     Pt BIB [place] from grocery store. Pt reports she was shopping when she started to experience a panic attack. After her panic attack, she noticed her left arm and jaw felt numb. Stroke alert called.     BP (!) 153/72   Pulse (!) 59   Resp 15   Ht 1.575 m (5' 2\")   Wt 59 kg (130 lb)   SpO2 98%     "

## 2022-08-06 NOTE — ED NOTES
Pt given d/c instructions and follow up with verbal understanding.  VSS at discharge.  PIV d/c'd with tip intact.  Pt ambulatory from the ED w/ steady gait.  All belongings in possession on discharge.  Pt escorted to the lobby by RN.

## 2022-08-06 NOTE — CONSULTS
Neurology STROKE CODE H&P  Neurohospitalist Service, [facility]    Referring Physician: Migue Vaca M.D.    STROKE CODE:   Chief Complaint   Patient presents with   • Possible Stroke     Left arm/jaw numbness, LKW 1900       To obtain the most accurate data regarding the time called, and time patient seen, refer to the stroke run-sheet and chart.  For time of CT, refer to the radiology report. See A&P below for TPA Decision and door to needle time if and when applicable.    HPI: 37-year-old female history of hypothyroidism, incidental syndrome, PTSD, bipolar.  She presents today as a stroke alert due to left upper extremity numbness.  No weakness.  No issues in the leg.  She says it started with severe 10 of 10 pain in the left face.  Mostly in the V3 distribution.  Pain is short lasting seconds.  However still occurring frequently.  Seem to be triggered by talking.  Never had this before.  Is also splinting seen numbness down from the neck into the arm.  No weakness in the arm.  This started approximately 6:30 PM.  Never had a stroke before.    Review of systems: In addition to what is detailed in the HPI above, (and scanned into the chart if and when applicable), all other systems reviewed and are negative.    Past Medical History:    has a past medical history of Arrhythmia, Bipolar affective disorder (HCC), Breath shortness, Connective tissue anomaly, Depression, Gastroparesis, Heart burn, Hypothyroid, Pain, and PTSD (post-traumatic stress disorder).    FHx:  family history includes Diabetes in an other family member; Heart Disease in her mother.    SHx:   reports that she quit smoking about 12 years ago. She has a 0.75 pack-year smoking history. She has never used smokeless tobacco. She reports that she does not drink alcohol and does not use drugs.    Allergies:  Allergies   Allergen Reactions   • Codeine    • Hydrocodone Itching   • Naprosyn [Naproxen] Hives   • Zoloft      Left  "hand swelling   • Fentanyl Photosensitivity     Reports blurred vision        Medications:  No current facility-administered medications for this encounter.    Current Outpatient Medications:   •  traZODone (DESYREL) 100 MG Tab, , Disp: , Rfl:   •  levothyroxine (SYNTHROID) 100 MCG Tab, , Disp: , Rfl:   •  gabapentin (NEURONTIN) 300 MG Cap, , Disp: , Rfl:   •  ziprasidone (GEODON) 40 MG Cap, , Disp: , Rfl:     Physical Examination:    Vitals:    08/05/22 1933   Weight: 59 kg (130 lb)   Height: 1.575 m (5' 2\")       General: Patient is awake and in no acute distress  Eyes: Unremarkable  CV: RRR    NEUROLOGICAL EXAM:     Mental status: Awake, alert and fully oriented, follows commands  Speech and language: speech is clear and fluent. The patient is able to name and repeat.  Cranial nerve exam: Pupils are equal, round and reactive to light bilaterally. Visual fields are full. Extraocular muscles are intact. Sensation in the face is intact to light touch. Face is symmetric. Hearing intact. Palate elevates symmetrically. Shoulder shrug is full. Tongue is midline.  Motor exam: Strength is 5/5 in all extremities both distally and proximally. Tone is normal. No abnormal movements were seen on exam.  Sensory exam: Decree sensation soft touch in the left upper extremity   deep tendon reflexes:  2+ and symmetric. Toes down-going bilaterally.  Coordination: no ataxia   Gait: Normal    NIH Stroke Scale:    1a. Level of Consciousness (Alert, drowsy, etc): 0= Alert    1b. LOC Questions (Month, age): 0= Answers both correctly    1c. LOC Commands (Open/close eyes make fist/let go): 0= Obeys both correctly    2.   Best Gaze (Eyes open - patient follows examiner's finger on face): 0= Normal    3.   Visual Fields (introduce visual stimulus/threat to patient's field quadrants): 0= No visual loss  4.   Facial Paresis (Show teeth, raise eyebrows and squeeze eyes shut): 0= Normal     5a. Motor Arm - Left (Elevate arm to 90 degrees if " patient is sitting, 45 degrees if  supine): 0= No drift    5b. Motor Arm - Right (Elevate arm to 90 degrees if patient is sitting, 45 degrees if supine): 0= No drift    6a. Motor Leg - Left (Elevate leg 30 degrees with patient supine): 0= No drift    6b. Motor Leg - Right  (Elevate leg 30 degrees with patient supine): 0= No drift    7.   Limb Ataxia (Finger-nose, heel down shin): 0= No ataxia    8.   Sensory (Pin prick to face, arm, trunk and leg - compare side to side): 1= Partial loss    9.  Best Language (Name item, describe a picture and read sentences): 0= No aphasia    10. Dysarthria (Evaluate speech clarity by patient repeating listed words): 0= Normal articulation    11. Extinction and Inattention (Use information from prior testing to identify neglect or  double simultaneous stimuli testing): 0= No neglect    Total NIH Score: 1    Modified [place] Scale (MRS): 0 = No symptoms      Objective Data:    Labs:  Lab Results   Component Value Date/Time    PROTHROMBTM 15.1 (H) 11/30/2011 12:20 PM    INR 1.30 (H) 11/30/2011 12:20 PM      Lab Results   Component Value Date/Time    WBC 7.1 08/05/2022 07:09 PM    RBC 4.41 08/05/2022 07:09 PM    HEMOGLOBIN 13.9 08/05/2022 07:09 PM    HEMATOCRIT 39.5 08/05/2022 07:09 PM    MCV 89.6 08/05/2022 07:09 PM    MCH 31.5 08/05/2022 07:09 PM    MCHC 35.2 (H) 08/05/2022 07:09 PM    MPV 10.0 08/05/2022 07:09 PM    NEUTSPOLYS 49.70 08/05/2022 07:09 PM    LYMPHOCYTES 38.50 08/05/2022 07:09 PM    MONOCYTES 9.70 08/05/2022 07:09 PM    EOSINOPHILS 1.10 08/05/2022 07:09 PM    BASOPHILS 0.70 08/05/2022 07:09 PM      Lab Results   Component Value Date/Time    SODIUM 139 07/31/2022 12:05 PM    POTASSIUM 3.4 (L) 07/31/2022 12:05 PM    CHLORIDE 106 07/31/2022 12:05 PM    CO2 20 07/31/2022 12:05 PM    GLUCOSE 98 07/31/2022 12:05 PM    BUN 9 07/31/2022 12:05 PM    CREATININE 0.60 07/31/2022 12:05 PM    CREATININE 0.6 06/28/2007 08:00 PM      Lab Results   Component Value Date/Time    CHOLSTRLTOT  219 (H) 08/08/2019 11:19 AM     (H) 08/08/2019 11:19 AM    HDL 45 08/08/2019 11:19 AM    TRIGLYCERIDE 116 08/08/2019 11:19 AM       Lab Results   Component Value Date/Time    ALKPHOSPHAT 57 07/31/2022 12:05 PM    ASTSGOT 12 07/31/2022 12:05 PM    ALTSGPT 8 07/31/2022 12:05 PM    TBILIRUBIN 0.5 07/31/2022 12:05 PM        Imaging/Testing:  CT-CTA NECK WITH & W/O-POST PROCESSING   Final Result      1.  No focal high-grade stenosis, dissection or occlusion of the cervical carotid or vertebral arteries.   2.  Hypoplastic RIGHT vertebral artery.      CT-CTA HEAD WITH & W/O-POST PROCESS   Final Result      1.  No intracranial aneurysm, focal stenosis or abrupt large vessel cut off   2.  Hypoplastic distal RIGHT vertebral artery.      CT-HEAD W/O   Final Result      No acute intracranial abnormality.         DX-CHEST-PORTABLE (1 VIEW)    (Results Pending)     No tPA due to nondisabling symptoms.  Also questionable if this is a stroke given the jaw pain.    Assessment and Plan:    37-year-old female presents with left jaw pain that is short duration but occurring frequently 7 be triggered by talking.  Also left upper extremity numbness.  Did not recommend tPA at this time given the low stroke scale with nondisabling symptoms.  Also given his jaw pain and not sure this is even a stroke.  The jaw pain does sound suspicious for trigeminal neuralgia.  We recommend getting an MRI brain with without contrast rule out a neuroma.  If the MRI brain is unremarkable patient can be discharged home follow-up in neurology clinic.        The evaluation of the patient, and recommended management, was discussed with the resident staff.     This chart was partially generated using voice recognition technology and sound alike word replacement may be present, best efforts were made to make the chart accurate.    Jamaal Sen MD  Board Certified Neurology, ABPN  (t) [phone]

## 2022-08-19 ENCOUNTER — APPOINTMENT (OUTPATIENT)
Dept: MEDICAL GROUP | Facility: CLINIC | Age: 37
End: 2022-08-19
Payer: MEDICARE

## 2022-09-20 ENCOUNTER — TELEPHONE (OUTPATIENT)
Dept: OBGYN | Facility: CLINIC | Age: 37
End: 2022-09-20
Payer: MEDICARE

## 2022-09-20 NOTE — TELEPHONE ENCOUNTER
Pt called stating she thinks she may be pregnant.  Pt stated she had her nexplanon taken out 12/2021. Stated she has been having sporadic periods since the implant removal but has been tracking her periods. Stated her LMP was 09/01/2022. Pt stated she is having pregnancy symptoms like breast soreness, little nausea, and lower back pain. Pt wants to know when will be a good time to do a home pregnancy test. I informed pt that ideally the best time to do a home pregnancy test is between 4 -5 weeks after the first day of her last menstrual cycle. Explained that right it can be a little early for some pregnancy tests to detect the pregnancy hormones. Pt verbalized understanding. No further questions.

## 2022-09-21 ENCOUNTER — OFFICE VISIT (OUTPATIENT)
Dept: BEHAVIORAL HEALTH | Facility: CLINIC | Age: 37
End: 2022-09-21
Payer: MEDICARE

## 2022-09-21 VITALS — HEIGHT: 62 IN | WEIGHT: 130 LBS | BODY MASS INDEX: 23.92 KG/M2

## 2022-09-21 DIAGNOSIS — F63.81 INTERMITTENT EXPLOSIVE DISORDER: ICD-10-CM

## 2022-09-21 DIAGNOSIS — Z79.899 ENCOUNTER FOR LONG-TERM (CURRENT) USE OF MEDICATIONS: ICD-10-CM

## 2022-09-21 DIAGNOSIS — F33.41 MDD (MAJOR DEPRESSIVE DISORDER), RECURRENT, IN PARTIAL REMISSION (HCC): ICD-10-CM

## 2022-09-21 PROBLEM — Z31.69 PRE-CONCEPTION COUNSELING: Status: RESOLVED | Noted: 2021-10-15 | Resolved: 2022-09-21

## 2022-09-21 PROBLEM — Z30.46 ENCOUNTER FOR SURVEILLANCE OF NEXPLANON SUBDERMAL CONTRACEPTIVE: Status: RESOLVED | Noted: 2021-10-07 | Resolved: 2022-09-21

## 2022-09-21 PROBLEM — R11.2 NAUSEA AND VOMITING: Status: RESOLVED | Noted: 2022-04-25 | Resolved: 2022-09-21

## 2022-09-21 PROBLEM — F31.81 BIPOLAR II DISORDER (HCC): Status: RESOLVED | Noted: 2019-07-18 | Resolved: 2022-09-21

## 2022-09-21 PROBLEM — F32.9 DEPRESSION, MAJOR: Status: RESOLVED | Noted: 2021-01-12 | Resolved: 2022-09-21

## 2022-09-21 PROBLEM — F31.9 BIPOLAR AFFECTIVE DISORDER (HCC): Status: RESOLVED | Noted: 2021-03-11 | Resolved: 2022-09-21

## 2022-09-21 PROBLEM — N91.2 AMENORRHEA: Status: RESOLVED | Noted: 2021-06-01 | Resolved: 2022-09-21

## 2022-09-21 PROBLEM — H66.90 AOM (ACUTE OTITIS MEDIA): Status: RESOLVED | Noted: 2021-10-13 | Resolved: 2022-09-21

## 2022-09-21 PROBLEM — Z30.09 GENERAL COUNSELING AND ADVICE FOR CONTRACEPTIVE MANAGEMENT: Status: RESOLVED | Noted: 2021-01-13 | Resolved: 2022-09-21

## 2022-09-21 PROBLEM — F41.9 ANXIETY: Status: RESOLVED | Noted: 2018-12-05 | Resolved: 2022-09-21

## 2022-09-21 PROCEDURE — 99205 OFFICE O/P NEW HI 60 MIN: CPT | Performed by: PSYCHIATRY & NEUROLOGY

## 2022-09-21 ASSESSMENT — ANXIETY QUESTIONNAIRES
GAD7 TOTAL SCORE: 3
3. WORRYING TOO MUCH ABOUT DIFFERENT THINGS: NOT AT ALL
1. FEELING NERVOUS, ANXIOUS, OR ON EDGE: NOT AT ALL
2. NOT BEING ABLE TO STOP OR CONTROL WORRYING: NOT AT ALL
6. BECOMING EASILY ANNOYED OR IRRITABLE: NEARLY EVERY DAY
5. BEING SO RESTLESS THAT IT IS HARD TO SIT STILL: NOT AT ALL
IF YOU CHECKED OFF ANY PROBLEMS ON THIS QUESTIONNAIRE, HOW DIFFICULT HAVE THESE PROBLEMS MADE IT FOR YOU TO DO YOUR WORK, TAKE CARE OF THINGS AT HOME, OR GET ALONG WITH OTHER PEOPLE: SOMEWHAT DIFFICULT
4. TROUBLE RELAXING: NOT AT ALL
7. FEELING AFRAID AS IF SOMETHING AWFUL MIGHT HAPPEN: NOT AT ALL

## 2022-09-21 ASSESSMENT — PATIENT HEALTH QUESTIONNAIRE - PHQ9
CLINICAL INTERPRETATION OF PHQ2 SCORE: 2
SUM OF ALL RESPONSES TO PHQ QUESTIONS 1-9: 13
5. POOR APPETITE OR OVEREATING: 0 - NOT AT ALL

## 2022-09-21 ASSESSMENT — FIBROSIS 4 INDEX: FIB4 SCORE: 0.63

## 2022-09-21 NOTE — PROGRESS NOTES
"INITIAL PSYCHIATRY EVALUATION      Chief Complaint   Patient presents with    Establish Care     Transferring care from AIDA Humphries at [facility], history of bipolar mood disorder       History Of Present Illness:  Kristen Luna is a 37 y.o. female with history of bipolar mood disorder, hypothyroidism, Sandra-Danlos Syndrome type III, idiopathic gastroparesis comes in today to establish care.  She has been seeing  AIDA Humphries for about a year and her last visit with her was in June.  She reports getting diagnosed with bipolar mood disorder in her 20s when her parents were going through a divorce and has tried several medications since then.  She does not like to take antidepressants as they \"put me in a tailspin\".  She has been on Geodon for about 3 years and endorses benefit on mood stabilization.  She describes her hypomania/vicky as shopping for herself or her daughter that lasted for 1 to 2 days.  She denies any other hypomania/vicky symptoms including racing thoughts, decreased need for sleep, elevated energy, easy distractibility, sexual promiscuity, hypersexuality, other impulsive decision making, excessive use of alcohol or illicit drugs of Seroquel.  She never spends more money than she has.  She struggles more with depression and does feel that shopping helps improve mood.  She describes her depression as low mood, isolation, anhedonia, sleeping a lot, lack of motivation, low energy that can last for 2 to 3 weeks.  She has a history of suicidal thoughts in the past but denies any current thoughts of wanting to hurt herself or anyone else.  She had her first baby in December 2020 and did experience postpartum depression and she feels went unrecognized.  She is actively trying to get pregnant and feels that she might be pregnant at this time.  She is on a combination of Geodon and Neurontin during pregnancy and she did well.  She mentions that her daughter is healthy and " there were no complications during pregnancy.  She does report struggling with explosive anger to the point where she throws things and can get physically violent with her .  She denies hitting her daughter when she gets angry.  She is does feel that anger is out of proportion to the situation most of the times.  She denies having mood swings.  She denies struggles with anxiety disorder or psychotic disorder.  She denies a history of trauma as a child or as an adult.  She denies side effects from her current psychotropic medication regimen.    Current psychiatric medications - Geodon 40 mg at bedtime, Neurontin 300 mg in the morning and 600 mg at bedtime, Trazodone 100 mg at bedtime    Past Psychiatric History:  She reports 1 hospitalization in her early 20s at [facility] for suicidal ideations which is when she got diagnosed with bipolar mood disorder.  She had an ER visit the ER after hospitalization for suicidal ideations when she was discharged.  Previous medication trials (per patient and chart review) - Zoloft (drug allergy), Depakote (ineffective, s/e - anxiety), Lamictal, Zyprexa (effective, s/e - weight gain), Seroquel (ineffective).  She has tried several other medications which she is unable to recall at this time.     Current Safety/Relapse Assessment:       Suicidal: Low       Homicidal: Low       Self-Harm: Low       Relapse: Not applicable       Crisis Safety Plan: Not Indicated    Past Medical/Surgical History:  Past Medical History:   Diagnosis Date    Arrhythmia     SVT no episodes since     Bipolar affective disorder (HCC)     Breath shortness     at noc [id number]; primary MD aware    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    Depression     Gastroparesis     Heart burn     Hypothyroid     Pain     Left knee 5/10    PTSD (post-traumatic stress disorder)      Past Surgical History:   Procedure Laterality Date    SD  DELIVERY ONLY N/A 2020    Procedure:   SECTION, PRIMARY;  Surgeon: Malinda Murdock M.D.;  Location: LABOR AND DELIVERY;  Service: Labor and Delivery    OTHER  2018    laparoscopic cholecystectomy 2018    KNEE ARTHROSCOPY  2010    Performed by KRYSTIAN COSTA at SURGERY [facility]    SYNOVECTOMY  2010    Performed by KRYSTIAN COSTA at SURGERY [facility] ORS    MENISCECTOMY, KNEE, MEDIAL  2010    Performed by KRYSTIAN COSTA at SURGERY [facility] ORS    KNEE ARTHROSCOPY  10/2009    left    DENTAL EXTRACTION(S)  2007    wisdom teeth       Family Psychiatric History:  Maternal aunt - bipolar mood disorder  Mother - depression    Substance Use/Addiction History:  Alcohol - Denies   Nicotine - Denies, former smoker  Cannabis - Smokes once a month  Illicit drugs - Denies     Social History:  She is engaged, in relationship for 3.5 years, kate has schizophrenia and is on disability for same, 1.5 year old daughter, 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    Allergies:  Codeine, Hydrocodone, Naprosyn [naproxen], Zoloft, and Fentanyl    Medications:  Current Outpatient Medications   Medication Sig Dispense Refill    ASPIRIN 81 PO Take  by mouth every day.      Prenatal Multivit-Min-Fe-FA (PRE- FORMULA PO) Take  by mouth.      traZODone (DESYREL) 100 MG Tab Take 100 mg by mouth every evening.      levothyroxine (SYNTHROID) 100 MCG Tab       gabapentin (NEURONTIN) 300 MG Cap Takes 300 mg in the morning and 600 mg at bedtime  Indications: Restless Leg Syndrome      ziprasidone (GEODON) 40 MG Cap Take 40 mg by mouth at bedtime.       No current facility-administered medications for this visit.       Review of Symptoms:  Constitutional - Positive for fatigue  Psychiatric - Positive for  "infrequent depression    Physical Examination:  Vital signs: Ht 1.575 m (5' 2\")   Wt 59 kg (130 lb)   LMP 09/01/2022 (Exact Date)   BMI 23.78 kg/m²     Musculoskeletal: Normal gait. No abnormal movements.     Mental Status Evaluation:   General: Young female with mask, dressed in casual attire, good grooming and hygiene, in no apparent distress, calm and cooperative, good eye contact, no psychomotor agitation or retardation  Orientation: Alert and oriented to person, place and time  Recent and remote memory: Intact  Attention span and concentration: Intact  Speech: Spontaneous, normal rate, rhythm and tone  Thought Process: Linear, logical and goal directed  Thought Content: Denies suicidal or homicidal ideations, intent or plan  Perception: Denies auditory or visual hallucinations. No delusions noted  Associations: Intact  Language: Appropriate  Fund of knowledge and vocabulary: Adequate  Mood: \"good\"  Affect: Euthymic, mood congruent  Insight: Good  Judgment: Good    Depression screening:  Depression Screen (PHQ-2/PHQ-9) 12/9/2020 12/9/2020 9/21/2022   PHQ-2 Total Score 0 0 -   PHQ-2 Total Score - - 2   PHQ-9 Total Score - - 13     Interpretation of PHQ-9 Total Score   Score Severity   1-4 No Depression   5-9 Mild Depression   10-14 Moderate Depression   15-19 Moderately Severe Depression   20-27 Severe Depression    Anxiety screening:  PARISH 7 9/21/2022   PARISH-7 Total Score 3     Interpretation of PARISH 7 Total Score   Score Severity:  0-4 No Anxiety   5-9 Mild Anxiety  10-14 Moderate Anxiety  15-21 Severe Anxiety    Medical Records/Labs/Diagnostic Tests [facility]ed[facility]  [facility] in the last 2 years    Impression:  Young female with history of bipolar mood disorder who has been stable on a combination of Geodon, Neurontin and  Trazodone and regular psychotherapy. Her symptoms are not consistent with bipolar mood disorder but with major depressive disorder and intermittent explosive " disorder.  She is actively trying to get pregnant and has a history of postpartum depression after her first pregnancy.      1.  Major depressive disorder, recurrent, in partial remission - stable  2.  Intermittent explosive disorder - stable  3.  Long term use of medications - Geodon  4.  History of bipolar mood disorder and post partum depression    Plan:  1.  Continue Geodon 40 mg at bedtime for mood stabilization  -Metabolic monitoring: A1c and lipid profile ordered today  2.  Continue Neurontin 300 mg in the morning and 300 mg at bedtime for mood stabilization and RLS  3.  Continue Trazodone 100 mg at bedtime for sleep  4.  She is actively trying to get pregnant and was on Geodon and Neurontin during her first pregnancy and feels comfortable continuing both those medications if she ends up getting pregnant.  She is thinking about tapering herself off Trazodone if she gets pregnant.  She does report postpartum depression after first pregnancy which increases chances of depression during this pregnancy and postpartum period.  Discussed risks versus benefits of continuing psychotropic medications during pregnancy and given history of postpartum depression, she would benefit from staying on psychotropic medications.  I have encouraged her to focus on self-care, utilizing support from family and friends and to continue seeing her therapist on a twice weekly basis through the pregnancy.  5.  Continue bi weekly individual psychotherapy with Kristen Dyer at [facility]  6.  She would like to avoid antidepressants as she has a history of not doing good on them in the past    Return to clinic in 3 months or sooner if symptoms worsen    The proposed treatment plan was discussed with the patient who was provided the opportunity to ask questions and make suggestions regarding alternative treatment. Patient verbalized understanding and expressed agreement with the plan.     Total time spent on the day of encounter: 70  minutes.  Reviewing patient's chart, talking to the patient, discussing major depressive disorder, intermittent explosive disorder, bipolar mood disorder and postpartum depression, management options, documenting clinical information.    Socorro Nance M.D.  09/21/22    This note was created using voice recognition software (Dragon). The accuracy of the dictation is limited by the abilities of the software. I have reviewed the note prior to signing, however some errors in grammar and context are still possible. If you have any questions related to this note please do not hesitate to contact our office.

## 2022-11-11 ENCOUNTER — OFFICE VISIT (OUTPATIENT)
Dept: MEDICAL GROUP | Facility: CLINIC | Age: 37
End: 2022-11-11
Payer: MEDICARE

## 2022-11-11 ENCOUNTER — PATIENT MESSAGE (OUTPATIENT)
Dept: HEALTH INFORMATION MANAGEMENT | Facility: OTHER | Age: 37
End: 2022-11-11

## 2022-11-11 ENCOUNTER — HOSPITAL ENCOUNTER (OUTPATIENT)
Facility: MEDICAL CENTER | Age: 37
End: 2022-11-11
Attending: STUDENT IN AN ORGANIZED HEALTH CARE EDUCATION/TRAINING PROGRAM
Payer: MEDICARE

## 2022-11-11 VITALS
HEIGHT: 63 IN | OXYGEN SATURATION: 98 % | BODY MASS INDEX: 21.26 KG/M2 | WEIGHT: 120 LBS | SYSTOLIC BLOOD PRESSURE: 135 MMHG | HEART RATE: 74 BPM | DIASTOLIC BLOOD PRESSURE: 84 MMHG

## 2022-11-11 DIAGNOSIS — Z3A.10 10 WEEKS GESTATION OF PREGNANCY: ICD-10-CM

## 2022-11-11 DIAGNOSIS — N91.2 AMENORRHEA: ICD-10-CM

## 2022-11-11 LAB
INT CON NEG: NORMAL
INT CON POS: NORMAL
POC URINE PREGNANCY TEST: POSITIVE

## 2022-11-11 PROCEDURE — 81025 URINE PREGNANCY TEST: CPT | Performed by: FAMILY MEDICINE

## 2022-11-11 PROCEDURE — 99213 OFFICE O/P EST LOW 20 MIN: CPT | Mod: GE | Performed by: FAMILY MEDICINE

## 2022-11-11 PROCEDURE — 0240U HCHG SARS-COV-2 COVID-19 NFCT DS RESP RNA 3 TRGT MIC: CPT

## 2022-11-11 ASSESSMENT — FIBROSIS 4 INDEX: FIB4 SCORE: 0.63

## 2022-11-11 NOTE — ASSESSMENT & PLAN NOTE
Acute.  Patient presents with concerns about previous potential miscarriage.  She states that she passed several large blood clots at the end of July and was seen at [facility] for this concern.  They did a pregnancy test at that time which was negative.  Patient was provided reassurance, but still had concerns about event.  Looked at patient's menstrual cycle tracker and given data, it is unlikely she had a miscarriage and it is more likely that she was just passing additional blood from her previous period.  No history of other miscarriages.  Today she is pregnant and per her LMP her gestational age is 10 weeks 2 days.  She has an appointment with Dr. Murdock for prenatal care.  Provided patient with initial prenatal labs.  No concerning symptoms at today's visit including vaginal bleeding, spotting, or contractions.  Plan:  -Continue prenatal care with Dr. Murdock  -Continue prenatal vitamin  -Follow-up on initial prenatal labs

## 2022-11-11 NOTE — PROGRESS NOTES
[facility]    No chief complaint on file.      HISTORY OF PRESENT ILLNESS: Patient is a 37 y.o. female established patient who presents today to discuss the medical issues below.    #Andre removed December of last year   -No contraceptive methods since then   -From  to July, patient states that she had a regular period   -End of July, she passed significant amount of blood clots   -13 hours between two blood clots, both large blood clots   -She presented to the [facility] ER for concerns for a miscarriage   -She had a beta-HCG level at the ER which was negative   -Also presented to Dr. Murdock for this same problem and patient was not given an answer   -Last period was 2022 (LMP is )   -, no history of miscarriages       Problem   10 Weeks Gestation of Pregnancy       Patient Active Problem List    Diagnosis Date Noted    10 weeks gestation of pregnancy 2022    MDD (major depressive disorder), recurrent, in partial remission (HCC) 2022    Intermittent explosive disorder 2022    Galactorrhea 2022    Breast pain 2022    Abdominal pain 2021    Sandra-Danlos syndrome Type III 2020    Gastroparesis 2018    Hypothyroidism 2018       Allergies:Codeine, Hydrocodone, Naprosyn [naproxen], Zoloft, and Fentanyl    Current Outpatient Medications   Medication Sig Dispense Refill    Prenatal Multivit-Min-Fe-FA (PRE-ANKUR FORMULA PO) Take  by mouth.      levothyroxine (SYNTHROID) 100 MCG Tab       gabapentin (NEURONTIN) 300 MG Cap Takes 300 mg in the morning and 600 mg at bedtime  Indications: Restless Leg Syndrome      ziprasidone (GEODON) 40 MG Cap Take 1 Capsule by mouth at bedtime.       No current facility-administered medications for this visit.         Past Medical History:   Diagnosis Date    Arrhythmia     SVT no episodes since     Bipolar affective disorder (HCC)     Breath shortness     at noc [id number]; primary MD aware    Connective tissue  "anomaly     Sandra-danlos disease type 3    Depression     Gastroparesis     Heart burn     Hypothyroid     Pain     Left knee 5/10    PTSD (post-traumatic stress disorder)        Social History     Tobacco Use    Smoking status: Former     Packs/day: 0.50     Years: 1.50     Pack years: 0.75     Types: Cigarettes     Quit date: 2009     Years since quittin.2    Smokeless tobacco: Never    Tobacco comments:     one pack every 3months   Vaping Use    Vaping Use: Never used   Substance Use Topics    Alcohol use: No    Drug use: Not Currently     Types: Marijuana, Inhaled     Comment: once a month       Family Status   Relation Name Status    Mo  Alive    Fa  Alive    Bro older, [place] Alive    MAunt  Alive    Main  Alive    hsis older Alive    hsis older Alive    OTHER  (Not Specified)     Family History   Problem Relation Age of Onset    Depression Mother     Heart Disease Mother     Bipolar disorder Maternal Aunt     Diabetes Other        ROS:  Negative except as stated above.       Exam:    /84 (BP Location: Left arm, Patient Position: Sitting, BP Cuff Size: Small adult)   Pulse 74   Ht 1.6 m (5' 3\")   Wt 54.4 kg (120 lb)   SpO2 98%  Body mass index is 21.26 kg/m².  General:  Well nourished, well developed female in NAD.  HENT: Normocephalic, bilateral TMs are intact, nasal and oral mucosa with no lesions,   Neck: Supple without bruit. Thyroid is not enlarged, no nodules palpated.  Pulmonary: Clear to ausculation.  Normal effort. No rales, rhonchi, or wheezing.  Cardiovascular: Regular rate and rhythm without murmur.   Abdomen: Normal bowel sounds, soft and nontender, no palpable liver, spleen, or masses.  Extremities: No LE edema noted. 5/5 strength in all extremities  Neuro: Grossly nonfocal.  Psych: Alert and oriented to person, place, and time. Appropriate mood and conversation.              Assessment/Plan:    1. Amenorrhea  POCT Pregnancy      2. 10 weeks gestation of pregnancy  CBC WITHOUT " DIFFERENTIAL    RUBELLA ABS IGG    HEP B SURFACE AB    HEP C VIRUS ANTIBODY    HIV AG/AB COMBO ASSAY SCREENING    RPR (SYPHILIS)    Chlamydia/GC, PCR (Urine)    URINE CULTURE(NEW)    US-OB LIMITED TRANSABDOMINAL    ABO AND RH DETERMINATION    CANCELED: ABO AND RH DETERMINATION          Orders Placed This Encounter    US-OB LIMITED TRANSABDOMINAL    CBC WITHOUT DIFFERENTIAL    RUBELLA ABS IGG    HEP B SURFACE AB    HEP C VIRUS ANTIBODY    HIV AG/AB COMBO ASSAY SCREENING    RPR (SYPHILIS)    Chlamydia/GC, PCR (Urine)    URINE CULTURE(NEW)    ABO AND RH DETERMINATION    POCT Pregnancy       10 weeks gestation of pregnancy  Acute.  Patient presents with concerns about previous potential miscarriage.  She states that she passed several large blood clots at the end of July and was seen at [facility] for this concern.  They did a pregnancy test at that time which was negative.  Patient was provided reassurance, but still had concerns about event.  Looked at patient's menstrual cycle tracker and given data, it is unlikely she had a miscarriage and it is more likely that she was just passing additional blood from her previous period.  No history of other miscarriages.  Today she is pregnant and per her LMP her gestational age is 10 weeks 2 days.  She has an appointment with Dr. Murdock for prenatal care.  Provided patient with initial prenatal labs.  No concerning symptoms at today's visit including vaginal bleeding, spotting, or contractions.  Plan:  -Continue prenatal care with Dr. Murdock  -Continue prenatal vitamin  -Follow-up on initial prenatal labs      Followup: No follow-ups on file.     Royce Madrid MD   [facility]   PGY-3

## 2022-11-23 ENCOUNTER — HOSPITAL ENCOUNTER (OUTPATIENT)
Dept: LAB | Facility: MEDICAL CENTER | Age: 37
End: 2022-11-23
Attending: STUDENT IN AN ORGANIZED HEALTH CARE EDUCATION/TRAINING PROGRAM
Payer: MEDICARE

## 2022-11-23 ENCOUNTER — APPOINTMENT (OUTPATIENT)
Dept: RADIOLOGY | Facility: MEDICAL CENTER | Age: 37
End: 2022-11-23
Attending: STUDENT IN AN ORGANIZED HEALTH CARE EDUCATION/TRAINING PROGRAM
Payer: MEDICARE

## 2022-11-23 ENCOUNTER — HOSPITAL ENCOUNTER (OUTPATIENT)
Dept: LAB | Facility: MEDICAL CENTER | Age: 37
End: 2022-11-23
Attending: PSYCHIATRY & NEUROLOGY
Payer: MEDICARE

## 2022-11-23 DIAGNOSIS — Z79.899 ENCOUNTER FOR LONG-TERM (CURRENT) USE OF MEDICATIONS: ICD-10-CM

## 2022-11-23 DIAGNOSIS — Z3A.10 10 WEEKS GESTATION OF PREGNANCY: ICD-10-CM

## 2022-11-23 DIAGNOSIS — N64.3 GALACTORRHEA: ICD-10-CM

## 2022-11-23 DIAGNOSIS — E03.9 HYPOTHYROIDISM, UNSPECIFIED TYPE: ICD-10-CM

## 2022-11-23 LAB
CANCELLED TEST NOTIFICATION CANTE: NORMAL
CANCELLED TEST NOTIFICATION CANTE: NORMAL
SPECIMEN SOURCE: NORMAL

## 2022-11-23 PROCEDURE — 76801 OB US < 14 WKS SINGLE FETUS: CPT

## 2022-11-28 ENCOUNTER — INITIAL PRENATAL (OUTPATIENT)
Dept: OBGYN | Facility: CLINIC | Age: 37
End: 2022-11-28
Payer: MEDICARE

## 2022-11-28 ENCOUNTER — HOSPITAL ENCOUNTER (OUTPATIENT)
Facility: MEDICAL CENTER | Age: 37
End: 2022-11-28
Attending: OBSTETRICS & GYNECOLOGY
Payer: MEDICARE

## 2022-11-28 ENCOUNTER — GYNECOLOGY VISIT (OUTPATIENT)
Dept: OBGYN | Facility: CLINIC | Age: 37
End: 2022-11-28
Payer: MEDICARE

## 2022-11-28 VITALS — SYSTOLIC BLOOD PRESSURE: 112 MMHG | WEIGHT: 118 LBS | BODY MASS INDEX: 20.9 KG/M2 | DIASTOLIC BLOOD PRESSURE: 70 MMHG

## 2022-11-28 DIAGNOSIS — Z34.91 INITIAL OBSTETRIC VISIT IN FIRST TRIMESTER: ICD-10-CM

## 2022-11-28 DIAGNOSIS — O09.91 PREGNANCY, SUPERVISION, HIGH-RISK, FIRST TRIMESTER: ICD-10-CM

## 2022-11-28 PROCEDURE — 0500F INITIAL PRENATAL CARE VISIT: CPT | Performed by: OBSTETRICS & GYNECOLOGY

## 2022-11-28 ASSESSMENT — FIBROSIS 4 INDEX: FIB4 SCORE: 0.63

## 2022-11-28 NOTE — PROGRESS NOTES
Cc: New OB visit    HPI:  The patient is a 37 y.o.  12w4d by LMP  Patient's last menstrual period was 2022 (exact date). This would make EDC 2023. She is am existing patient pf mine. I cared for her in her previous pregnancy that was complicated by bipolar d/o and Sandra-danlos syndrome. She delivered via c/s after failed induction. Her daughter is now almost 3 y/o and doing great!    She has been seen by her PCP and had urine ept as well as US done through radiology last week that confirmed live IUP and EDC 2023.     She presents for her new obstetric visit.  Currently feels well.  FOB: same as previous and involved  She  denies h/o oral or genital HSV herself or her sexual partner.     denies personal or FOB family history of genetic abnormalities, congenital abnormalities or mental retardation with the exception of her Sandra-Danlos and there is some question of mental delay in her.   reports personal history of chickenpox or varicella immunization.      ROS:  gen: denies general concerns  CV/resp: denies history of cardiac/respiratory issues.  abd: reports nausea but denies vomiting.  Denies abd pain.  GYN:denies fetal movement, denies vaginal bleeding, denies leakage of fluid, denies contractions.           OB History    Para Term  AB Living   2 1 1     1   SAB IAB Ectopic Molar Multiple Live Births           0 1      # Outcome Date GA Lbr Ernesto/2nd Weight Sex Delivery Anes PTL Lv   2 Current            1 Term 20 40w1d  8 lb 6.4 oz F CS-LTranv Spinal N DAREK      Complications: Failure to Progress in First Stage       GYNHx:  Denies hx of STIs  Denies hx of abnormal paps, last 2020 normal    Past Medical History:   Diagnosis Date    Arrhythmia     SVT no episodes since     Bipolar affective disorder (HCC)     Breath shortness     at noc [id number]; primary MD aware    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    Depression     Gastroparesis     Heart burn      Hypothyroid     Pain     Left knee 5/10    PTSD (post-traumatic stress disorder)      Past Surgical History:   Procedure Laterality Date    NM  DELIVERY ONLY N/A 2020    Procedure:  SECTION, PRIMARY;  Surgeon: Malinda Murdock M.D.;  Location: LABOR AND DELIVERY;  Service: Labor and Delivery    OTHER      laparoscopic cholecystectomy 2018    KNEE ARTHROSCOPY  2010    Performed by KRYSTIAN COSTA at [facility] ORS    SYNOVECTOMY  2010    Performed by KRYSTIAN COSTA at [facility] ORS    MENISCECTOMY, KNEE, MEDIAL  2010    Performed by KRYSTIAN COSTA at [facility] ORS    KNEE ARTHROSCOPY  10/2009    left    DENTAL EXTRACTION(S)      wisdom teeth     Allergies:   Allergies as of 2022 - Reviewed 2022   Allergen Reaction Noted    Codeine  2011    Hydrocodone Itching 2012    Naprosyn [naproxen] Hives 2009    Zoloft  2018    Fentanyl Photosensitivity 2019     Family History   Problem Relation Age of Onset    Depression Mother     Heart Disease Mother     Bipolar disorder Maternal Aunt     Diabetes Other      Social History     Socioeconomic History    Marital status: Single     Spouse name: Not on file    Number of children: Not on file    Years of education: Not on file    Highest education level: Not on file   Occupational History    Not on file   Tobacco Use    Smoking status: Former     Packs/day: 0.50     Years: 1.50     Pack years: 0.75     Types: Cigarettes     Quit date: 2009     Years since quittin.2    Smokeless tobacco: Never    Tobacco comments:     one pack every 3months   Vaping Use    Vaping Use: Never used   Substance and Sexual Activity    Alcohol use: No    Drug use: Not Currently     Types: Marijuana, Inhaled     Comment: once a month    Sexual activity: Yes     Partners: Male   Other Topics Concern    Not on file   Social History Narrative    Not on file      Social Determinants of Health     Financial Resource Strain: Not on file   Food Insecurity: Not on file   Transportation Needs: Not on file   Physical Activity: Not on file   Stress: Not on file   Social Connections: Not on file   Intimate Partner Violence: Not on file   Housing Stability: Not on file         PE:    /70   Wt 118 lb   General: no acute distress, AA&O  HEENT: Head atraumatic, normocephalic  Neck: supple, no thyromegaly  Lungs: CTAB, no respiratory distress  Abdomen: soft, nondistended, nontender, no CVAT, no suprapubic tenderness  Pelvic: EGBUS WNL; vagina pink and moist, no lesions; Cervix nulliparous, closed, no CMT; uterus 12 week size NSSC, NT; adnexa no masses palpated, nontender to exam  Extremeties: no c/c/e    Pelvic US: (as per my read)  Transabdominal US performed reveals live IUP with CRL=12 2/7 weeks which is c/w LMP dates and confirms EDC 2023  No free fluid identified        A/P:  37 y.o.  12w4d based upon  Patient's last menstrual period was 2022 (exact date). c/w US today.  She is here for her new obstetric visit.    1. Initial obstetric visit in first trimester  Chlamydia/GC, PCR (Urine)    NIPT FOR FETAL ANEUPLOIDY (PANORAMA) WITH MICRODELETIONS    PRENATAL PANEL 3+HIV+HCV    URINE CULTURE(NEW)         - NOB labs rx given  - discussed carrier screening, offered CF/SMA carrier screening as well as indicated hemoglobinopathy screening (secondary to , Mediterranean, , [place]  or West  descent). She believes these were all done with previous pregnancy and we will pull her records to confirm.   - discussed aneuploidy screening vs diagnostic testing, offered first trimester screen  non-invasive prenatal test.  Pt desires NIPT and ordered today. In addition will refer to [facility] for consultation and following secondary to her Sandra Danlos, bipolar disorder and AMA  - NOB packet given with ACOG prenatal book. Advised PNV daily  -  pt is accepting of transfusion in case of emergency  - discussed hospitalist coverage of L&D coverage/shared OB care model.  - she is unsure of TOLAC vs repeat c/s at this time and will discuss further through out her PNC.     F/U 4wks

## 2022-11-29 ENCOUNTER — TELEPHONE (OUTPATIENT)
Dept: OBGYN | Facility: CLINIC | Age: 37
End: 2022-11-29
Payer: MEDICARE

## 2022-11-29 NOTE — TELEPHONE ENCOUNTER
Returing DATAllegro  left on 11/28/2022 @ 1632 regarding needing diagnosis code. Spoke with Papito and states that Pt has Medicare insurance. Papito states that they need a different diagnosis code for GC/Ct test. Advise Papito that I will go ahead and forward the message to the provider's MA. No other further concerns.

## 2022-12-01 ENCOUNTER — HOSPITAL ENCOUNTER (OUTPATIENT)
Dept: LAB | Facility: MEDICAL CENTER | Age: 37
End: 2022-12-01
Attending: OBSTETRICS & GYNECOLOGY
Payer: MEDICARE

## 2022-12-01 DIAGNOSIS — Z34.91 INITIAL OBSTETRIC VISIT IN FIRST TRIMESTER: ICD-10-CM

## 2022-12-01 LAB
ABO GROUP BLD: NORMAL
BLD GP AB SCN SERPL QL: NORMAL
RH BLD: NORMAL

## 2022-12-01 PROCEDURE — 36415 COLL VENOUS BLD VENIPUNCTURE: CPT

## 2022-12-01 PROCEDURE — 86900 BLOOD TYPING SEROLOGIC ABO: CPT

## 2022-12-01 PROCEDURE — 86901 BLOOD TYPING SEROLOGIC RH(D): CPT

## 2022-12-01 PROCEDURE — 87086 URINE CULTURE/COLONY COUNT: CPT

## 2022-12-01 PROCEDURE — 85025 COMPLETE CBC W/AUTO DIFF WBC: CPT

## 2022-12-01 PROCEDURE — 86592 SYPHILIS TEST NON-TREP QUAL: CPT | Mod: GA

## 2022-12-01 PROCEDURE — 86780 TREPONEMA PALLIDUM: CPT | Mod: GA

## 2022-12-01 PROCEDURE — 87340 HEPATITIS B SURFACE AG IA: CPT | Mod: GA

## 2022-12-01 PROCEDURE — 86762 RUBELLA ANTIBODY: CPT

## 2022-12-01 PROCEDURE — 86850 RBC ANTIBODY SCREEN: CPT

## 2022-12-01 PROCEDURE — 86803 HEPATITIS C AB TEST: CPT

## 2022-12-02 LAB
BASOPHILS # BLD AUTO: 0.5 % (ref 0–1.8)
BASOPHILS # BLD: 0.04 K/UL (ref 0–0.12)
EOSINOPHIL # BLD AUTO: 0.04 K/UL (ref 0–0.51)
EOSINOPHIL NFR BLD: 0.5 % (ref 0–6.9)
ERYTHROCYTE [DISTWIDTH] IN BLOOD BY AUTOMATED COUNT: 45.3 FL (ref 35.9–50)
HBV SURFACE AG SER QL: ABNORMAL
HCT VFR BLD AUTO: 40.1 % (ref 37–47)
HCV AB SER QL: ABNORMAL
HGB BLD-MCNC: 13.6 G/DL (ref 12–16)
IMM GRANULOCYTES # BLD AUTO: 0.03 K/UL (ref 0–0.11)
IMM GRANULOCYTES NFR BLD AUTO: 0.3 % (ref 0–0.9)
LYMPHOCYTES # BLD AUTO: 1.77 K/UL (ref 1–4.8)
LYMPHOCYTES NFR BLD: 20 % (ref 22–41)
MCH RBC QN AUTO: 31.5 PG (ref 27–33)
MCHC RBC AUTO-ENTMCNC: 33.9 G/DL (ref 33.6–35)
MCV RBC AUTO: 92.8 FL (ref 81.4–97.8)
MONOCYTES # BLD AUTO: 0.76 K/UL (ref 0–0.85)
MONOCYTES NFR BLD AUTO: 8.6 % (ref 0–13.4)
NEUTROPHILS # BLD AUTO: 6.23 K/UL (ref 2–7.15)
NEUTROPHILS NFR BLD: 70.1 % (ref 44–72)
NRBC # BLD AUTO: 0 K/UL
NRBC BLD-RTO: 0 /100 WBC
PLATELET # BLD AUTO: 199 K/UL (ref 164–446)
PMV BLD AUTO: 11 FL (ref 9–12.9)
RBC # BLD AUTO: 4.32 M/UL (ref 4.2–5.4)
RUBV AB SER QL: 121 IU/ML
T PALLIDUM AB SER QL IA: ABNORMAL
WBC # BLD AUTO: 8.9 K/UL (ref 4.8–10.8)

## 2022-12-03 LAB — TEST NAME 95000: NORMAL

## 2022-12-21 ENCOUNTER — TELEMEDICINE (OUTPATIENT)
Dept: BEHAVIORAL HEALTH | Facility: CLINIC | Age: 37
End: 2022-12-21
Payer: MEDICARE

## 2022-12-21 DIAGNOSIS — Z79.899 ENCOUNTER FOR LONG-TERM (CURRENT) USE OF MEDICATIONS: ICD-10-CM

## 2022-12-21 DIAGNOSIS — Z34.92 SECOND TRIMESTER PREGNANCY: ICD-10-CM

## 2022-12-21 DIAGNOSIS — F63.81 INTERMITTENT EXPLOSIVE DISORDER: ICD-10-CM

## 2022-12-21 DIAGNOSIS — F33.0 MDD (MAJOR DEPRESSIVE DISORDER), RECURRENT EPISODE, MILD (HCC): ICD-10-CM

## 2022-12-21 PROCEDURE — 99214 OFFICE O/P EST MOD 30 MIN: CPT | Mod: 95 | Performed by: PSYCHIATRY & NEUROLOGY

## 2022-12-21 PROCEDURE — 90833 PSYTX W PT W E/M 30 MIN: CPT | Mod: 95 | Performed by: PSYCHIATRY & NEUROLOGY

## 2022-12-21 RX ORDER — ZIPRASIDONE HYDROCHLORIDE 60 MG/1
60 CAPSULE ORAL
Qty: 30 CAPSULE | Refills: 0 | Status: SHIPPED | OUTPATIENT
Start: 2022-12-21 | End: 2023-01-19 | Stop reason: SDUPTHER

## 2022-12-21 NOTE — PROGRESS NOTES
PSYCHIATRY VIRTUAL VISIT FOLLOW-UP NOTE      Chief Complaint   Patient presents with    Follow-Up     mood    Other     15-16 week pregnancy     This evaluation was conducted via Zoom using secure and encrypted videoconferencing technology.   The patient was in their home in the [facility].    The patient's identity was confirmed and verbal consent was obtained for this virtual visit.    History Of Present Illness:  Kristen Luna is a 37 y.o. female with major depressive disorder, intermittent explosive disorder, Sandar-Danlos Syndrome type III, gastroparesis, hypothyroidism comes in today for follow up, was last seen 3 months ago.  She has been having a difficult time with her depression.  She is currently in her second trimester of pregnancy.  She mentions that since her last appointment her fiancé kicked her out of their apartment.  He has schizophrenia decided to stop his medications and has been struggling with his mental health.  They were having arguments and he kicked her out as he was unable to handle the stress.  She is currently living with her mother and stepfather along with her daughter has a custody date set for next month and she is hoping that she can get primary custody.  She is regretting her decision of getting pregnant because the relationship has ended.  However, she has good support from her family.  She is trying to stay busy taking care of her daughter and with her business.  She has been compliant with Geodon but tapered herself off Trazodone when she found out that she is pregnant.  She has noticed difficulties with sleep, energy and appetite through her pregnancy.  She denies any significant outbursts of anger.  She denies having thoughts of wanting to hurt herself.    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    Substance Use:  Alcohol - Denies   Nicotine - Denies  Cannabis - Denies   Illicit drugs - Denies     Past Medication Trials:  Zoloft (drug allergy), Depakote (ineffective, s/e - anxiety), Lamictal, Zyprexa (effective, s/e - weight gain), Seroquel (ineffective), Trazodone 100 mg (effective, discontinued during pregnancy).  She has tried several other medications but is unable to recall them.    Medications:  Current Outpatient Medications   Medication Sig Dispense Refill    Prenatal Multivit-Min-Fe-FA (PRE- FORMULA PO) Take  by mouth.      levothyroxine (SYNTHROID) 100 MCG Tab       gabapentin (NEURONTIN) 300 MG Cap Takes 300 mg in the morning and 600 mg at bedtime  Indications: Restless Leg Syndrome      ziprasidone (GEODON) 40 MG Cap Take 1 Capsule by mouth at bedtime.       No current facility-administered medications for this visit.     Review Of Systems:    Constitutional - Positive for fatigue  Psychiatric - Positive for depression, sleep problems     Physical Examination:  Vital signs: LMP 2022 (Exact Date)     Musculoskeletal: No abnormal movements.     Mental Status Evaluation:   General: Young female, dressed in casual attire, good grooming and hygiene, in no apparent distress, calm and cooperative, good eye contact, no psychomotor agitation or retardation  Orientation: Alert and oriented to person, place and time  Recent and remote memory: Grossly intact  Attention span and concentration: Grossly intact  Speech: Spontaneous, normal rate, rhythm and tone  Thought Process: Linear, logical and goal directed  Thought Content: Denies suicidal or homicidal ideations, intent or plan  Perception: No delusions noted  Associations: Intact  Language: Appropriate  Fund of knowledge and vocabulary: Grossly adequate  Mood:  "\"alright\"  Affect: Dysphoric at times, mood congruent  Insight: Good  Judgment: Good    Depression screening:      10/19/2020 2020 2022   Depression Screen (PHQ-2/PHQ-9)   PHQ-2 Total Score 0 0    0    PHQ-2 Total Score   2   PHQ-9 Total Score   13       Multiple values from one day are sorted in reverse-chronological order     Interpretation of PHQ-9 Total Score   Score Severity   1-4 No Depression   5-9 Mild Depression   10-14 Moderate Depression   15-19 Moderately Severe Depression   20-27 Severe Depression    Anxiety screenin/21/2022   PARISH 7   PARISH-7 Total Score 3       Multiple values from one day are sorted in reverse-chronological order     Interpretation of PARISH 7 Total Score   Score Severity:  0-4 No Anxiety   5-9 Mild Anxiety  10-14 Moderate Anxiety  15-21 Severe Anxiety    Medical Records/Labs/Diagnostic Tests Reviewed:  [place] PDMP records - no prescribed controlled medications found in the last 2 years      Impression:  1.  Major depressive disorder, recurrent, mild - worsening   2.  Intermittent explosive disorder - stable  3.  2nd trimester pregnancy (15 or 16 weeks with OLIVERIO 2023) - stable  4.  Long-term use of medications - Geodon  5.  History of bipolar mood disorder and post partum depression    Plan:  1.  Increase Geodon to 60 mg at bedtime for mood stabilization  -Metabolic monitoring: A1c ordered, lipid profile ordered at last appointment, still pending  2.  She would like to avoid SSRI/SNRI medication during pregnancy given history of poor response  3.  Continue Neurontin 300 mg twice daily for mood stabilization and RLS  4.  She was on a combination of Geodon and Neurontin during her last pregnancy and feels comfortable with this combination during this pregnancy as well.  5.  Continue weekly individual psychotherapy with Kristen Dyer at [facility]  6.  Provided supportive psychotherapy (> 16 minutes): Provided support in regards to ending of her relationship and struggles " during this pregnancy, encouraged her to focus on herself and this pregnancy at this time, encouraged to ask for help when needed etc.    Return to clinic in 4 weeks or sooner if symptoms worsen    The proposed treatment plan was discussed with the patient who was provided the opportunity to ask questions and make suggestions regarding alternative treatment. Patient verbalized understanding and expressed agreement with the plan.     Socorro Nance M.D.  12/21/22    This note was created using voice recognition software (Dragon). The accuracy of the dictation is limited by the abilities of the software. I have reviewed the note prior to signing, however some errors in grammar and context are still possible. If you have any questions related to this note please do not hesitate to contact our office.

## 2022-12-27 ENCOUNTER — ROUTINE PRENATAL (OUTPATIENT)
Dept: OBGYN | Facility: CLINIC | Age: 37
End: 2022-12-27
Payer: MEDICARE

## 2022-12-27 VITALS — DIASTOLIC BLOOD PRESSURE: 72 MMHG | SYSTOLIC BLOOD PRESSURE: 130 MMHG | BODY MASS INDEX: 21.61 KG/M2 | WEIGHT: 122 LBS

## 2022-12-27 DIAGNOSIS — O09.522 MULTIGRAVIDA OF ADVANCED MATERNAL AGE IN SECOND TRIMESTER: ICD-10-CM

## 2022-12-27 DIAGNOSIS — Z98.891 PREVIOUS CESAREAN SECTION: ICD-10-CM

## 2022-12-27 DIAGNOSIS — O09.92 PREGNANCY, SUPERVISION, HIGH-RISK, SECOND TRIMESTER: ICD-10-CM

## 2022-12-27 PROBLEM — Z34.92 SECOND TRIMESTER PREGNANCY: Status: RESOLVED | Noted: 2022-12-21 | Resolved: 2022-12-27

## 2022-12-27 PROCEDURE — 0502F SUBSEQUENT PRENATAL CARE: CPT | Performed by: OBSTETRICS & GYNECOLOGY

## 2022-12-27 ASSESSMENT — FIBROSIS 4 INDEX: FIB4 SCORE: 0.61

## 2023-01-04 ENCOUNTER — TELEPHONE (OUTPATIENT)
Dept: OBGYN | Facility: CLINIC | Age: 38
End: 2023-01-04
Payer: MEDICARE

## 2023-01-04 NOTE — TELEPHONE ENCOUNTER
Phone call with pt to discuss upcoming OB visit and labs.Pt is frustrated as the [facility] is unable to get her in any sooner than appt 1/19? She understands that we have called them and she is on a wait-list. She has not had NIPT testing drawn as the lab is only able to do it on mon,tues, wed before 10am. Pt understands that she should definitely try to get this done ASAP to help reduce her anxiety around this pregnancy. Support is given and pt will return tc prn.tmm

## 2023-01-06 ENCOUNTER — HOSPITAL ENCOUNTER (OUTPATIENT)
Dept: LAB | Facility: MEDICAL CENTER | Age: 38
End: 2023-01-06
Attending: OBSTETRICS & GYNECOLOGY
Payer: MEDICARE

## 2023-01-06 PROCEDURE — 36415 COLL VENOUS BLD VENIPUNCTURE: CPT

## 2023-01-06 PROCEDURE — 99001 SPECIMEN HANDLING PT-LAB: CPT

## 2023-01-12 ENCOUNTER — TELEPHONE (OUTPATIENT)
Dept: OBGYN | Facility: CLINIC | Age: 38
End: 2023-01-12
Payer: MEDICARE

## 2023-01-12 NOTE — TELEPHONE ENCOUNTER
01/12/23  1:30 PM  Patient calling to find out results of NIPT. After looking informed patient results are still not in and we will call once results are back.

## 2023-01-19 DIAGNOSIS — F33.0 MDD (MAJOR DEPRESSIVE DISORDER), RECURRENT EPISODE, MILD (HCC): ICD-10-CM

## 2023-01-19 DIAGNOSIS — F63.81 INTERMITTENT EXPLOSIVE DISORDER: ICD-10-CM

## 2023-01-19 RX ORDER — ZIPRASIDONE HYDROCHLORIDE 60 MG/1
60 CAPSULE ORAL
Qty: 30 CAPSULE | Refills: 0 | Status: SHIPPED | OUTPATIENT
Start: 2023-01-19 | End: 2023-01-23

## 2023-01-19 NOTE — TELEPHONE ENCOUNTER
Received request via: Patient    Was the patient seen in the last year in this department? Yes    Does the patient have an active prescription (recently filled or refills available) for medication(s) requested? No    Does the patient have retirement Plus and need 100 day supply (blood pressure, diabetes and cholesterol meds only)? Medication is not for cholesterol, blood pressure or diabetes and Patient does not have SCP

## 2023-01-23 ENCOUNTER — TELEMEDICINE (OUTPATIENT)
Dept: BEHAVIORAL HEALTH | Facility: CLINIC | Age: 38
End: 2023-01-23
Payer: MEDICARE

## 2023-01-23 DIAGNOSIS — F63.81 INTERMITTENT EXPLOSIVE DISORDER: ICD-10-CM

## 2023-01-23 DIAGNOSIS — Z34.92 SECOND TRIMESTER PREGNANCY: ICD-10-CM

## 2023-01-23 DIAGNOSIS — F33.0 MDD (MAJOR DEPRESSIVE DISORDER), RECURRENT EPISODE, MILD (HCC): ICD-10-CM

## 2023-01-23 PROCEDURE — 99214 OFFICE O/P EST MOD 30 MIN: CPT | Mod: 95 | Performed by: PSYCHIATRY & NEUROLOGY

## 2023-01-23 PROCEDURE — 90833 PSYTX W PT W E/M 30 MIN: CPT | Mod: 95 | Performed by: PSYCHIATRY & NEUROLOGY

## 2023-01-23 RX ORDER — ZIPRASIDONE HYDROCHLORIDE 80 MG/1
80 CAPSULE ORAL
Qty: 30 CAPSULE | Refills: 1 | Status: SHIPPED | OUTPATIENT
Start: 2023-01-23 | End: 2023-03-10 | Stop reason: SDUPTHER

## 2023-01-23 NOTE — PROGRESS NOTES
PSYCHIATRY VIRTUAL VISIT FOLLOW-UP NOTE    Chief Complaint   Patient presents with    Follow-Up     depression     This evaluation was conducted via [employer] using secure and encrypted videoconferencing technology.   The patient was in their home in the [facility].    The patient's identity was confirmed and verbal consent was obtained for this virtual visit.    History Of Present Illness:  Kristen Luna is a 37 y.o. female with major depressive disorder, intermittent explosive disorder, Sandra-Danlos Syndrome type III, gastroparesis, hypothyroidism comes in today for follow up, was last seen 4 weeks ago.  She is feeling the same in regards to her depression and has not noticed any improvements with the higher dose of Geodon.  She reports good compliance with medications.  She has been taking Neurontin only at bedtime as it was causing significant daytime fatigue.  She had a court date and got the primary custody of her daughter and has another court date in May.  She has been talking to her ex fiancé and is struggling to make a decision if she wants to work on the relationship or not.  She is trying her best to take care of herself but there are some struggles.  She has been having a difficult time getting a good night sleep.  She continues to have support from her mother.  She denies having thoughts of wanting to hurt herself.    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    Substance Use:  Alcohol - Denies   Nicotine - Denies  Cannabis - Denies   Illicit drugs - Denies     Past Medication Trials:  Zoloft (drug allergy), Depakote (ineffective, s/e - anxiety), Lamictal, Zyprexa (effective, s/e - weight gain), Seroquel  "(ineffective), Trazodone 100 mg (effective, discontinued during pregnancy).  She has tried several other medications but is unable to recall them.    Medications:  Current Outpatient Medications   Medication Sig Dispense Refill    ziprasidone (GEODON) 60 MG Cap Take 1 Capsule by mouth at bedtime. 30 Capsule 0    Prenatal Multivit-Min-Fe-FA (PRE-ANKUR FORMULA PO) Take  by mouth.      levothyroxine (SYNTHROID) 100 MCG Tab       gabapentin (NEURONTIN) 300 MG Cap Takes 300 mg in the morning and 600 mg at bedtime  Indications: Restless Leg Syndrome       No current facility-administered medications for this visit.     Review Of Systems:    Constitutional - Positive for fatigue  Psychiatric - Positive for depression, sleep problems     Physical Examination:  Vital signs: LMP 2022 (Exact Date)     Musculoskeletal: No abnormal movements.     Mental Status Evaluation:   General: Young female, tearful, dressed in casual attire, good grooming and hygiene, in no apparent distress, calm and cooperative, good eye contact, no psychomotor agitation or retardation  Orientation: Alert and oriented to person, place and time  Recent and remote memory: Grossly intact  Attention span and concentration: Grossly intact  Speech: Spontaneous, normal rate, rhythm and tone  Thought Process: Linear, logical and goal directed  Thought Content: Denies suicidal or homicidal ideations, intent or plan  Perception: No delusions noted  Associations: Intact  Language: Appropriate  Fund of knowledge and vocabulary: Grossly adequate  Mood: \"alright\"  Affect: Dysphoric, mood congruent  Insight: Good  Judgment: Good    Depression screening:      10/19/2020 2020 2022   Depression Screen (PHQ-2/PHQ-9)   PHQ-2 Total Score 0 0    0    PHQ-2 Total Score   2   PHQ-9 Total Score   13       Multiple values from one day are sorted in reverse-chronological order     Interpretation of PHQ-9 Total Score   Score Severity   1-4 No Depression   5-9 Mild " Depression   10-14 Moderate Depression   15-19 Moderately Severe Depression   20-27 Severe Depression    Anxiety screenin/21/2022   PARISH 7   PARISH-7 Total Score 3       Multiple values from one day are sorted in reverse-chronological order     Interpretation of PARISH 7 Total Score   Score Severity:  0-4 No Anxiety   5-9 Mild Anxiety  10-14 Moderate Anxiety  15-21 Severe Anxiety    Medical Records/Labs/Diagnostic Tests Reviewed:  [place] PDMP records - no prescribed controlled medications found in the last 2 years      Impression:  1.  Major depressive disorder, recurrent, mild - stable   2.  Intermittent explosive disorder - stable  3.  2nd trimester pregnancy (OLIVERIO 2023) - stable  4.  History of bipolar mood disorder and post partum depression    Plan:  1.  Increase Geodon to 80 mg at bedtime for mood stabilization  -Metabolic monitoring: A1c, lipid profile ordered previously, still pending  2.  Discussed retrial of SSRI/SNRI medication to target depression during pregnancy but she is not interested  3.  Continue Neurontin 300 mg at bedtime for mood stabilization and RLS  4.  Continue weekly individual psychotherapy with Kristen Dyer at [facility]  5.  Provided supportive psychotherapy (> 16 minutes): Emotional validation in regards to depression during pregnancy, discussed how relationship with ex fiancé is adding to stress and depression, advised her to talk to her about couples counseling and get it started as soon as possible so that she can make a decision regarding her and her kids future.    Return to clinic in 4 to 6 weeks or sooner if symptoms worsen    The proposed treatment plan was discussed with the patient who was provided the opportunity to ask questions and make suggestions regarding alternative treatment. Patient verbalized understanding and expressed agreement with the plan.     Socorro Nance M.D.  23    This note was created using voice recognition software (Dragon). The accuracy of  the dictation is limited by the abilities of the software. I have reviewed the note prior to signing, however some errors in grammar and context are still possible. If you have any questions related to this note please do not hesitate to contact our office.

## 2023-01-25 ENCOUNTER — TELEPHONE (OUTPATIENT)
Dept: OBGYN | Facility: CLINIC | Age: 38
End: 2023-01-25
Payer: MEDICARE

## 2023-01-26 ENCOUNTER — ROUTINE PRENATAL (OUTPATIENT)
Dept: OBGYN | Facility: CLINIC | Age: 38
End: 2023-01-26
Payer: MEDICARE

## 2023-01-26 VITALS — DIASTOLIC BLOOD PRESSURE: 70 MMHG | BODY MASS INDEX: 22.5 KG/M2 | WEIGHT: 127 LBS | SYSTOLIC BLOOD PRESSURE: 115 MMHG

## 2023-01-26 DIAGNOSIS — O44.02 COMPLETE PLACENTA PREVIA NOS OR WITHOUT HEMORRHAGE, SECOND TRIMESTER: ICD-10-CM

## 2023-01-26 PROCEDURE — 0502F SUBSEQUENT PRENATAL CARE: CPT | Performed by: OBSTETRICS & GYNECOLOGY

## 2023-01-26 ASSESSMENT — FIBROSIS 4 INDEX: FIB4 SCORE: 0.61

## 2023-01-26 NOTE — TELEPHONE ENCOUNTER
L/M for pt to further discus NIPT testing. Pt was to be contacted by [facility] for instructions and I wanted to make sure she knows that testing was not preformed due to  tubes? I appologized and advised we can further discuss at visit tomorrow and also hopefully redraw.diana    2023 Long discussion with pt to discuss NIPT testing issue. Pt is obviously upset that tubes in kit were . I appologized to her and she understands the issue. She has many concerns about HRPC, Placenta previa and care for duration of pregnancy.She has appt today and we will look forward to seeing her and answering all questions.She agrees to be re-drawn today.Support given.diana

## 2023-02-09 ENCOUNTER — TELEPHONE (OUTPATIENT)
Dept: OBGYN | Facility: CLINIC | Age: 38
End: 2023-02-09
Payer: MEDICARE

## 2023-02-09 NOTE — TELEPHONE ENCOUNTER
Pt advised of Normal NIPT Test results (boy) and she is very relieved. Support given and pt will be seen at her next visit or sooner prn.diana

## 2023-03-02 ENCOUNTER — ROUTINE PRENATAL (OUTPATIENT)
Dept: OBGYN | Facility: CLINIC | Age: 38
End: 2023-03-02
Payer: MEDICARE

## 2023-03-02 VITALS — BODY MASS INDEX: 23.91 KG/M2 | SYSTOLIC BLOOD PRESSURE: 120 MMHG | WEIGHT: 135 LBS | DIASTOLIC BLOOD PRESSURE: 62 MMHG

## 2023-03-02 DIAGNOSIS — O09.522 MULTIGRAVIDA OF ADVANCED MATERNAL AGE IN SECOND TRIMESTER: ICD-10-CM

## 2023-03-02 DIAGNOSIS — O44.02 COMPLETE PLACENTA PREVIA NOS OR WITHOUT HEMORRHAGE, SECOND TRIMESTER: ICD-10-CM

## 2023-03-02 DIAGNOSIS — O09.92 PREGNANCY, SUPERVISION, HIGH-RISK, SECOND TRIMESTER: ICD-10-CM

## 2023-03-02 DIAGNOSIS — Z98.891 PREVIOUS CESAREAN SECTION: ICD-10-CM

## 2023-03-02 PROCEDURE — 0502F SUBSEQUENT PRENATAL CARE: CPT | Performed by: OBSTETRICS & GYNECOLOGY

## 2023-03-02 ASSESSMENT — FIBROSIS 4 INDEX: FIB4 SCORE: 0.62

## 2023-03-10 ENCOUNTER — TELEMEDICINE (OUTPATIENT)
Dept: BEHAVIORAL HEALTH | Facility: CLINIC | Age: 38
End: 2023-03-10
Payer: MEDICARE

## 2023-03-10 DIAGNOSIS — F33.0 MDD (MAJOR DEPRESSIVE DISORDER), RECURRENT EPISODE, MILD (HCC): ICD-10-CM

## 2023-03-10 DIAGNOSIS — Z3A.27 27 WEEKS GESTATION OF PREGNANCY: ICD-10-CM

## 2023-03-10 DIAGNOSIS — F63.81 INTERMITTENT EXPLOSIVE DISORDER: ICD-10-CM

## 2023-03-10 PROBLEM — Z34.92 SECOND TRIMESTER PREGNANCY: Status: RESOLVED | Noted: 2022-12-21 | Resolved: 2023-03-10

## 2023-03-10 PROCEDURE — 99214 OFFICE O/P EST MOD 30 MIN: CPT | Mod: 95 | Performed by: PSYCHIATRY & NEUROLOGY

## 2023-03-10 RX ORDER — ZIPRASIDONE HYDROCHLORIDE 80 MG/1
80 CAPSULE ORAL
Qty: 30 CAPSULE | Refills: 1 | Status: SHIPPED | OUTPATIENT
Start: 2023-03-10 | End: 2023-05-12

## 2023-03-10 RX ORDER — LEVOTHYROXINE SODIUM 125 UG/1
125 TABLET ORAL DAILY
Status: ON HOLD | COMMUNITY
Start: 2023-02-24 | End: 2023-05-11 | Stop reason: SDUPTHER

## 2023-03-10 NOTE — PROGRESS NOTES
PSYCHIATRY VIRTUAL VISIT FOLLOW-UP NOTE    Chief Complaint   Patient presents with    Follow-Up     depression     This evaluation was conducted via Zoom using secure and encrypted videoconferencing technology.   The patient was in a private location outside of their home in the [facility].    The patient's identity was confirmed and verbal consent was obtained for this virtual visit.    History Of Present Illness:  Kristen Luna is a 38 y.o. female with major depressive disorder, intermittent explosive disorder, Sandra-Danlos Syndrome type III, gastroparesis, hypothyroidism comes in today for follow up, was last seen 6 weeks ago.  She is doing better in regards to depression since her last appointment.  She had to move out of her mother and stepfather's place and is currently in a shelter with her daughter.  She and her ex-fiancé have been talking and their relationship is doing better.  She plans on moving back in with him in April/May and after that they plan on starting couples counseling.  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  She is doing alright in regards to her pregnancy so far.  She has been taking the higher dose of Geodon and has noticed benefit.  She denies any side effects from Geodon so far.  She denies having thoughts of wanting to hurt herself.    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    Substance Use:  Alcohol - Denies   Nicotine - Denies  Cannabis - Denies   Illicit drugs - Denies     Past Medication Trials:  Zoloft (drug allergy), Depakote (ineffective, s/e - anxiety), Lamictal, Zyprexa (effective, s/e -  "weight gain), Seroquel (ineffective), Trazodone 100 mg (effective, discontinued during pregnancy).  She has tried several other medications but is unable to recall them.    Medications:  Current Outpatient Medications   Medication Sig Dispense Refill    ziprasidone (GEODON) 80 MG Cap Take 1 Capsule by mouth at bedtime. 30 Capsule 1    Prenatal Multivit-Min-Fe-FA (PRE- FORMULA PO) Take  by mouth.      levothyroxine (SYNTHROID) 100 MCG Tab       gabapentin (NEURONTIN) 300 MG Cap Take 300 mg by mouth at bedtime. Takes 300 mg in the morning and 600 mg at bedtime  Indications: Restless Leg Syndrome       No current facility-administered medications for this visit.     Review Of Systems:    Constitutional - Positive for fatigue  Psychiatric - Positive for depression, sleep problems     Physical Examination:  Vital signs: LMP 2022 (Exact Date)     Musculoskeletal: No abnormal movements.     Mental Status Evaluation:   General: Young female, dressed in casual attire, good grooming and hygiene, in no apparent distress, calm and cooperative, good eye contact, no psychomotor agitation or retardation  Orientation: Alert and oriented to person, place and time  Recent and remote memory: Grossly intact  Attention span and concentration: Grossly intact  Speech: Spontaneous, normal rate, rhythm and tone  Thought Process: Linear, logical and goal directed  Thought Content: Denies suicidal or homicidal ideations, intent or plan  Perception: No delusions noted  Associations: Intact  Language: Appropriate  Fund of knowledge and vocabulary: Grossly adequate  Mood: \"good and bad days\"  Affect: Dysphoric at times, mood congruent  Insight: Good  Judgment: Good    Depression screenin/9/2020     4:00 AM 2020     7:00 AM 2022     3:00 PM   Depression Screen (PHQ-2/PHQ-9)   PHQ-2 Total Score 0 0    PHQ-2 Total Score   2   PHQ-9 Total Score   13     Interpretation of PHQ-9 Total Score   Score Severity   1-4 No " Depression   5-9 Mild Depression   10-14 Moderate Depression   15-19 Moderately Severe Depression   20-27 Severe Depression    Anxiety screenin/21/2022     3:05 PM   PARISH 7   PARISH-7 Total Score 3     Interpretation of PARISH 7 Total Score   Score Severity:  0-4 No Anxiety   5-9 Mild Anxiety  10-14 Moderate Anxiety  15-21 Severe Anxiety    Medical Records/Labs/Diagnostic Tests Reviewed:  [place] PDMP records - no prescribed controlled medications found in the last 2 years      Impression:  1.  Major depressive disorder, recurrent, mild - improving    2.  Intermittent explosive disorder - stable  3.  2nd trimester pregnancy, 27 weeks (OLIVERIO 2023) - stable  4.  History of bipolar mood disorder and post partum depression    Plan:  1.  Continue Geodon 80 mg at bedtime for mood stabilization  -Metabolic monitoring: A1c, lipid profile ordered previously, still pending  2.  She is not interested in retrial of SSRI/SNRI medication  3.  Continue Neurontin 300 mg at bedtime for mood stabilization and RLS  4.  Continue weekly individual psychotherapy with Kristen Dyer at [facility]    Return to clinic in 6 weeks or sooner if symptoms worsen    The proposed treatment plan was discussed with the patient who was provided the opportunity to ask questions and make suggestions regarding alternative treatment. Patient verbalized understanding and expressed agreement with the plan.     Socorro Nance M.D.  03/10/23    This note was created using voice recognition software (Dragon). The accuracy of the dictation is limited by the abilities of the software. I have reviewed the note prior to signing, however some errors in grammar and context are still possible. If you have any questions related to this note please do not hesitate to contact our office.

## 2023-03-12 ENCOUNTER — HOSPITAL ENCOUNTER (EMERGENCY)
Facility: MEDICAL CENTER | Age: 38
End: 2023-03-12
Attending: OBSTETRICS & GYNECOLOGY | Admitting: OBSTETRICS & GYNECOLOGY
Payer: MEDICARE

## 2023-03-12 ENCOUNTER — APPOINTMENT (OUTPATIENT)
Dept: RADIOLOGY | Facility: MEDICAL CENTER | Age: 38
End: 2023-03-12
Attending: OBSTETRICS & GYNECOLOGY
Payer: MEDICARE

## 2023-03-12 VITALS
SYSTOLIC BLOOD PRESSURE: 118 MMHG | TEMPERATURE: 98.4 F | DIASTOLIC BLOOD PRESSURE: 70 MMHG | HEIGHT: 63 IN | RESPIRATION RATE: 16 BRPM | OXYGEN SATURATION: 96 % | BODY MASS INDEX: 23.92 KG/M2 | HEART RATE: 103 BPM | WEIGHT: 135 LBS

## 2023-03-12 LAB
EKG IMPRESSION: NORMAL
FLUAV RNA SPEC QL NAA+PROBE: NEGATIVE
FLUBV RNA SPEC QL NAA+PROBE: NEGATIVE
RSV RNA SPEC QL NAA+PROBE: NEGATIVE
SARS-COV-2 RNA RESP QL NAA+PROBE: DETECTED
SPECIMEN SOURCE: ABNORMAL

## 2023-03-12 PROCEDURE — 93005 ELECTROCARDIOGRAM TRACING: CPT

## 2023-03-12 PROCEDURE — 99284 EMERGENCY DEPT VISIT MOD MDM: CPT

## 2023-03-12 PROCEDURE — 81002 URINALYSIS NONAUTO W/O SCOPE: CPT

## 2023-03-12 PROCEDURE — 59025 FETAL NON-STRESS TEST: CPT | Mod: 26 | Performed by: OBSTETRICS & GYNECOLOGY

## 2023-03-12 PROCEDURE — 0241U HCHG SARS-COV-2 COVID-19 NFCT DS RESP RNA 4 TRGT MIC: CPT

## 2023-03-12 PROCEDURE — 302449 STATCHG TRIAGE ONLY (STATISTIC)

## 2023-03-12 PROCEDURE — 99284 EMERGENCY DEPT VISIT MOD MDM: CPT | Mod: 25,GC | Performed by: OBSTETRICS & GYNECOLOGY

## 2023-03-12 PROCEDURE — 71045 X-RAY EXAM CHEST 1 VIEW: CPT

## 2023-03-12 PROCEDURE — C9803 HOPD COVID-19 SPEC COLLECT: HCPCS

## 2023-03-12 ASSESSMENT — FIBROSIS 4 INDEX
FIB4 SCORE: 0.62
FIB4 SCORE: 0.62

## 2023-03-12 NOTE — ED TRIAGE NOTES
"Chief Complaint   Patient presents with    Shortness of Breath     Pt became congested with a cough yesterday morning.  Denies fever.    Pregnancy     27 weeks pregnant.  Currently diagnosed with placenta previa and is high-risk.  Denies any abnormal cramping or vaginal bleeding.       Pt reports to triage RN\"I have not felt baby move since last night. HE has been less active since last night.\"    This RN called L&D, who asked that pt be sent to their triage.  Pt wheeled in L&D in a .      "

## 2023-03-12 NOTE — DISCHARGE INSTRUCTIONS
Pre-term Labor (<37 weeks):  Call your physician or return to the hospital if:  You have painless regular contractions more than 4 in one hour.  Your water breaks (remember time and color).  You have menstrual-like cramps, a low dull backache or pressure in your pelvis or back.  Your baby does not move enough to complete the daily kick count (10 movements in 2 hours).  Your baby moves much less often than on the days before or you have not felt your baby move all day.  Please review the MEDICATION LIST section of your AFTER VISIT SUMMARY document.  Take your medication as prescribed  COVID-19  COVID-19 is a respiratory infection that is caused by a virus called severe acute respiratory syndrome coronavirus 2 (SARS-CoV-2). The disease is also known as coronavirus disease or novel coronavirus. In some people, the virus may not cause any symptoms. In others, it may cause a serious infection. The infection can get worse quickly and can lead to complications, such as:  Pneumonia, or infection of the lungs.  Acute respiratory distress syndrome or ARDS. This is fluid build-up in the lungs.  Acute respiratory failure. This is a condition in which there is not enough oxygen passing from the lungs to the body.  Sepsis or septic shock. This is a serious bodily reaction to an infection.  Blood clotting problems.  Secondary infections due to bacteria or fungus.  The virus that causes COVID-19 is contagious. This means that it can spread from person to person through droplets from coughs and sneezes (respiratory secretions).  What are the causes?  This illness is caused by a virus. You may catch the virus by:  Breathing in droplets from an infected person's cough or sneeze.  Touching something, like a table or a doorknob, that was exposed to the virus (contaminated) and then touching your mouth, nose, or eyes.  What increases the risk?  Risk for infection  You are more likely to be infected with this virus if you:  Live in or  travel to an area with a COVID-19 outbreak.  Come in contact with a sick person who recently traveled to an area with a COVID-19 outbreak.  Provide care for or live with a person who is infected with COVID-19.  Risk for serious illness  You are more likely to become seriously ill from the virus if you:  Are 65 years of age or older.  Have a long-term disease that lowers your body's ability to fight infection (immunocompromised).  Live in a nursing home or long-term care facility.  Have a long-term (chronic) disease such as:  Chronic lung disease, including chronic obstructive pulmonary disease or asthma  Heart disease.  Diabetes.  Chronic kidney disease.  Liver disease.  Are obese.  What are the signs or symptoms?  Symptoms of this condition can range from mild to severe. Symptoms may appear any time from 2 to 14 days after being exposed to the virus. They include:  A fever.  A cough.  Difficulty breathing.  Chills.  Muscle pains.  A sore throat.  Loss of taste or smell.  Some people may also have stomach problems, such as nausea, vomiting, or diarrhea.  Other people may not have any symptoms of COVID-19.  How is this diagnosed?  This condition may be diagnosed based on:  Your signs and symptoms, especially if:  You live in an area with a COVID-19 outbreak.  You recently traveled to or from an area where the virus is common.  You provide care for or live with a person who was diagnosed with COVID-19.  A physical exam.  Lab tests, which may include:  A nasal swab to take a sample of fluid from your nose.  A throat swab to take a sample of fluid from your throat.  A sample of mucus from your lungs (sputum).  Blood tests.  Imaging tests, which may include, X-rays, CT scan, or ultrasound.  How is this treated?  At present, there is no medicine to treat COVID-19. Medicines that treat other diseases are being used on a trial basis to see if they are effective against COVID-19.  Your health care provider will talk with you  about ways to treat your symptoms. For most people, the infection is mild and can be managed at home with rest, fluids, and over-the-counter medicines.  Treatment for a serious infection usually takes places in a hospital intensive care unit (ICU). It may include one or more of the following treatments. These treatments are given until your symptoms improve.  Receiving fluids and medicines through an IV.  Supplemental oxygen. Extra oxygen is given through a tube in the nose, a face mask, or a delgado.  Positioning you to lie on your stomach (prone position). This makes it easier for oxygen to get into the lungs.  Continuous positive airway pressure (CPAP) or bi-level positive airway pressure (BPAP) machine. This treatment uses mild air pressure to keep the airways open. A tube that is connected to a motor delivers oxygen to the body.  Ventilator. This treatment moves air into and out of the lungs by using a tube that is placed in your windpipe.  Tracheostomy. This is a procedure to create a hole in the neck so that a breathing tube can be inserted.  Extracorporeal membrane oxygenation (ECMO). This procedure gives the lungs a chance to recover by taking over the functions of the heart and lungs. It supplies oxygen to the body and removes carbon dioxide.  Follow these instructions at home:  Lifestyle  If you are sick, stay home except to get medical care. Your health care provider will tell you how long to stay home. Call your health care provider before you go for medical care.  Rest at home as told by your health care provider.  Do not use any products that contain nicotine or tobacco, such as cigarettes, e-cigarettes, and chewing tobacco. If you need help quitting, ask your health care provider.  Return to your normal activities as told by your health care provider. Ask your health care provider what activities are safe for you.  General instructions  Take over-the-counter and prescription medicines only as told by  your health care provider.  Drink enough fluid to keep your urine pale yellow.  Keep all follow-up visits as told by your health care provider. This is important.  How is this prevented?    There is no vaccine to help prevent COVID-19 infection. However, there are steps you can take to protect yourself and others from this virus.  To protect yourself:   Do not travel to areas where COVID-19 is a risk. The areas where COVID-19 is reported change often. To identify high-risk areas and travel restrictions, check the CDC travel website: wwwnc.cdc.gov/travel/notices  If you live in, or must travel to, an area where COVID-19 is a risk, take precautions to avoid infection.  Stay away from people who are sick.  Wash your hands often with soap and water for 20 seconds. If soap and water are not available, use an alcohol-based hand .  Avoid touching your mouth, face, eyes, or nose.  Avoid going out in public, follow guidance from your state and local health authorities.  If you must go out in public, wear a cloth face covering or face mask.  Disinfect objects and surfaces that are frequently touched every day. This may include:  Counters and tables.  Doorknobs and light switches.  Sinks and faucets.  Electronics, such as phones, remote controls, keyboards, computers, and tablets.  To protect others:  If you have symptoms of COVID-19, take steps to prevent the virus from spreading to others.  If you think you have a COVID-19 infection, contact your health care provider right away. Tell your health care team that you think you may have a COVID-19 infection.  Stay home. Leave your house only to seek medical care. Do not use public transport.  Do not travel while you are sick.  Wash your hands often with soap and water for 20 seconds. If soap and water are not available, use alcohol-based hand .  Stay away from other members of your household. Let healthy household members care for children and pets, if  possible. If you have to care for children or pets, wash your hands often and wear a mask. If possible, stay in your own room, separate from others. Use a different bathroom.  Make sure that all people in your household wash their hands well and often.  Cough or sneeze into a tissue or your sleeve or elbow. Do not cough or sneeze into your hand or into the air.  Wear a cloth face covering or face mask.  Where to find more information  Centers for Disease Control and Prevention: www.cdc.gov/coronavirus/2019-ncov/index.html  World Health Organization: www.who.int/health-topics/coronavirus  Contact a health care provider if:  You live in or have traveled to an area where COVID-19 is a risk and you have symptoms of the infection.  You have had contact with someone who has COVID-19 and you have symptoms of the infection.  Get help right away if:  You have trouble breathing.  You have pain or pressure in your chest.  You have confusion.  You have bluish lips and fingernails.  You have difficulty waking from sleep.  You have symptoms that get worse.  These symptoms may represent a serious problem that is an emergency. Do not wait to see if the symptoms will go away. Get medical help right away. Call your local emergency services (911 in the [place] Do not drive yourself to the hospital. Let the emergency medical personnel know if you think you have COVID-19.  Summary  COVID-19 is a respiratory infection that is caused by a virus. It is also known as coronavirus disease or novel coronavirus. It can cause serious infections, such as pneumonia, acute respiratory distress syndrome, acute respiratory failure, or sepsis.  The virus that causes COVID-19 is contagious. This means that it can spread from person to person through droplets from coughs and sneezes.  You are more likely to develop a serious illness if you are 65 years of age or older, have a weak immunity, live in a nursing home, or have chronic disease.  There is no  medicine to treat COVID-19. Your health care provider will talk with you about ways to treat your symptoms.  Take steps to protect yourself and others from infection. Wash your hands often and disinfect objects and surfaces that are frequently touched every day. Stay away from people who are sick and wear a mask if you are sick.  This information is not intended to replace advice given to you by your health care provider. Make sure you discuss any questions you have with your health care provider.  Document Released: 01/23/2020 Document Revised: 05/14/2020 Document Reviewed: 01/23/2020  Elsevier Patient Education © 2020 [employer]

## 2023-03-12 NOTE — PROGRESS NOTES
, [facility] , [place] 27w3d. Pt presents to L&D with c/o no fetal movement since last night as well as SOB and sore throat since yesterday morning. Pt denies LOF, VB, or UCs. Pt escorted to triage room, oriented to room and unit, and external monitors applied. POC discussed with pt and encouraged to state needs or questions at any time.  Fetal movement heard through monitor by RN at bedside.    0850 Dr. Lopez at bedside, POC discussed with pt.    1030 Pt reports feeling normal fetal movement at this time.    1108 Dr. Lopez updated, provider will come to bedside.    1125 Dr. Lopez at bedside, POC discussed with pt. D/C order received.    1158 L&D and COVID/self-isolation D/C instructions reviewed with pt who states understanding. Pt d/c to self with family.

## 2023-03-12 NOTE — ED PROVIDER NOTES
LABOR AND DELIVERY TRIAGE NOTE    PATIENT ID:  NAME:  Kristen Luna  MRN:               [id number]  YOB: 1985     38 y.o. female  at 27w3d.    Subjective: Pt presents due to decreased fetal movement superimposed on respiratory symptoms that started yesterday, consisting of sore throat, shortness of breath and cough.  Patient denies any fever, highest measured temp was 99.1 per patient.  Patient was not feeling infant move yesterday to this morning but states she has felt baby move during this encounter.    After the COVID-positive result, patient was counseled that risks are not known  for Paxlovid in pregnancy but it is not is deemed unsafe in pregnancy at this time and can shorten symptoms of COVID.  I instructed her that it is her choice whether or not to take this medication as she will likely recover from COVID regardless.    Patient lives in a family shelter, this pregnancy is noted to be in adopt out.  She presents with her 2-year-old child and another older woman.    Pregnancy complicated by residence of family shelter, placenta previa and plan for adopt out.    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    PSHx:  Past Surgical History:   Procedure Laterality Date    HI  DELIVERY ONLY N/A 2020    Procedure:  SECTION, PRIMARY;  Surgeon: Malinda Murdock M.D.;  Location: LABOR AND DELIVERY;  Service: Labor and Delivery    OTHER  2018    laparoscopic cholecystectomy 2018    KNEE ARTHROSCOPY  2010    Performed by KRYSTIAN COSTA at [facility] ORS    SYNOVECTOMY  2010    Performed by KRYSTIAN COSTA at [facility]S    MENISCECTOMY, KNEE, MEDIAL  2010    Performed by KRYSTIAN COSTA at [facility] ORS    KNEE ARTHROSCOPY  10/2009    left    DENTAL EXTRACTION(S)      wisdom teeth       Social Hx:  Social History     Tobacco Use    Smoking status: Former     Packs/day: 0.50     Years: 1.50     Pack years: 0.75     Types: Cigarettes     Quit date: 2009     Years since quittin.5    Smokeless tobacco: Never   Vaping Use    Vaping Use: Never used   Substance Use Topics    Alcohol use: No    Drug use: Not Currently     Types: Marijuana, Inhaled         Medications:  No current facility-administered medications on file prior to encounter.     Current Outpatient Medications on File Prior to Encounter   Medication Sig Dispense Refill    EUTHYROX 125 MCG Tab Take 125 mcg by mouth every day.      ziprasidone (GEODON) 80 MG Cap Take 1 Capsule by mouth at bedtime. 30 Capsule 1    Prenatal Multivit-Min-Fe-FA (PRE- FORMULA PO) Take  by mouth.      gabapentin (NEURONTIN) 300 MG Cap Take 300 mg by mouth at bedtime. Takes 300 mg in the morning and 600 mg at bedtime  Indications: Restless Leg Syndrome         Allergies:  Allergies   Allergen Reactions    Naprosyn [Naproxen] Hives    Codeine Itching    Fentanyl Photosensitivity     Reports blurred vision      Hydrocodone Itching    Zoloft Swelling     Left hand swelling           Objective:    Vitals:    23 1000 23 1030 23 1100 23 1130   BP:       Pulse: 88 97 91 (!) 103   Resp:       Temp:       TempSrc:       SpO2: 95% 95% 96% 96%   Weight:       Height:         Temp (24hrs), Av.9 °C (98.4 °F), Min:36.8 °C (98.3 °F), Max:36.9 °C (98.4 °F)    General: No acute distress, resting comfortably in bed.  HEENT: normocephalic, nontraumatic, PERRLA, EOMI  Cardiovascular: Heart RRR with no murmurs, rubs or gallops. Distal Pulses 2+  Respiratory: symmetric chest expansion,  +mild rhonchi in RLL, clear otherwise.    Abdomen: gravid, nontender  Musculoskeletal: FOREMAN spontaneously  Neuro: non focal with no numbness, tingling or changes in sensation    Cervix deferred  [place]: No contractions seen  FHRM: Baseline 125, mod variability, + accels, no decels    Labs:   Results for orders placed or performed during the hospital encounter of 23   COV-2, FLU A/B, AND RSV BY PCR (2-4 HOURS CEPHEID): Collect NP swab in VTM    Specimen: Nasopharyngeal; Respirate   Result Value Ref Range    Influenza virus A RNA Negative Negative    Influenza virus B, PCR Negative Negative    RSV, PCR Negative Negative    SARS-CoV-2 by PCR DETECTED (AA)     SARS-CoV-2 Source NP Swab    EKG   Result Value Ref Range    Report       [facility] Emergency Dept.    Test Date:  2023  Pt Name:    ELIF CRONIN               Department: ER  MRN:        [id number]                      Room:  Gender:     Female                       Technician: [id number]  :        1985                   Requested By:ER TRIAGE PROTOCOL  Order #:    [id number]                    Reading MD:    Measurements  Intervals                                Axis  Rate:       97                           P:          17  NV:         151                          QRS:        -6  QRSD:       91                           T:          15  QT:          367  QTc:        466    Interpretive Statements  Sinus rhythm  Borderline low voltage, extremity leads  Compared to ECG 2022 20:28:26  Poor R-wave progression no longer present         DX-CHEST-PORTABLE (1 VIEW)   Final Result      No acute cardiac or pulmonary abnormalities are identified.          Assessment: 38 y.o. female  at 27w3d presents with respiratory symptoms and decreased fetal movement.  NST reactive. Negative CXR.  COVID-positive, negative for flu and RSV.  O2 sats 95-96%.    Plan:   -Paxlovid prescribed to patient, counseled on risk, allow patient to decide whether to initiate treatment or not  -Patient  notified shelter of positive Covid diagnosis, patient was encouraged to follow protocol and isolate from others.  - Patient is cleared to return home with family. Encouraged to see MD/DO for increased fever, worsening SOB making her unable to perform daily tasks, increased painful uterine contractions @ 3-5, vaginal bleeding, loss of fluid, or other serious symptoms.      Discussed case with Dr. Gregorio, [facility] Attending. Case was discussed and attending agreed with plan prior to discharge of patient.      Danna Lopez D.O.  PGY-1

## 2023-03-13 ENCOUNTER — TELEPHONE (OUTPATIENT)
Dept: OBGYN | Facility: CLINIC | Age: 38
End: 2023-03-13

## 2023-03-13 LAB
APPEARANCE UR: CLEAR
COLOR UR AUTO: YELLOW
GLUCOSE UR QL STRIP.AUTO: NEGATIVE MG/DL
KETONES UR QL STRIP.AUTO: NEGATIVE MG/DL
LEUKOCYTE ESTERASE UR QL STRIP.AUTO: NEGATIVE
NITRITE UR QL STRIP.AUTO: NEGATIVE
PH UR STRIP.AUTO: 7 [PH] (ref 5–8)
PROT UR QL STRIP: NEGATIVE MG/DL
RBC UR QL AUTO: ABNORMAL
SP GR UR STRIP.AUTO: <=1.005 (ref 1–1.03)

## 2023-03-13 NOTE — TELEPHONE ENCOUNTER
Pt called stating went to L&D yesterday pt is covid positive pt given Rx to treat covid. Pt tearful on phone also advised her may take tylenol and stay at home.     Pt understood and would like this message to be sent to Huy

## 2023-04-03 ENCOUNTER — APPOINTMENT (OUTPATIENT)
Dept: OBGYN | Facility: CLINIC | Age: 38
End: 2023-04-03
Payer: MEDICARE

## 2023-04-03 NOTE — DOCUMENTATION QUERY
[facility]                                                                       Query Response Note      PATIENT:               ELIF CRONIN  ACCT #:                  [id number]  MRN:                     [id number]  :                      1985  ADMIT DATE:       3/12/2023 8:27 AM  DISCH DATE:        3/12/2023 11:58 AM  RESPONDING  PROVIDER #:        [id number]           QUERY TEXT:    Your assistance is needed in determining the reason  for EKG that was ordered.  This service is non-covered by the diagnoses documented in the medical record.      Can you please provide an additional diagnosis that describes the sign or symptom that  (prompted/initiated) the EKG.    NOTE:  If an appropriate answer is not listed below, please respond with a new note.        The patient's Clinical Indicators include:  EKG  Options provided:   -- Other related diagnosis, Please specify diagnosis demonstrating medical necessity for lab/imaging/other test.)   -- Unable to determine      Query created by: Shikha Pastor on 3/27/2023 3:16 AM    RESPONSE TEXT:    Shortness of breath          Electronically signed by:  JOVI KAISER MD 4/3/2023 1:32 PM

## 2023-04-13 ENCOUNTER — HOSPITAL ENCOUNTER (OUTPATIENT)
Dept: LAB | Facility: MEDICAL CENTER | Age: 38
End: 2023-04-13
Attending: OBSTETRICS & GYNECOLOGY
Payer: MEDICARE

## 2023-04-13 DIAGNOSIS — O09.92 PREGNANCY, SUPERVISION, HIGH-RISK, SECOND TRIMESTER: ICD-10-CM

## 2023-04-13 PROCEDURE — 36415 COLL VENOUS BLD VENIPUNCTURE: CPT

## 2023-04-13 PROCEDURE — 82950 GLUCOSE TEST: CPT

## 2023-04-14 LAB — GLUCOSE 1H P 50 G GLC PO SERPL-MCNC: 133 MG/DL (ref 70–139)

## 2023-04-18 ENCOUNTER — ROUTINE PRENATAL (OUTPATIENT)
Dept: OBGYN | Facility: CLINIC | Age: 38
End: 2023-04-18
Payer: MEDICARE

## 2023-04-18 VITALS — SYSTOLIC BLOOD PRESSURE: 113 MMHG | DIASTOLIC BLOOD PRESSURE: 74 MMHG | BODY MASS INDEX: 25.33 KG/M2 | WEIGHT: 143 LBS

## 2023-04-18 DIAGNOSIS — O09.93 SUPERVISION OF HIGH RISK PREGNANCY IN THIRD TRIMESTER: ICD-10-CM

## 2023-04-18 DIAGNOSIS — O09.523 MULTIGRAVIDA OF ADVANCED MATERNAL AGE IN THIRD TRIMESTER: ICD-10-CM

## 2023-04-18 DIAGNOSIS — Z98.891 PREVIOUS CESAREAN SECTION: ICD-10-CM

## 2023-04-18 PROBLEM — O44.02 COMPLETE PLACENTA PREVIA NOS OR WITHOUT HEMORRHAGE, SECOND TRIMESTER: Status: RESOLVED | Noted: 2023-01-26 | Resolved: 2023-04-18

## 2023-04-18 PROCEDURE — 0502F SUBSEQUENT PRENATAL CARE: CPT | Performed by: OBSTETRICS & GYNECOLOGY

## 2023-04-18 ASSESSMENT — FIBROSIS 4 INDEX: FIB4 SCORE: 0.62

## 2023-05-01 ENCOUNTER — ROUTINE PRENATAL (OUTPATIENT)
Dept: OBGYN | Facility: CLINIC | Age: 38
End: 2023-05-01
Payer: MEDICARE

## 2023-05-01 VITALS — DIASTOLIC BLOOD PRESSURE: 76 MMHG | BODY MASS INDEX: 26.04 KG/M2 | SYSTOLIC BLOOD PRESSURE: 106 MMHG | WEIGHT: 147 LBS

## 2023-05-01 DIAGNOSIS — O09.93 SUPERVISION OF HIGH RISK PREGNANCY IN THIRD TRIMESTER: ICD-10-CM

## 2023-05-01 DIAGNOSIS — O09.523 MULTIGRAVIDA OF ADVANCED MATERNAL AGE IN THIRD TRIMESTER: ICD-10-CM

## 2023-05-01 DIAGNOSIS — Z98.891 PREVIOUS CESAREAN SECTION: ICD-10-CM

## 2023-05-01 PROCEDURE — 0502F SUBSEQUENT PRENATAL CARE: CPT | Performed by: OBSTETRICS & GYNECOLOGY

## 2023-05-01 ASSESSMENT — FIBROSIS 4 INDEX: FIB4 SCORE: 0.62

## 2023-05-08 ENCOUNTER — TELEPHONE (OUTPATIENT)
Dept: OBGYN | Facility: CLINIC | Age: 38
End: 2023-05-08
Payer: MEDICARE

## 2023-05-08 NOTE — TELEPHONE ENCOUNTER
"Patient called stating she had done a NST with [facility] 5/4/23 where they relayed fetal heart to be \"all over the place\". Patient was seen again 5/5/23 for a BPP, and was told baby is not practice breathing. Patient was seen again today 5/8/23 patient stated NST was ok but was told baby is still not practice breathing. Advised with Huy and patient was encouraged to follow strict kick counts, and patient was schedule to follow up with is 5/9/23 with Dr. Murdock. Contacted [facility] to obtain records prior to appointment.   "

## 2023-05-10 ENCOUNTER — HOSPITAL ENCOUNTER (OUTPATIENT)
Facility: MEDICAL CENTER | Age: 38
End: 2023-05-10
Attending: OBSTETRICS & GYNECOLOGY
Payer: MEDICARE

## 2023-05-10 ENCOUNTER — ROUTINE PRENATAL (OUTPATIENT)
Dept: OBGYN | Facility: CLINIC | Age: 38
End: 2023-05-10
Payer: MEDICARE

## 2023-05-10 VITALS — SYSTOLIC BLOOD PRESSURE: 131 MMHG | WEIGHT: 145.9 LBS | BODY MASS INDEX: 25.85 KG/M2 | DIASTOLIC BLOOD PRESSURE: 74 MMHG

## 2023-05-10 DIAGNOSIS — O36.8130 DECREASED FETAL MOVEMENTS IN THIRD TRIMESTER, SINGLE OR UNSPECIFIED FETUS: ICD-10-CM

## 2023-05-10 DIAGNOSIS — Z98.891 PREVIOUS CESAREAN SECTION: ICD-10-CM

## 2023-05-10 DIAGNOSIS — O09.523 MULTIGRAVIDA OF ADVANCED MATERNAL AGE IN THIRD TRIMESTER: ICD-10-CM

## 2023-05-10 LAB
APPEARANCE UR: NORMAL
BILIRUB UR STRIP-MCNC: NORMAL MG/DL
COLOR UR AUTO: NORMAL
GLUCOSE UR STRIP.AUTO-MCNC: NEGATIVE MG/DL
KETONES UR STRIP.AUTO-MCNC: NORMAL MG/DL
LEUKOCYTE ESTERASE UR QL STRIP.AUTO: NEGATIVE
NITRITE UR QL STRIP.AUTO: NEGATIVE
NST ACOUSTIC STIMULATION: NORMAL
NST ACTION NECESSARY: NORMAL
NST ASSESSMENT: NORMAL
NST BASELINE: NORMAL
NST INDICATIONS: NORMAL
NST OTHER DATA: NORMAL
NST READ BY: NORMAL
NST RETURN: NORMAL
NST UTERINE ACTIVITY: NORMAL
PH UR STRIP.AUTO: 5.5 [PH] (ref 5–8)
PROT UR QL STRIP: NEGATIVE MG/DL
RBC UR QL AUTO: NEGATIVE
SP GR UR STRIP.AUTO: 1.02
UROBILINOGEN UR STRIP-MCNC: NORMAL MG/DL

## 2023-05-10 PROCEDURE — 81002 URINALYSIS NONAUTO W/O SCOPE: CPT | Performed by: OBSTETRICS & GYNECOLOGY

## 2023-05-10 PROCEDURE — 0502F SUBSEQUENT PRENATAL CARE: CPT | Performed by: OBSTETRICS & GYNECOLOGY

## 2023-05-10 PROCEDURE — 87150 DNA/RNA AMPLIFIED PROBE: CPT

## 2023-05-10 PROCEDURE — 87081 CULTURE SCREEN ONLY: CPT

## 2023-05-10 PROCEDURE — 59025 FETAL NON-STRESS TEST: CPT | Performed by: OBSTETRICS & GYNECOLOGY

## 2023-05-10 ASSESSMENT — FIBROSIS 4 INDEX: FIB4 SCORE: 0.62

## 2023-05-10 NOTE — PROGRESS NOTES
MAI:  35w6d    Pt reports doing well.  Reports decreased fetal movement over the past week+. She has had 2 X weekly monitoring at [facility] with last being Monday 2 days ago. She states on BPP there was no breathing. She is scheduled there again tomorrow. But she remains worried about not able to get KCs. She denies CTXs, VB, or LOF. She denies S&S of PIH. She still desires TOLAC if spontaneously labors and otherwise will not have induction but repeat c/section if reaches 39 weeks.     R/B/A to TOLAC discussed in detail and TOLAC consent read, reviewed, and signed today. All ?S answered.      /74   Wt 145 lb 14.4 oz   LMP 2022 (Exact Date)   BMI 25.85 kg/m²   gen: AAO, NAD  FHTs: RNST  FH: 34    Kristen Luna   Gestational age: 35w6d     Indication: AMA, Covid in pregnancy, decreased fetal movement    NST Interpretation:  Baseline 120, Reactive, moderate variability, no decels       A/P: 38 y.o.  @ 35w6d   - Sandra Danlos Syndrome  - Covid in Pregnancy  - AMA  - Baby for adoption to her brother  - previous c/section- desires TOLAC if spontaneously labors. Otherwise agree to repeat c/s at term.   - decreased fetal movement  - has monitoring scheduled with [facility] tomorrow and will keep that visit and continue 2 X weekly monitoring  Reviewed labor precautions (to call or come to hospital for ctx q5min, VB, LOF, decreased FM)    RTC 1 wks

## 2023-05-11 ENCOUNTER — HOSPITAL ENCOUNTER (EMERGENCY)
Facility: MEDICAL CENTER | Age: 38
End: 2023-05-11
Attending: OBSTETRICS & GYNECOLOGY | Admitting: OBSTETRICS & GYNECOLOGY
Payer: MEDICARE

## 2023-05-11 VITALS
SYSTOLIC BLOOD PRESSURE: 123 MMHG | RESPIRATION RATE: 16 BRPM | BODY MASS INDEX: 25.69 KG/M2 | TEMPERATURE: 97.7 F | HEART RATE: 81 BPM | DIASTOLIC BLOOD PRESSURE: 73 MMHG | WEIGHT: 145 LBS

## 2023-05-11 DIAGNOSIS — E03.9 HYPOTHYROIDISM, UNSPECIFIED TYPE: ICD-10-CM

## 2023-05-11 LAB
T4 FREE SERPL-MCNC: 0.96 NG/DL (ref 0.93–1.7)
TSH SERPL DL<=0.005 MIU/L-ACNC: 7.21 UIU/ML (ref 0.38–5.33)

## 2023-05-11 PROCEDURE — 99283 EMERGENCY DEPT VISIT LOW MDM: CPT

## 2023-05-11 PROCEDURE — 84443 ASSAY THYROID STIM HORMONE: CPT

## 2023-05-11 PROCEDURE — 36415 COLL VENOUS BLD VENIPUNCTURE: CPT

## 2023-05-11 PROCEDURE — 99283 EMERGENCY DEPT VISIT LOW MDM: CPT | Mod: GC,25 | Performed by: OBSTETRICS & GYNECOLOGY

## 2023-05-11 PROCEDURE — 59025 FETAL NON-STRESS TEST: CPT

## 2023-05-11 PROCEDURE — 84439 ASSAY OF FREE THYROXINE: CPT

## 2023-05-11 PROCEDURE — 59025 FETAL NON-STRESS TEST: CPT | Mod: 26 | Performed by: OBSTETRICS & GYNECOLOGY

## 2023-05-11 RX ORDER — ACETAMINOPHEN 500 MG
1000 TABLET ORAL EVERY 4 HOURS PRN
Status: DISCONTINUED | OUTPATIENT
Start: 2023-05-11 | End: 2023-05-11 | Stop reason: HOSPADM

## 2023-05-11 RX ORDER — ACETAMINOPHEN 500 MG
500 TABLET ORAL EVERY 4 HOURS PRN
Status: DISCONTINUED | OUTPATIENT
Start: 2023-05-11 | End: 2023-05-11

## 2023-05-11 RX ORDER — LEVOTHYROXINE SODIUM 125 UG/1
125 TABLET ORAL DAILY
Qty: 30 TABLET | Refills: 1 | Status: ON HOLD | OUTPATIENT
Start: 2023-05-11 | End: 2023-06-06

## 2023-05-11 ASSESSMENT — FIBROSIS 4 INDEX: FIB4 SCORE: 0.62

## 2023-05-11 NOTE — ED PROVIDER NOTES
LABOR AND DELIVERY TRIAGE NOTE    PATIENT ID:  NAME:  Kristen Luna  MRN:               [id number]  YOB: 1985     38 y.o. female  at 36w0d.    Subjective: Pt here from clinic due to contractions occurring every 5 minutes at her outpatient appointment. Patient reports contractions started occurring at noon today. She states she continues to feel contractions every few minutes. She has been eating and drinking normally today. She has not been recently sexually active. Contractions stop after she changes position and gets up and walks around.     Patient also reports she has not been taking her levothyroxine 125 mcg for the past week as she has run out. Per chart review [facility] was following her TSH and increased her levothyroxine from 100 to 125 mcg in March. Her most recent documented TSH and T4 was 11 and 0.81 2023.     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 She is being followed by [facility]. Patient had previous C section, desires TOLAC. Baby planned for adoption to patient's brother.     positive for contractions  positive pain   negative for LOF  negative for vaginal bleeding  positive for fetal movement    PNL:  Blood Type O+, Rubella immune, HIV last tested  and was NR, TrepAb neg, HBsAg NR, GC/CT neg/neg  1 HR GTT: Normal   3 HR GTT: Not performed   GBS Has not yet been tested.     ROS: Patient denies any fever chills, nausea, vomiting, headache, chest pain, shortness of breath, or dysuria or unusual swelling of hands or feet.     PMHx:   Past Medical History:   Diagnosis Date    Arrhythmia     SVT no episodes since     Bipolar affective disorder (HCC)     Breath shortness     at noc [id number]; primary MD aware    Complete placenta previa nos or without hemorrhage, second trimester 2023    Complete previa on 20 week US with 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     Depression     Gastroparesis     Heart burn     Hypothyroid     Pain     Left knee 5/10    PTSD (post-traumatic stress disorder)         OB History    Para Term  AB Living   2 1 1     1   SAB IAB Ectopic Molar Multiple Live Births           0 1      # Outcome Date GA Lbr Ernesto/2nd Weight Sex Delivery Anes PTL Lv   2 Current            1 Term 20 40w1d  3.81 kg (8 lb 6.4 oz) F CS-LTranv Spinal N DAREK      Complications: Failure to Progress in First Stage     GYN: denies STIs, no cervical procedures    PSHx:  Past Surgical History:   Procedure Laterality Date    [place]  DELIVERY ONLY N/A 2020    Procedure:  SECTION, PRIMARY;  Surgeon: Malinda Murdock M.D.;  Location: LABOR AND DELIVERY;  Service: Labor and Delivery    OTHER      laparoscopic cholecystectomy 2018    KNEE ARTHROSCOPY  2010    Performed by KRYSTIAN COSTA at [facility] ORS    SYNOVECTOMY  2010    Performed by KRYSTIAN COSTA at [facility] ORS    MENISCECTOMY, KNEE, MEDIAL  2010    Performed by KRYSTIAN COSTA at [facility] ORS    KNEE ARTHROSCOPY  10/2009    left    DENTAL EXTRACTION(S)  2007    wisdom teeth       Social Hx:  Social History     Tobacco Use    Smoking status: Former     Packs/day: 0.50     Years: 1.50     Pack years: 0.75     Types: Cigarettes     Quit date: 2009     Years since quittin.7    Smokeless tobacco: Never   Vaping Use    Vaping Use: Never used   Substance Use Topics    Alcohol use: No    Drug use: Not Currently     Types: Marijuana, Inhaled       Medications:  No current facility-administered medications on file prior to encounter.     Current Outpatient Medications on File Prior to Encounter   Medication Sig Dispense Refill    EUTHYROX 125 MCG Tab Take 125 mcg by mouth every day.      ziprasidone (GEODON) 80 MG Cap Take 1 Capsule by mouth at bedtime. 30 Capsule 1    Prenatal Multivit-Min-Fe-FA (PRE-ANKUR FORMULA PO) Take  by  mouth.      gabapentin (NEURONTIN) 300 MG Cap Take 300 mg by mouth at bedtime. Takes 300 mg in the morning and 600 mg at bedtime  Indications: Restless Leg Syndrome       Allergies:  Patient reports allergy to Naproxen, Codeine, Fentanyl, hydrocodone, and zoloft     Objective:    Vitals:    23 1500   BP: 123/73   Pulse: 81   Resp: 16   Temp: 36.5 °C (97.7 °F)   TempSrc: Temporal   Weight: 65.8 kg (145 lb)     Temp (24hrs), Av.5 °C (97.7 °F), Min:36.5 °C (97.7 °F), Max:36.5 °C (97.7 °F)    General: No acute distress, resting comfortably in bed.  HEENT: normocephalic, nontraumatic, PERRLA, EOMI  Cardiovascular: Heart RRR with no murmurs, rubs or gallops. Distal Pulses 2+  Respiratory: symmetric chest expansion, lungs CTAB, with no wheezes, rales, rhonci  Abdomen: gravid, nontender  Musculoskeletal: FOREMAN spontaneously  Neuro: non focal with no numbness, tingling or changes in sensation    Cervix: Closed/50%/high   [place]: Uterine Contractions irregular,  Q5-20 minutes.   FHRM: Baseline 140s, + variability, + accels, no decels    Assessment: 38 y.o. female  at 36w0d presents with reported contractions occurring every 2-5 minutes. Patient also has not been complaint with home medication of levothyroxine.     #Matt Grewal contractions   - On initial cervix check patient was closed/50%/soft and high. [place] indicating uterine irritation and occasional contractions. Contractions resolve when patient is standing or walking around.   - On repeat cervix check several hours later patient did not make cervical changes   Plan:   - Discussed with patient she is not in active labor and likely had matt grewal contractions. Pt cleared to return home with family. Encouraged to see MD/DO for increased painful uterine contractions @ 3-5, vaginal bleeding, loss of fluid, or other serious symptoms.    #History of hypothyroid   - Patient has history of hypothyroid on levothyroxine 125 mcg. She has not been taking this  medication for the past week as she has run out.   - [facility] is following, per our documentation last TSH and T4 was check in 1/2023.   - Repeat TSH and T4 ordered. TSH of 7.2. Patient will follow up with [facility] and [facility] next week.   - Patient's thyroid medication was refilled.     Discussed case with Dr. Gregorio, [facility] Attending. Case was discussed and attending agreed with plan prior to discharge of patient.    Analisa Arias M.D. PGY-1

## 2023-05-11 NOTE — PROGRESS NOTES
38 y.o.  EDC 23 EGA 36.0 here to LDA2 sent over from [facility] for OB check. Pt was lotus q 2-5 minutes on the monitor.   [facility] [place] applied. VSS. Pt has hx of Previous C/s, desires TOLAC.   1430 SVE closed/50/-2. Order for TSH. MD Dr. Burnett at bedside.   1510 Repeat SVE no change from previous exam. Prescription sent to [facility] pharmacy. Discharge order received. Discharge teaching performed. 1720 Discharged to home, ambulatory.

## 2023-05-12 LAB — GP B STREP DNA SPEC QL NAA+PROBE: NEGATIVE

## 2023-05-15 ENCOUNTER — ROUTINE PRENATAL (OUTPATIENT)
Dept: OBGYN | Facility: CLINIC | Age: 38
End: 2023-05-15
Payer: MEDICARE

## 2023-05-15 VITALS — BODY MASS INDEX: 26.04 KG/M2 | DIASTOLIC BLOOD PRESSURE: 81 MMHG | WEIGHT: 147 LBS | SYSTOLIC BLOOD PRESSURE: 129 MMHG

## 2023-05-15 DIAGNOSIS — O09.523 MULTIGRAVIDA OF ADVANCED MATERNAL AGE IN THIRD TRIMESTER: ICD-10-CM

## 2023-05-15 DIAGNOSIS — O21.9 NAUSEA AND VOMITING IN PREGNANCY: ICD-10-CM

## 2023-05-15 DIAGNOSIS — Z98.891 PREVIOUS CESAREAN SECTION: ICD-10-CM

## 2023-05-15 DIAGNOSIS — E03.9 HYPOTHYROIDISM, UNSPECIFIED TYPE: ICD-10-CM

## 2023-05-15 PROCEDURE — 1125F AMNT PAIN NOTED PAIN PRSNT: CPT | Performed by: OBSTETRICS & GYNECOLOGY

## 2023-05-15 PROCEDURE — 3074F SYST BP LT 130 MM HG: CPT | Performed by: OBSTETRICS & GYNECOLOGY

## 2023-05-15 PROCEDURE — 0502F SUBSEQUENT PRENATAL CARE: CPT | Performed by: OBSTETRICS & GYNECOLOGY

## 2023-05-15 PROCEDURE — 3079F DIAST BP 80-89 MM HG: CPT | Performed by: OBSTETRICS & GYNECOLOGY

## 2023-05-15 RX ORDER — ONDANSETRON 4 MG/1
4 TABLET, ORALLY DISINTEGRATING ORAL EVERY 6 HOURS PRN
Qty: 12 TABLET | Refills: 0 | Status: ON HOLD | OUTPATIENT
Start: 2023-05-15 | End: 2023-06-06

## 2023-05-15 ASSESSMENT — FIBROSIS 4 INDEX: FIB4 SCORE: 0.62

## 2023-05-17 ENCOUNTER — HOSPITAL ENCOUNTER (EMERGENCY)
Facility: MEDICAL CENTER | Age: 38
End: 2023-05-17
Attending: OBSTETRICS & GYNECOLOGY | Admitting: OBSTETRICS & GYNECOLOGY
Payer: MEDICARE

## 2023-05-17 VITALS
SYSTOLIC BLOOD PRESSURE: 121 MMHG | WEIGHT: 147 LBS | HEIGHT: 62 IN | BODY MASS INDEX: 27.05 KG/M2 | OXYGEN SATURATION: 97 % | TEMPERATURE: 97.4 F | HEART RATE: 100 BPM | RESPIRATION RATE: 16 BRPM | DIASTOLIC BLOOD PRESSURE: 66 MMHG

## 2023-05-17 PROCEDURE — 59025 FETAL NON-STRESS TEST: CPT

## 2023-05-17 PROCEDURE — 99283 EMERGENCY DEPT VISIT LOW MDM: CPT

## 2023-05-17 PROCEDURE — 99282 EMERGENCY DEPT VISIT SF MDM: CPT | Performed by: ADVANCED PRACTICE MIDWIFE

## 2023-05-17 ASSESSMENT — FIBROSIS 4 INDEX: FIB4 SCORE: 0.62

## 2023-05-17 ASSESSMENT — ENCOUNTER SYMPTOMS
CONSTITUTIONAL NEGATIVE: 1
MUSCULOSKELETAL NEGATIVE: 1
GASTROINTESTINAL NEGATIVE: 1
RESPIRATORY NEGATIVE: 1

## 2023-05-17 NOTE — ED PROVIDER NOTES
"Emergency Obstetric Consultation     Date of Service  2023    Reason for Consultation  No chief complaint on file.      History of Presenting Illness  38 y.o. female who presented 2023 with Reason for consult: contractionsPatient reports she has been having contractions for past week. When she was at [facility] 6 days ago, she was noted to have \"contractions every 2-5 minutes on the machine.\" She reports not feeling those then. She was then seen two days ago and noted to be closed with her cervical exam. 1 day ago she noted additional cramping and at that time tightness around her belly button.   Today, the abdominal pain has continued but is localized to belly button and right side of abdomen. She denies recent sexual activity or vaginal bleeding.     She had previous  section for failed induction of labor. Desiring TOLAC with this pregnancy.   Contractions: Onset was 1-2 hours ago.    Fetal activity: Perceived fetal activity is normal.    Last perceived fetal movement was within the past hour.    Prenatal complications: AMA, MDD, hypothyroidism, EDS    .    Review of Systems  Review of Systems   Constitutional: Negative.    HENT: Negative.     Respiratory: Negative.     Gastrointestinal: Negative.    Musculoskeletal: Negative.        Obstetric History    OB History    Para Term  AB Living   2 1 1     1   SAB IAB Ectopic Molar Multiple Live Births           0 1      # Outcome Date GA Lbr Ernesto/2nd Weight Sex Delivery Anes PTL Lv   2 Current            1 Term 20 40w1d  3.81 kg (8 lb 6.4 oz) F CS-LTranv Spinal N DAREK      Complications: Failure to Progress in First Stage       Gynecologic History  Patient's last menstrual period was 2022 (exact date).    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    Family History  family history includes Bipolar disorder in her maternal aunt; Depression in her mother; Diabetes in an other family member; Heart Disease in her mother.    Social History   reports that she quit smoking about 13 years ago. Her smoking use included cigarettes. She has a 0.75 pack-year smoking history. She has never used smokeless tobacco. She reports that she does not currently use drugs after having used the following drugs: Marijuana and Inhaled. She reports that she does not drink alcohol.    Medications  Medications Prior to Admission   Medication Sig Dispense Refill Last Dose    ondansetron (ZOFRAN ODT) 4 MG TABLET DISPERSIBLE Take 1 Tablet by mouth every 6 hours as needed for Nausea/Vomiting. 12 Tablet 0     ziprasidone (GEODON) 80 MG Cap Take 1 Capsule by mouth at bedtime. 30 Capsule 0     EUTHYROX 125 MCG Tab Take 1 Tablet by mouth every day. 30 Tablet 1     diphenhydrAMINE HCl, Sleep, (UNISOM SLEEPGELS PO) Take  by mouth as needed.       Prenatal Multivit-Min-Fe-FA (PRE- FORMULA PO) Take  by mouth.       gabapentin (NEURONTIN) 300 MG Cap Take 300 mg by mouth at bedtime. Takes 300 mg in the morning and 600 mg at bedtime  Indications: Restless Leg Syndrome          Allergies  Allergies   Allergen Reactions    Naprosyn [Naproxen] Hives    Codeine Itching    Fentanyl Photosensitivity     Reports blurred  vision     Hydrocodone Itching    Zoloft Swelling     Left hand swelling       Physical Exam  Maternal Exam:  Uterine Assessment: Contraction strength is mild.  Contraction frequency is irregular.   Abdomen: Patient reports no abdominal tenderness. Surgical scars: low transverse.    Fetal presentation: vertex  Introitus: Normal vulva. Normal vagina.  Cervix: Cervix evaluated by digital exam.    Per RN; closed and highBaseline rate: 150.   Variability: moderate (6-25 bpm).    Fetal State Assessment: Category I - tracings are normal.  Skin:     General: Skin is warm and dry.   Musculoskeletal:         General: Normal range of motion.      Cervical back: Normal range of motion.   Abdominal:      General: Bowel sounds are normal.   Constitutional:       Appearance: Normal appearance.   Pulmonary:      Effort: Pulmonary effort is normal.   Neurological:      Mental Status: She is alert.   Genitourinary:     General: Normal vulva.         Laboratory               No results for input(s): NTPROBNP in the last 72 hours.             Assessment & Plan  Assessment:  Not in labor.   Membrane status: intact.   Fetal well-being: normal.   1. 39 yo  with IUP at 36.6  2. Cat I FHR tracing  3. Previous c/s  4. Abdominal pain    Plan:  1. Discussed current presentation with patient. At this time, no additional cervical exam is warranted as pain has not increased and this is more consistent with irritability. Discussed adequate hydration with patient at this time and monitoring for labor symptoms.  2. We briefly discussed TOLAC and that in some cases, cervical dilation does not occur and she might need repeat  section. She is open to this but would like to try out of curiosity.   3. D/C home, keep follow up with [facility] tomorrow and [place] on Monday.          ANDREEA Gimenez

## 2023-05-17 NOTE — PROGRESS NOTES
1504: Pt is a G2P! At 36.6 weeks today, pt presents to L&D with c/o painful Uc's for past week, with increasing strength today. Pt reports +FM, denies LOF/VB at this time. EFM and TOCO applied, VS taken, pt comfortable in bed at this time.     1515: SVE as charted    1525: This RN updated YAIR HEATONM on pt status/SVE/FHT. POC discussed.    1625: Sherwin JENKINS at bedside, POC discussed, discharge orders received, see orders for details.    1645:  Discharge instructions given including  & term labor precautions, UC's, ROM, VB, abn FM, when to return to L&D, f/u with [facility], pelvic rest and modified bedrest. Questions answered at length. Pt verbalized understanding and agreement with POC and discharge. Discharge instructions signed.

## 2023-05-22 ENCOUNTER — ROUTINE PRENATAL (OUTPATIENT)
Dept: OBGYN | Facility: CLINIC | Age: 38
End: 2023-05-22
Payer: MEDICARE

## 2023-05-22 VITALS — DIASTOLIC BLOOD PRESSURE: 74 MMHG | BODY MASS INDEX: 27.44 KG/M2 | SYSTOLIC BLOOD PRESSURE: 122 MMHG | WEIGHT: 150 LBS

## 2023-05-22 DIAGNOSIS — Z98.891 PREVIOUS CESAREAN SECTION: ICD-10-CM

## 2023-05-22 DIAGNOSIS — O09.523 MULTIGRAVIDA OF ADVANCED MATERNAL AGE IN THIRD TRIMESTER: ICD-10-CM

## 2023-05-22 PROCEDURE — 0502F SUBSEQUENT PRENATAL CARE: CPT | Performed by: OBSTETRICS & GYNECOLOGY

## 2023-05-22 PROCEDURE — 3074F SYST BP LT 130 MM HG: CPT | Performed by: OBSTETRICS & GYNECOLOGY

## 2023-05-22 PROCEDURE — 3078F DIAST BP <80 MM HG: CPT | Performed by: OBSTETRICS & GYNECOLOGY

## 2023-05-22 ASSESSMENT — FIBROSIS 4 INDEX: FIB4 SCORE: 0.62

## 2023-05-25 ENCOUNTER — APPOINTMENT (OUTPATIENT)
Dept: ADMISSIONS | Facility: MEDICAL CENTER | Age: 38
End: 2023-05-25
Attending: OBSTETRICS & GYNECOLOGY
Payer: MEDICARE

## 2023-05-30 ENCOUNTER — PRE-ADMISSION TESTING (OUTPATIENT)
Dept: ADMISSIONS | Facility: MEDICAL CENTER | Age: 38
End: 2023-05-30
Attending: OBSTETRICS & GYNECOLOGY
Payer: MEDICARE

## 2023-05-30 RX ORDER — LEVOTHYROXINE SODIUM 0.12 MG/1
125 TABLET ORAL
COMMUNITY
End: 2023-10-13

## 2023-06-01 ENCOUNTER — ROUTINE PRENATAL (OUTPATIENT)
Dept: OBGYN | Facility: CLINIC | Age: 38
End: 2023-06-01
Payer: MEDICARE

## 2023-06-01 ENCOUNTER — ANESTHESIA EVENT (OUTPATIENT)
Dept: OBGYN | Facility: MEDICAL CENTER | Age: 38
End: 2023-06-01
Payer: MEDICARE

## 2023-06-01 VITALS — WEIGHT: 149.8 LBS | DIASTOLIC BLOOD PRESSURE: 73 MMHG | SYSTOLIC BLOOD PRESSURE: 129 MMHG | BODY MASS INDEX: 27.4 KG/M2

## 2023-06-01 DIAGNOSIS — O09.523 MULTIGRAVIDA OF ADVANCED MATERNAL AGE IN THIRD TRIMESTER: ICD-10-CM

## 2023-06-01 DIAGNOSIS — Z98.891 PREVIOUS CESAREAN SECTION: ICD-10-CM

## 2023-06-01 LAB
APPEARANCE UR: CLEAR
BILIRUB UR STRIP-MCNC: NORMAL MG/DL
COLOR UR AUTO: NORMAL
GLUCOSE UR STRIP.AUTO-MCNC: NEGATIVE MG/DL
KETONES UR STRIP.AUTO-MCNC: NORMAL MG/DL
LEUKOCYTE ESTERASE UR QL STRIP.AUTO: NEGATIVE
NITRITE UR QL STRIP.AUTO: NEGATIVE
PH UR STRIP.AUTO: 6 [PH] (ref 5–8)
PROT UR QL STRIP: NEGATIVE MG/DL
RBC UR QL AUTO: NEGATIVE
SP GR UR STRIP.AUTO: 1.03
UROBILINOGEN UR STRIP-MCNC: NORMAL MG/DL

## 2023-06-01 PROCEDURE — 81002 URINALYSIS NONAUTO W/O SCOPE: CPT | Performed by: OBSTETRICS & GYNECOLOGY

## 2023-06-01 PROCEDURE — 3074F SYST BP LT 130 MM HG: CPT | Performed by: OBSTETRICS & GYNECOLOGY

## 2023-06-01 PROCEDURE — 0502F SUBSEQUENT PRENATAL CARE: CPT | Performed by: OBSTETRICS & GYNECOLOGY

## 2023-06-01 PROCEDURE — 3078F DIAST BP <80 MM HG: CPT | Performed by: OBSTETRICS & GYNECOLOGY

## 2023-06-01 ASSESSMENT — FIBROSIS 4 INDEX: FIB4 SCORE: 0.62

## 2023-06-01 NOTE — ANESTHESIA PREPROCEDURE EVALUATION
Case: [id number] Date/Time: 23 1145    Procedure:  SECTION, REPEAT    Pre-op diagnosis:       PREVIOUS  SECTION      39+1 WEEKS    Location: [facility] OR 01 / SURGERY LABOR AND DELIVERY    Surgeons: Renetta Baig M.D.        37 yo F  w/ kenia danlos type 3, and bipolar disorder    Relevant Problems   ENDO   (positive) Hypothyroidism       Physical Exam    Airway   Mallampati: I  TM distance: >3 FB  Neck ROM: full       Cardiovascular - normal exam  Rhythm: regular  Rate: normal  (-) murmur     Dental - normal exam           Pulmonary - normal exam  Breath sounds clear to auscultation     Abdominal    Neurological - normal exam                 Anesthesia Plan    ASA 2       Plan - spinal   Neuraxial block will be primary anesthetic                Postoperative Plan: Postoperative administration of opioids is intended.    Pertinent diagnostic labs and testing reviewed    Informed Consent:    Anesthetic plan and risks discussed with patient.    Use of blood products discussed with: patient whom consented to blood products.

## 2023-06-02 ENCOUNTER — ANESTHESIA (OUTPATIENT)
Dept: OBGYN | Facility: MEDICAL CENTER | Age: 38
End: 2023-06-02
Payer: MEDICARE

## 2023-06-02 ENCOUNTER — HOSPITAL ENCOUNTER (INPATIENT)
Facility: MEDICAL CENTER | Age: 38
LOS: 4 days | End: 2023-06-06
Attending: OBSTETRICS & GYNECOLOGY | Admitting: OBSTETRICS & GYNECOLOGY
Payer: MEDICARE

## 2023-06-02 ENCOUNTER — ANESTHESIA EVENT (OUTPATIENT)
Dept: OBGYN | Facility: MEDICAL CENTER | Age: 38
End: 2023-06-02
Payer: MEDICARE

## 2023-06-02 LAB
ABO GROUP BLD: NORMAL
ALBUMIN SERPL BCP-MCNC: 2.5 G/DL (ref 3.2–4.9)
ALBUMIN SERPL BCP-MCNC: 3 G/DL (ref 3.2–4.9)
ALBUMIN/GLOB SERPL: 1.1 G/DL
ALBUMIN/GLOB SERPL: 1.4 G/DL
ALP SERPL-CCNC: 160 U/L (ref 30–99)
ALP SERPL-CCNC: 193 U/L (ref 30–99)
ALT SERPL-CCNC: 10 U/L (ref 2–50)
ALT SERPL-CCNC: 11 U/L (ref 2–50)
ANION GAP SERPL CALC-SCNC: 13 MMOL/L (ref 7–16)
ANION GAP SERPL CALC-SCNC: 9 MMOL/L (ref 7–16)
APTT PPP: 28.8 SEC (ref 24.7–36)
AST SERPL-CCNC: 16 U/L (ref 12–45)
AST SERPL-CCNC: 23 U/L (ref 12–45)
BARCODED ABORH UBTYP: 5100
BARCODED ABORH UBTYP: 9500
BARCODED ABORH UBTYP: 9500
BARCODED PRD CODE UBPRD: NORMAL
BARCODED UNIT NUM UBUNT: NORMAL
BASOPHILS # BLD AUTO: 0.2 % (ref 0–1.8)
BASOPHILS # BLD: 0.02 K/UL (ref 0–0.12)
BASOPHILS # BLD: 0.03 K/UL (ref 0–0.12)
BASOPHILS # BLD: 0.05 K/UL (ref 0–0.12)
BILIRUB SERPL-MCNC: 0.4 MG/DL (ref 0.1–1.5)
BILIRUB SERPL-MCNC: 0.5 MG/DL (ref 0.1–1.5)
BLD GP AB SCN SERPL QL: NORMAL
BUN SERPL-MCNC: 7 MG/DL (ref 8–22)
BUN SERPL-MCNC: 8 MG/DL (ref 8–22)
CALCIUM ALBUM COR SERPL-MCNC: 8.3 MG/DL (ref 8.5–10.5)
CALCIUM ALBUM COR SERPL-MCNC: 8.5 MG/DL (ref 8.5–10.5)
CALCIUM SERPL-MCNC: 7.3 MG/DL (ref 8.5–10.5)
CALCIUM SERPL-MCNC: 7.5 MG/DL (ref 8.5–10.5)
CHLORIDE SERPL-SCNC: 106 MMOL/L (ref 96–112)
CHLORIDE SERPL-SCNC: 108 MMOL/L (ref 96–112)
CO2 SERPL-SCNC: 20 MMOL/L (ref 20–33)
CO2 SERPL-SCNC: 21 MMOL/L (ref 20–33)
COMPONENT R 8504R: NORMAL
CREAT SERPL-MCNC: 0.28 MG/DL (ref 0.5–1.4)
CREAT SERPL-MCNC: 0.38 MG/DL (ref 0.5–1.4)
EOSINOPHIL # BLD AUTO: 0.01 K/UL (ref 0–0.51)
EOSINOPHIL # BLD AUTO: 0.05 K/UL (ref 0–0.51)
EOSINOPHIL # BLD AUTO: 0.07 K/UL (ref 0–0.51)
EOSINOPHIL NFR BLD: 0 % (ref 0–6.9)
EOSINOPHIL NFR BLD: 0.3 % (ref 0–6.9)
EOSINOPHIL NFR BLD: 0.8 % (ref 0–6.9)
ERYTHROCYTE [DISTWIDTH] IN BLOOD BY AUTOMATED COUNT: 44.9 FL (ref 35.9–50)
ERYTHROCYTE [DISTWIDTH] IN BLOOD BY AUTOMATED COUNT: 46.8 FL (ref 35.9–50)
ERYTHROCYTE [DISTWIDTH] IN BLOOD BY AUTOMATED COUNT: 48.3 FL (ref 35.9–50)
FIBRINOGEN PPP-MCNC: 323 MG/DL (ref 215–460)
GFR SERPLBLD CREATININE-BSD FMLA CKD-EPI: 131 ML/MIN/1.73 M 2
GFR SERPLBLD CREATININE-BSD FMLA CKD-EPI: 141 ML/MIN/1.73 M 2
GLOBULIN SER CALC-MCNC: 2.2 G/DL (ref 1.9–3.5)
GLOBULIN SER CALC-MCNC: 2.3 G/DL (ref 1.9–3.5)
GLUCOSE SERPL-MCNC: 109 MG/DL (ref 65–99)
GLUCOSE SERPL-MCNC: 89 MG/DL (ref 65–99)
HCT VFR BLD AUTO: 30.1 % (ref 37–47)
HCT VFR BLD AUTO: 33.6 % (ref 37–47)
HCT VFR BLD AUTO: 40.9 % (ref 37–47)
HGB BLD-MCNC: 10 G/DL (ref 12–16)
HGB BLD-MCNC: 11.1 G/DL (ref 12–16)
HGB BLD-MCNC: 13.8 G/DL (ref 12–16)
HOLDING TUBE BB 8507: NORMAL
IMM GRANULOCYTES # BLD AUTO: 0.07 K/UL (ref 0–0.11)
IMM GRANULOCYTES # BLD AUTO: 0.08 K/UL (ref 0–0.11)
IMM GRANULOCYTES # BLD AUTO: 0.17 K/UL (ref 0–0.11)
IMM GRANULOCYTES NFR BLD AUTO: 0.5 % (ref 0–0.9)
IMM GRANULOCYTES NFR BLD AUTO: 0.7 % (ref 0–0.9)
IMM GRANULOCYTES NFR BLD AUTO: 0.8 % (ref 0–0.9)
INR PPP: 1.18 (ref 0.87–1.13)
LYMPHOCYTES # BLD AUTO: 1.06 K/UL (ref 1–4.8)
LYMPHOCYTES # BLD AUTO: 1.39 K/UL (ref 1–4.8)
LYMPHOCYTES # BLD AUTO: 1.42 K/UL (ref 1–4.8)
LYMPHOCYTES NFR BLD: 15.5 % (ref 22–41)
LYMPHOCYTES NFR BLD: 4.5 % (ref 22–41)
LYMPHOCYTES NFR BLD: 8.6 % (ref 22–41)
MCH RBC QN AUTO: 29.9 PG (ref 27–33)
MCH RBC QN AUTO: 30.2 PG (ref 27–33)
MCH RBC QN AUTO: 30.4 PG (ref 27–33)
MCHC RBC AUTO-ENTMCNC: 33 G/DL (ref 32.2–35.5)
MCHC RBC AUTO-ENTMCNC: 33.2 G/DL (ref 32.2–35.5)
MCHC RBC AUTO-ENTMCNC: 33.7 G/DL (ref 32.2–35.5)
MCV RBC AUTO: 88.7 FL (ref 81.4–97.8)
MCV RBC AUTO: 91.5 FL (ref 81.4–97.8)
MCV RBC AUTO: 91.6 FL (ref 81.4–97.8)
MONOCYTES # BLD AUTO: 0.93 K/UL (ref 0–0.85)
MONOCYTES # BLD AUTO: 1.07 K/UL (ref 0–0.85)
MONOCYTES # BLD AUTO: 1.46 K/UL (ref 0–0.85)
MONOCYTES NFR BLD AUTO: 10.3 % (ref 0–13.4)
MONOCYTES NFR BLD AUTO: 6.2 % (ref 0–13.4)
MONOCYTES NFR BLD AUTO: 6.5 % (ref 0–13.4)
NEUTROPHILS # BLD AUTO: 13.91 K/UL (ref 1.82–7.42)
NEUTROPHILS # BLD AUTO: 20.98 K/UL (ref 1.82–7.42)
NEUTROPHILS # BLD AUTO: 6.51 K/UL (ref 1.82–7.42)
NEUTROPHILS NFR BLD: 72.4 % (ref 44–72)
NEUTROPHILS NFR BLD: 83.9 % (ref 44–72)
NEUTROPHILS NFR BLD: 88.4 % (ref 44–72)
NRBC # BLD AUTO: 0 K/UL
NRBC BLD-RTO: 0 /100 WBC (ref 0–0.2)
PLATELET # BLD AUTO: 130 K/UL (ref 164–446)
PLATELET # BLD AUTO: 140 K/UL (ref 164–446)
PLATELET # BLD AUTO: 144 K/UL (ref 164–446)
PMV BLD AUTO: 11.4 FL (ref 9–12.9)
PMV BLD AUTO: 11.6 FL (ref 9–12.9)
PMV BLD AUTO: 11.7 FL (ref 9–12.9)
POTASSIUM SERPL-SCNC: 3.8 MMOL/L (ref 3.6–5.5)
POTASSIUM SERPL-SCNC: 4 MMOL/L (ref 3.6–5.5)
PRODUCT TYPE UPROD: NORMAL
PROT SERPL-MCNC: 4.8 G/DL (ref 6–8.2)
PROT SERPL-MCNC: 5.2 G/DL (ref 6–8.2)
PROTHROMBIN TIME: 14.9 SEC (ref 12–14.6)
RBC # BLD AUTO: 3.29 M/UL (ref 4.2–5.4)
RBC # BLD AUTO: 3.67 M/UL (ref 4.2–5.4)
RBC # BLD AUTO: 4.61 M/UL (ref 4.2–5.4)
RH BLD: NORMAL
SODIUM SERPL-SCNC: 136 MMOL/L (ref 135–145)
SODIUM SERPL-SCNC: 141 MMOL/L (ref 135–145)
T PALLIDUM AB SER QL IA: NORMAL
UNIT STATUS USTAT: NORMAL
WBC # BLD AUTO: 16.6 K/UL (ref 4.8–10.8)
WBC # BLD AUTO: 23.7 K/UL (ref 4.8–10.8)
WBC # BLD AUTO: 9 K/UL (ref 4.8–10.8)

## 2023-06-02 PROCEDURE — 160029 HCHG SURGERY MINUTES - 1ST 30 MINS LEVEL 4: Performed by: OBSTETRICS & GYNECOLOGY

## 2023-06-02 PROCEDURE — 160046 HCHG PACU - 1ST 60 MINS PHASE II: Performed by: OBSTETRICS & GYNECOLOGY

## 2023-06-02 PROCEDURE — 700111 HCHG RX REV CODE 636 W/ 250 OVERRIDE (IP): Performed by: STUDENT IN AN ORGANIZED HEALTH CARE EDUCATION/TRAINING PROGRAM

## 2023-06-02 PROCEDURE — 160025 RECOVERY II MINUTES (STATS): Performed by: OBSTETRICS & GYNECOLOGY

## 2023-06-02 PROCEDURE — 86900 BLOOD TYPING SEROLOGIC ABO: CPT

## 2023-06-02 PROCEDURE — 80053 COMPREHEN METABOLIC PANEL: CPT

## 2023-06-02 PROCEDURE — 59510 CESAREAN DELIVERY: CPT | Performed by: OBSTETRICS & GYNECOLOGY

## 2023-06-02 PROCEDURE — 86850 RBC ANTIBODY SCREEN: CPT

## 2023-06-02 PROCEDURE — 160036 HCHG PACU - EA ADDL 30 MINS PHASE I: Performed by: OBSTETRICS & GYNECOLOGY

## 2023-06-02 PROCEDURE — 160035 HCHG PACU - 1ST 60 MINS PHASE I: Performed by: OBSTETRICS & GYNECOLOGY

## 2023-06-02 PROCEDURE — 30233N1 TRANSFUSION OF NONAUTOLOGOUS RED BLOOD CELLS INTO PERIPHERAL VEIN, PERCUTANEOUS APPROACH: ICD-10-PCS | Performed by: OBSTETRICS & GYNECOLOGY

## 2023-06-02 PROCEDURE — 36415 COLL VENOUS BLD VENIPUNCTURE: CPT

## 2023-06-02 PROCEDURE — 160009 HCHG ANES TIME/MIN: Performed by: OBSTETRICS & GYNECOLOGY

## 2023-06-02 PROCEDURE — 700101 HCHG RX REV CODE 250: Performed by: STUDENT IN AN ORGANIZED HEALTH CARE EDUCATION/TRAINING PROGRAM

## 2023-06-02 PROCEDURE — 302790 HCHG STAT ANTEPARTUM CARE, DAILY

## 2023-06-02 PROCEDURE — 01961 ANES CESAREAN DELIVERY ONLY: CPT | Performed by: STUDENT IN AN ORGANIZED HEALTH CARE EDUCATION/TRAINING PROGRAM

## 2023-06-02 PROCEDURE — 160041 HCHG SURGERY MINUTES - EA ADDL 1 MIN LEVEL 4: Performed by: OBSTETRICS & GYNECOLOGY

## 2023-06-02 PROCEDURE — P9016 RBC LEUKOCYTES REDUCED: HCPCS

## 2023-06-02 PROCEDURE — 0W3R7ZZ CONTROL BLEEDING IN GENITOURINARY TRACT, VIA NATURAL OR ARTIFICIAL OPENING: ICD-10-PCS | Performed by: OBSTETRICS & GYNECOLOGY

## 2023-06-02 PROCEDURE — A9270 NON-COVERED ITEM OR SERVICE: HCPCS | Performed by: STUDENT IN AN ORGANIZED HEALTH CARE EDUCATION/TRAINING PROGRAM

## 2023-06-02 PROCEDURE — 700102 HCHG RX REV CODE 250 W/ 637 OVERRIDE(OP): Performed by: OBSTETRICS & GYNECOLOGY

## 2023-06-02 PROCEDURE — 36430 TRANSFUSION BLD/BLD COMPNT: CPT

## 2023-06-02 PROCEDURE — 86923 COMPATIBILITY TEST ELECTRIC: CPT

## 2023-06-02 PROCEDURE — 700102 HCHG RX REV CODE 250 W/ 637 OVERRIDE(OP)

## 2023-06-02 PROCEDURE — 160048 HCHG OR STATISTICAL LEVEL 1-5: Performed by: OBSTETRICS & GYNECOLOGY

## 2023-06-02 PROCEDURE — 85730 THROMBOPLASTIN TIME PARTIAL: CPT

## 2023-06-02 PROCEDURE — 700105 HCHG RX REV CODE 258: Performed by: STUDENT IN AN ORGANIZED HEALTH CARE EDUCATION/TRAINING PROGRAM

## 2023-06-02 PROCEDURE — A9270 NON-COVERED ITEM OR SERVICE: HCPCS | Performed by: OBSTETRICS & GYNECOLOGY

## 2023-06-02 PROCEDURE — 700111 HCHG RX REV CODE 636 W/ 250 OVERRIDE (IP): Performed by: OBSTETRICS & GYNECOLOGY

## 2023-06-02 PROCEDURE — 00940 ANES VAGINAL PX NOS: CPT | Mod: 59 | Performed by: STUDENT IN AN ORGANIZED HEALTH CARE EDUCATION/TRAINING PROGRAM

## 2023-06-02 PROCEDURE — 59514 CESAREAN DELIVERY ONLY: CPT | Mod: 80 | Performed by: OBSTETRICS & GYNECOLOGY

## 2023-06-02 PROCEDURE — 700102 HCHG RX REV CODE 250 W/ 637 OVERRIDE(OP): Performed by: STUDENT IN AN ORGANIZED HEALTH CARE EDUCATION/TRAINING PROGRAM

## 2023-06-02 PROCEDURE — 110371 HCHG SHELL REV 272: Performed by: OBSTETRICS & GYNECOLOGY

## 2023-06-02 PROCEDURE — 700101 HCHG RX REV CODE 250

## 2023-06-02 PROCEDURE — 0UC97ZZ EXTIRPATION OF MATTER FROM UTERUS, VIA NATURAL OR ARTIFICIAL OPENING: ICD-10-PCS | Performed by: OBSTETRICS & GYNECOLOGY

## 2023-06-02 PROCEDURE — 160002 HCHG RECOVERY MINUTES (STAT): Performed by: OBSTETRICS & GYNECOLOGY

## 2023-06-02 PROCEDURE — 85025 COMPLETE CBC W/AUTO DIFF WBC: CPT

## 2023-06-02 PROCEDURE — A9270 NON-COVERED ITEM OR SERVICE: HCPCS

## 2023-06-02 PROCEDURE — 86901 BLOOD TYPING SEROLOGIC RH(D): CPT

## 2023-06-02 PROCEDURE — 770002 HCHG ROOM/CARE - OB PRIVATE (112)

## 2023-06-02 PROCEDURE — C1755 CATHETER, INTRASPINAL: HCPCS | Performed by: OBSTETRICS & GYNECOLOGY

## 2023-06-02 PROCEDURE — 59160 D & C AFTER DELIVERY: CPT | Performed by: OBSTETRICS & GYNECOLOGY

## 2023-06-02 PROCEDURE — 85384 FIBRINOGEN ACTIVITY: CPT

## 2023-06-02 PROCEDURE — 85610 PROTHROMBIN TIME: CPT

## 2023-06-02 PROCEDURE — 700105 HCHG RX REV CODE 258: Performed by: OBSTETRICS & GYNECOLOGY

## 2023-06-02 PROCEDURE — 86780 TREPONEMA PALLIDUM: CPT

## 2023-06-02 PROCEDURE — 160047 HCHG PACU  - EA ADDL 30 MINS PHASE II: Performed by: OBSTETRICS & GYNECOLOGY

## 2023-06-02 RX ORDER — SODIUM CHLORIDE, SODIUM LACTATE, POTASSIUM CHLORIDE, CALCIUM CHLORIDE 600; 310; 30; 20 MG/100ML; MG/100ML; MG/100ML; MG/100ML
INJECTION, SOLUTION INTRAVENOUS CONTINUOUS
Status: DISCONTINUED | OUTPATIENT
Start: 2023-06-02 | End: 2023-06-04

## 2023-06-02 RX ORDER — DEXAMETHASONE SODIUM PHOSPHATE 4 MG/ML
INJECTION, SOLUTION INTRA-ARTICULAR; INTRALESIONAL; INTRAMUSCULAR; INTRAVENOUS; SOFT TISSUE PRN
Status: DISCONTINUED | OUTPATIENT
Start: 2023-06-02 | End: 2023-06-02 | Stop reason: SURG

## 2023-06-02 RX ORDER — OXYCODONE HCL 5 MG/5 ML
10 SOLUTION, ORAL ORAL
Status: DISCONTINUED | OUTPATIENT
Start: 2023-06-02 | End: 2023-06-03

## 2023-06-02 RX ORDER — CARBOPROST TROMETHAMINE 250 UG/ML
250 INJECTION, SOLUTION INTRAMUSCULAR ONCE
Status: COMPLETED | OUTPATIENT
Start: 2023-06-02 | End: 2023-06-02

## 2023-06-02 RX ORDER — EPHEDRINE SULFATE 50 MG/ML
5 INJECTION, SOLUTION INTRAVENOUS
Status: DISCONTINUED | OUTPATIENT
Start: 2023-06-02 | End: 2023-06-03 | Stop reason: HOSPADM

## 2023-06-02 RX ORDER — CEFAZOLIN SODIUM 1 G/3ML
INJECTION, POWDER, FOR SOLUTION INTRAMUSCULAR; INTRAVENOUS PRN
Status: DISCONTINUED | OUTPATIENT
Start: 2023-06-02 | End: 2023-06-02 | Stop reason: SURG

## 2023-06-02 RX ORDER — SODIUM CHLORIDE, SODIUM GLUCONATE, SODIUM ACETATE, POTASSIUM CHLORIDE AND MAGNESIUM CHLORIDE 526; 502; 368; 37; 30 MG/100ML; MG/100ML; MG/100ML; MG/100ML; MG/100ML
1500 INJECTION, SOLUTION INTRAVENOUS ONCE
Status: COMPLETED | OUTPATIENT
Start: 2023-06-02 | End: 2023-06-02

## 2023-06-02 RX ORDER — TRANEXAMIC ACID 100 MG/ML
INJECTION, SOLUTION INTRAVENOUS PRN
Status: DISCONTINUED | OUTPATIENT
Start: 2023-06-02 | End: 2023-06-02 | Stop reason: SURG

## 2023-06-02 RX ORDER — MORPHINE SULFATE 0.5 MG/ML
INJECTION, SOLUTION EPIDURAL; INTRATHECAL; INTRAVENOUS
Status: COMPLETED | OUTPATIENT
Start: 2023-06-02 | End: 2023-06-02

## 2023-06-02 RX ORDER — CARBOPROST TROMETHAMINE 250 UG/ML
INJECTION, SOLUTION INTRAMUSCULAR PRN
Status: DISCONTINUED | OUTPATIENT
Start: 2023-06-02 | End: 2023-06-02 | Stop reason: SURG

## 2023-06-02 RX ORDER — SODIUM CHLORIDE, SODIUM GLUCONATE, SODIUM ACETATE, POTASSIUM CHLORIDE AND MAGNESIUM CHLORIDE 526; 502; 368; 37; 30 MG/100ML; MG/100ML; MG/100ML; MG/100ML; MG/100ML
INJECTION, SOLUTION INTRAVENOUS
Status: DISCONTINUED | OUTPATIENT
Start: 2023-06-02 | End: 2023-06-02 | Stop reason: SURG

## 2023-06-02 RX ORDER — ONDANSETRON 2 MG/ML
INJECTION INTRAMUSCULAR; INTRAVENOUS PRN
Status: DISCONTINUED | OUTPATIENT
Start: 2023-06-02 | End: 2023-06-02 | Stop reason: SURG

## 2023-06-02 RX ORDER — LEVOTHYROXINE SODIUM 0.12 MG/1
125 TABLET ORAL
Status: DISCONTINUED | OUTPATIENT
Start: 2023-06-02 | End: 2023-06-03 | Stop reason: HOSPADM

## 2023-06-02 RX ORDER — MEPERIDINE HYDROCHLORIDE 25 MG/ML
12.5 INJECTION INTRAMUSCULAR; INTRAVENOUS; SUBCUTANEOUS
Status: DISCONTINUED | OUTPATIENT
Start: 2023-06-02 | End: 2023-06-03

## 2023-06-02 RX ORDER — SODIUM CHLORIDE, SODIUM LACTATE, POTASSIUM CHLORIDE, CALCIUM CHLORIDE 600; 310; 30; 20 MG/100ML; MG/100ML; MG/100ML; MG/100ML
INJECTION, SOLUTION INTRAVENOUS CONTINUOUS
Status: DISCONTINUED | OUTPATIENT
Start: 2023-06-02 | End: 2023-06-03

## 2023-06-02 RX ORDER — METHYLERGONOVINE MALEATE 0.2 MG/ML
INJECTION INTRAVENOUS PRN
Status: DISCONTINUED | OUTPATIENT
Start: 2023-06-02 | End: 2023-06-02 | Stop reason: SURG

## 2023-06-02 RX ORDER — CEFAZOLIN SODIUM 1 G/3ML
2 INJECTION, POWDER, FOR SOLUTION INTRAMUSCULAR; INTRAVENOUS ONCE
Status: DISCONTINUED | OUTPATIENT
Start: 2023-06-02 | End: 2023-06-02 | Stop reason: HOSPADM

## 2023-06-02 RX ORDER — ONDANSETRON 2 MG/ML
4 INJECTION INTRAMUSCULAR; INTRAVENOUS
Status: DISCONTINUED | OUTPATIENT
Start: 2023-06-02 | End: 2023-06-03

## 2023-06-02 RX ORDER — MISOPROSTOL 200 UG/1
TABLET ORAL
Status: COMPLETED
Start: 2023-06-02 | End: 2023-06-02

## 2023-06-02 RX ORDER — BUPIVACAINE HYDROCHLORIDE 7.5 MG/ML
INJECTION, SOLUTION INTRASPINAL
Status: COMPLETED | OUTPATIENT
Start: 2023-06-02 | End: 2023-06-02

## 2023-06-02 RX ORDER — HYDROMORPHONE HYDROCHLORIDE 1 MG/ML
0.4 INJECTION, SOLUTION INTRAMUSCULAR; INTRAVENOUS; SUBCUTANEOUS
Status: DISCONTINUED | OUTPATIENT
Start: 2023-06-02 | End: 2023-06-03

## 2023-06-02 RX ORDER — ZIPRASIDONE HYDROCHLORIDE 80 MG/1
80 CAPSULE ORAL
Status: DISCONTINUED | OUTPATIENT
Start: 2023-06-02 | End: 2023-06-03 | Stop reason: HOSPADM

## 2023-06-02 RX ORDER — DIPHENHYDRAMINE HYDROCHLORIDE 50 MG/ML
12.5 INJECTION INTRAMUSCULAR; INTRAVENOUS
Status: DISCONTINUED | OUTPATIENT
Start: 2023-06-02 | End: 2023-06-02

## 2023-06-02 RX ORDER — LIDOCAINE HYDROCHLORIDE 20 MG/ML
INJECTION, SOLUTION EPIDURAL; INFILTRATION; INTRACAUDAL; PERINEURAL PRN
Status: DISCONTINUED | OUTPATIENT
Start: 2023-06-02 | End: 2023-06-02 | Stop reason: SURG

## 2023-06-02 RX ORDER — METOCLOPRAMIDE HYDROCHLORIDE 5 MG/ML
10 INJECTION INTRAMUSCULAR; INTRAVENOUS ONCE
Status: DISCONTINUED | OUTPATIENT
Start: 2023-06-02 | End: 2023-06-02 | Stop reason: HOSPADM

## 2023-06-02 RX ORDER — DIPHENHYDRAMINE HYDROCHLORIDE 50 MG/ML
12.5 INJECTION INTRAMUSCULAR; INTRAVENOUS
Status: DISCONTINUED | OUTPATIENT
Start: 2023-06-02 | End: 2023-06-03

## 2023-06-02 RX ORDER — OXYTOCIN 10 [USP'U]/ML
10 INJECTION, SOLUTION INTRAMUSCULAR; INTRAVENOUS
Status: DISCONTINUED | OUTPATIENT
Start: 2023-06-02 | End: 2023-06-03

## 2023-06-02 RX ORDER — OXYCODONE HCL 5 MG/5 ML
5 SOLUTION, ORAL ORAL
Status: COMPLETED | OUTPATIENT
Start: 2023-06-02 | End: 2023-06-02

## 2023-06-02 RX ORDER — PHENYLEPHRINE HYDROCHLORIDE 10 MG/ML
INJECTION, SOLUTION INTRAMUSCULAR; INTRAVENOUS; SUBCUTANEOUS PRN
Status: DISCONTINUED | OUTPATIENT
Start: 2023-06-02 | End: 2023-06-02 | Stop reason: SURG

## 2023-06-02 RX ORDER — OXYTOCIN 10 [USP'U]/ML
INJECTION, SOLUTION INTRAMUSCULAR; INTRAVENOUS PRN
Status: DISCONTINUED | OUTPATIENT
Start: 2023-06-02 | End: 2023-06-02 | Stop reason: SURG

## 2023-06-02 RX ORDER — OXYCODONE HCL 5 MG/5 ML
10 SOLUTION, ORAL ORAL
Status: COMPLETED | OUTPATIENT
Start: 2023-06-02 | End: 2023-06-02

## 2023-06-02 RX ORDER — DOXYCYCLINE 100 MG/1
200 TABLET ORAL ONCE
Status: COMPLETED | OUTPATIENT
Start: 2023-06-02 | End: 2023-06-02

## 2023-06-02 RX ORDER — EPHEDRINE SULFATE 50 MG/ML
5 INJECTION, SOLUTION INTRAVENOUS
Status: DISCONTINUED | OUTPATIENT
Start: 2023-06-02 | End: 2023-06-03

## 2023-06-02 RX ORDER — HYDROMORPHONE HYDROCHLORIDE 1 MG/ML
0.1 INJECTION, SOLUTION INTRAMUSCULAR; INTRAVENOUS; SUBCUTANEOUS
Status: DISCONTINUED | OUTPATIENT
Start: 2023-06-02 | End: 2023-06-03

## 2023-06-02 RX ORDER — HYDROMORPHONE HYDROCHLORIDE 1 MG/ML
0.2 INJECTION, SOLUTION INTRAMUSCULAR; INTRAVENOUS; SUBCUTANEOUS
Status: DISCONTINUED | OUTPATIENT
Start: 2023-06-02 | End: 2023-06-03

## 2023-06-02 RX ORDER — OXYCODONE HCL 5 MG/5 ML
5 SOLUTION, ORAL ORAL
Status: DISCONTINUED | OUTPATIENT
Start: 2023-06-02 | End: 2023-06-03

## 2023-06-02 RX ORDER — CARBOPROST TROMETHAMINE 250 UG/ML
INJECTION, SOLUTION INTRAMUSCULAR
Status: COMPLETED
Start: 2023-06-02 | End: 2023-06-02

## 2023-06-02 RX ORDER — GABAPENTIN 300 MG/1
300 CAPSULE ORAL
Status: DISCONTINUED | OUTPATIENT
Start: 2023-06-02 | End: 2023-06-03 | Stop reason: HOSPADM

## 2023-06-02 RX ORDER — CITRIC ACID/SODIUM CITRATE 334-500MG
30 SOLUTION, ORAL ORAL ONCE
Status: COMPLETED | OUTPATIENT
Start: 2023-06-02 | End: 2023-06-02

## 2023-06-02 RX ORDER — SODIUM CHLORIDE, SODIUM LACTATE, POTASSIUM CHLORIDE, CALCIUM CHLORIDE 600; 310; 30; 20 MG/100ML; MG/100ML; MG/100ML; MG/100ML
INJECTION, SOLUTION INTRAVENOUS
Status: DISCONTINUED | OUTPATIENT
Start: 2023-06-02 | End: 2023-06-02 | Stop reason: SURG

## 2023-06-02 RX ORDER — METHYLERGONOVINE MALEATE 0.2 MG/ML
0.2 INJECTION INTRAVENOUS ONCE
Status: COMPLETED | OUTPATIENT
Start: 2023-06-02 | End: 2023-06-02

## 2023-06-02 RX ORDER — DIPHENHYDRAMINE HYDROCHLORIDE 50 MG/ML
INJECTION INTRAMUSCULAR; INTRAVENOUS
Status: ACTIVE
Start: 2023-06-02 | End: 2023-06-03

## 2023-06-02 RX ORDER — SODIUM CHLORIDE, SODIUM LACTATE, POTASSIUM CHLORIDE, CALCIUM CHLORIDE 600; 310; 30; 20 MG/100ML; MG/100ML; MG/100ML; MG/100ML
INJECTION, SOLUTION INTRAVENOUS ONCE
Status: COMPLETED | OUTPATIENT
Start: 2023-06-02 | End: 2023-06-03

## 2023-06-02 RX ORDER — TRANEXAMIC ACID 100 MG/ML
INJECTION, SOLUTION INTRAVENOUS
Status: COMPLETED
Start: 2023-06-02 | End: 2023-06-02

## 2023-06-02 RX ADMIN — CARBOPROST TROMETHAMINE 250 MCG: 250 INJECTION, SOLUTION INTRAMUSCULAR at 15:31

## 2023-06-02 RX ADMIN — CEFAZOLIN 2 G: 1 INJECTION, POWDER, FOR SOLUTION INTRAMUSCULAR; INTRAVENOUS at 12:23

## 2023-06-02 RX ADMIN — FENTANYL CITRATE 50 MCG: 50 INJECTION, SOLUTION INTRAMUSCULAR; INTRAVENOUS at 16:08

## 2023-06-02 RX ADMIN — ONDANSETRON 4 MG: 2 INJECTION INTRAMUSCULAR; INTRAVENOUS at 16:54

## 2023-06-02 RX ADMIN — SODIUM CHLORIDE, SODIUM GLUCONATE, SODIUM ACETATE, POTASSIUM CHLORIDE AND MAGNESIUM CHLORIDE: 526; 502; 368; 37; 30 INJECTION, SOLUTION INTRAVENOUS at 12:17

## 2023-06-02 RX ADMIN — METHYLERGONOVINE MALEATE 0.2 MG: 0.2 INJECTION, SOLUTION INTRAMUSCULAR; INTRAVENOUS at 14:49

## 2023-06-02 RX ADMIN — PHENYLEPHRINE HYDROCHLORIDE 50 MCG: 10 INJECTION INTRAVENOUS at 16:08

## 2023-06-02 RX ADMIN — HYDROMORPHONE HYDROCHLORIDE 0.4 MG: 1 INJECTION, SOLUTION INTRAMUSCULAR; INTRAVENOUS; SUBCUTANEOUS at 23:06

## 2023-06-02 RX ADMIN — PHENYLEPHRINE HYDROCHLORIDE 50 MCG: 10 INJECTION INTRAVENOUS at 16:44

## 2023-06-02 RX ADMIN — DIPHENHYDRAMINE HYDROCHLORIDE 12.5 MG: 50 INJECTION, SOLUTION INTRAMUSCULAR; INTRAVENOUS at 19:57

## 2023-06-02 RX ADMIN — HYDROMORPHONE HYDROCHLORIDE 0.4 MG: 1 INJECTION, SOLUTION INTRAMUSCULAR; INTRAVENOUS; SUBCUTANEOUS at 15:12

## 2023-06-02 RX ADMIN — SODIUM CITRATE AND CITRIC ACID MONOHYDRATE 30 ML: 500; 334 SOLUTION ORAL at 11:43

## 2023-06-02 RX ADMIN — LEVOTHYROXINE SODIUM 125 MCG: 0.12 TABLET ORAL at 21:15

## 2023-06-02 RX ADMIN — SODIUM CHLORIDE, POTASSIUM CHLORIDE, SODIUM LACTATE AND CALCIUM CHLORIDE: 600; 310; 30; 20 INJECTION, SOLUTION INTRAVENOUS at 15:58

## 2023-06-02 RX ADMIN — ROCURONIUM BROMIDE 35 MG: 10 INJECTION, SOLUTION INTRAVENOUS at 16:08

## 2023-06-02 RX ADMIN — PHENYLEPHRINE HYDROCHLORIDE 100 MCG: 10 INJECTION INTRAVENOUS at 16:37

## 2023-06-02 RX ADMIN — MORPHINE SULFATE 100 MCG: 0.5 INJECTION, SOLUTION EPIDURAL; INTRATHECAL; INTRAVENOUS at 12:21

## 2023-06-02 RX ADMIN — DOXYCYCLINE 200 MG: 100 TABLET, FILM COATED ORAL at 21:15

## 2023-06-02 RX ADMIN — MISOPROSTOL 800 MCG: 200 TABLET ORAL at 15:29

## 2023-06-02 RX ADMIN — OXYTOCIN 125 ML/HR: 10 INJECTION, SOLUTION INTRAMUSCULAR; INTRAVENOUS at 17:48

## 2023-06-02 RX ADMIN — LIDOCAINE HYDROCHLORIDE 80 MG: 20 INJECTION, SOLUTION EPIDURAL; INFILTRATION; INTRACAUDAL at 16:08

## 2023-06-02 RX ADMIN — CARBOPROST TROMETHAMINE 250 MCG: 250 INJECTION, SOLUTION INTRAMUSCULAR at 16:49

## 2023-06-02 RX ADMIN — DEXAMETHASONE SODIUM PHOSPHATE 4 MG: 4 INJECTION INTRA-ARTICULAR; INTRALESIONAL; INTRAMUSCULAR; INTRAVENOUS; SOFT TISSUE at 16:12

## 2023-06-02 RX ADMIN — FENTANYL CITRATE 25 MCG: 50 INJECTION, SOLUTION INTRAMUSCULAR; INTRAVENOUS at 17:06

## 2023-06-02 RX ADMIN — SUGAMMADEX 200 MG: 100 INJECTION, SOLUTION INTRAVENOUS at 16:54

## 2023-06-02 RX ADMIN — ZIPRASIDONE HYDROCHLORIDE 80 MG: 80 CAPSULE ORAL at 21:15

## 2023-06-02 RX ADMIN — FAMOTIDINE 20 MG: 10 INJECTION INTRAVENOUS at 11:43

## 2023-06-02 RX ADMIN — PROPOFOL 150 MG: 10 INJECTION, EMULSION INTRAVENOUS at 16:08

## 2023-06-02 RX ADMIN — OXYCODONE HYDROCHLORIDE 5 MG: 5 SOLUTION ORAL at 14:12

## 2023-06-02 RX ADMIN — METHYLERGONOVINE MALEATE 200 MCG: 0.2 INJECTION, SOLUTION INTRAMUSCULAR; INTRAVENOUS at 16:54

## 2023-06-02 RX ADMIN — TRANEXAMIC ACID 1000 MG: 100 INJECTION, SOLUTION INTRAVENOUS at 16:52

## 2023-06-02 RX ADMIN — TRANEXAMIC ACID 1000 MG: 100 INJECTION, SOLUTION INTRAVENOUS at 15:11

## 2023-06-02 RX ADMIN — FENTANYL CITRATE 10 MCG: 50 INJECTION, SOLUTION INTRAMUSCULAR; INTRAVENOUS at 12:21

## 2023-06-02 RX ADMIN — HYDROMORPHONE HYDROCHLORIDE 0.2 MG: 1 INJECTION, SOLUTION INTRAMUSCULAR; INTRAVENOUS; SUBCUTANEOUS at 18:05

## 2023-06-02 RX ADMIN — SODIUM CHLORIDE, SODIUM GLUCONATE, SODIUM ACETATE, POTASSIUM CHLORIDE AND MAGNESIUM CHLORIDE 1500 ML: 526; 502; 368; 37; 30 INJECTION, SOLUTION INTRAVENOUS at 11:00

## 2023-06-02 RX ADMIN — OXYTOCIN 125 ML/HR: 10 INJECTION, SOLUTION INTRAMUSCULAR; INTRAVENOUS at 13:31

## 2023-06-02 RX ADMIN — PHENYLEPHRINE HYDROCHLORIDE 0.2 MCG/KG/MIN: 10 INJECTION INTRAVENOUS at 12:23

## 2023-06-02 RX ADMIN — GABAPENTIN 300 MG: 300 CAPSULE ORAL at 21:15

## 2023-06-02 RX ADMIN — CEFAZOLIN 2 G: 1 INJECTION, POWDER, FOR SOLUTION INTRAMUSCULAR; INTRAVENOUS at 16:12

## 2023-06-02 RX ADMIN — HYDROMORPHONE HYDROCHLORIDE 0.2 MG: 1 INJECTION, SOLUTION INTRAMUSCULAR; INTRAVENOUS; SUBCUTANEOUS at 14:31

## 2023-06-02 RX ADMIN — BUPIVACAINE HYDROCHLORIDE IN DEXTROSE 1.6 ML: 7.5 INJECTION, SOLUTION SUBARACHNOID at 12:21

## 2023-06-02 RX ADMIN — OXYTOCIN 20 UNITS: 10 INJECTION, SOLUTION INTRAMUSCULAR; INTRAVENOUS at 12:49

## 2023-06-02 ASSESSMENT — PATIENT HEALTH QUESTIONNAIRE - PHQ9
SUM OF ALL RESPONSES TO PHQ9 QUESTIONS 1 AND 2: 0
1. LITTLE INTEREST OR PLEASURE IN DOING THINGS: NOT AT ALL
2. FEELING DOWN, DEPRESSED, IRRITABLE, OR HOPELESS: NOT AT ALL

## 2023-06-02 ASSESSMENT — LIFESTYLE VARIABLES
TOTAL SCORE: 0
HAVE PEOPLE ANNOYED YOU BY CRITICIZING YOUR DRINKING: NO
EVER HAD A DRINK FIRST THING IN THE MORNING TO STEADY YOUR NERVES TO GET RID OF A HANGOVER: NO
TOTAL SCORE: 0
TOTAL SCORE: 0
EVER FELT BAD OR GUILTY ABOUT YOUR DRINKING: NO
EVER_SMOKED: YES
ON A TYPICAL DAY WHEN YOU DRINK ALCOHOL HOW MANY DRINKS DO YOU HAVE: 0
AVERAGE NUMBER OF DAYS PER WEEK YOU HAVE A DRINK CONTAINING ALCOHOL: 0
HAVE YOU EVER FELT YOU SHOULD CUT DOWN ON YOUR DRINKING: NO
ALCOHOL_USE: NO
HOW MANY TIMES IN THE PAST YEAR HAVE YOU HAD 5 OR MORE DRINKS IN A DAY: 0
CONSUMPTION TOTAL: NEGATIVE

## 2023-06-02 ASSESSMENT — PAIN DESCRIPTION - PAIN TYPE
TYPE: ACUTE PAIN
TYPE: ACUTE PAIN

## 2023-06-02 ASSESSMENT — PAIN SCALES - GENERAL: PAINLEVEL: 0 - NO PAIN

## 2023-06-02 ASSESSMENT — FIBROSIS 4 INDEX: FIB4 SCORE: 0.95

## 2023-06-02 NOTE — PROGRESS NOTES
1045: Pt arrives to L&D for scheduled repeat  section. Pre-op begun.  1150: Pre-op complete.   1200: Report given to Yun SEGAL and Darlin SEGAL. Care relinquished.

## 2023-06-02 NOTE — ANESTHESIA PREPROCEDURE EVALUATION
Case: [id number] Date/Time: 23 1145    Procedure:  SECTION, REPEAT    Pre-op diagnosis:       PREVIOUS  SECTION      39+1 WEEKS    Location: [facility] OR 01 / SURGERY LABOR AND DELIVERY    Surgeons: Renetta Baig M.D.        39 yo F  w/ kenia danlos type 3, and bipolar disorder.  CS this afternoon, taking back for D&C    Relevant Problems   ENDO   (positive) Hypothyroidism       Physical Exam    Airway   Mallampati: I  TM distance: >3 FB  Neck ROM: full       Cardiovascular - normal exam  Rhythm: regular  Rate: normal  (-) murmur     Dental - normal exam           Pulmonary - normal exam  Breath sounds clear to auscultation     Abdominal    Neurological - normal exam                 Anesthesia Plan    ASA 4 (Post CS hemorrhage)- EMERGENT   ASA physical status emergent criteria: acute hemorrhage    Plan - general       Airway plan will be ETT              Postoperative Plan: Postoperative administration of opioids is intended.    Pertinent diagnostic labs and testing reviewed    Informed Consent:    Anesthetic plan and risks discussed with patient.    Use of blood products discussed with: patient whom consented to blood products.

## 2023-06-02 NOTE — PROGRESS NOTES
1150- report received from PHILIPP Godinez. All preop items complete, pre op meds given per Gretchen  1215- Pt ambulatory to OR, patients mom, Jessica to be in OR with her.  1248- delivery of vigorous female infant. 8/8 APGARS.   1307- transferred infant to  due to patient adopting it out to family  1324- transferred to PACU with patients mom, Jessica.  1455- called Dr. Baig due to increase pt bleeding, multiple large clots, boggy fundus.  1508- Dr. De Souza at bedside at do bimanual. Removed multiple large clots, orders received.   1530- Dr. Stein bedside to eval bleeding. Indicated we need a D & C.   - Consented patient for procedure, placed 2nd IV, radha labs.   1557- in OR for procedure.  1620- vag prep complete.  1650- Procedure complete, placed Bakri Balloon.   1709- transferred patient to PACU in stable condition. Orders to start 1 unit blood and draw labs after.

## 2023-06-02 NOTE — PROGRESS NOTES
"Called to see patient secondary to continued ongoing blood loss.  EBL approximately 1600 at this time.  The patient has received 1 g of TXA, 800 mcg of Cytotec, 1 amp of Methergine, 1 amp of Hemabate.    Pain is well controlled.  She denies lightheadedness or dizziness.    BP (!) 143/73   Pulse 81   Temp 35.9 °C (96.7 °F) (Temporal)   Resp 16   Ht 1.6 m (5' 3\")   Wt 67.9 kg (149 lb 12.8 oz)   SpO2 95%      Bimanual exam performed.  Uterus is firm, but approximately 400 cc of additional clot was removed.    Limited bedside ultrasound performed.  Uterus with 4.89 cm area of clot in the fundus.    A/P: Postpartum hemorrhage status post repeat .  Stat CBC, CMP, PT, PTT, fibrinogen ordered.  Type and cross for 2 units.  Patient consented for urgent D&C, Bakri balloon placement.  Risks include bleeding, blood transfusion, infection, uterine perforation, damage to surrounding organs including bowel, bladder, ureters, nerves, vessels, possible hysterectomy, unexpected complications, anesthesia risks.  "

## 2023-06-02 NOTE — OP REPORT
PREOPERATIVE DIAGNOSIS:  History of prior , desires repeat   AMA  Hypothyroidism  Sandra Danlos Syndrome  Bipolar Disorder  Depression    POSTOPERATIVE DIAGNOSIS:  Same    PROCEDURE: Repeat Low Transverse  Section via Pfannensteil    SURGEON: Renetta Baig MD    ASSISTANT: Radha Stein MD    ANESTHESIA: Braeden Woods MD    COMPLICATIONS: none    EBL: 600  cc    FLUIDS: See anesthesia note    URINE OUTPUT: 350 cc    INDICATIONS: Briefly, this is a 38 y.o.  at 39w1d who presents for repeat . Risks of surgery discussed including bleeding, infection, and injury to bowel or bladder. All questions answered and patient desires to proceed with repeat . She declines bilateral tubal ligation.    FINDINGS: Viable male infant in cephalic presentation with clear fluid, apgars 8 and 8, weight 7lbs 15oz. Normal uterus, tubes, ovaries.     PROCEDURE IN DETAIL: The patient was taken to the operating room where  spinal anesthesia was found to be adequate.  She was then prepped and draped in the normal sterile fashion in the dorsal supine position with a leftward hip tilt.  A Pfannenstiel skin incision was then made with the scalpel and carried through to the underlying layer of fascia, which was nicked in the midline and the incision was extended laterally with the Ramirez scissors.  The superior aspect of the fascial incision was then grasped with Kocher clamps, elevated, and the underlying rectus muscles dissected off sharply.  Attention was then turned to the inferior aspect of this incision which, in a similar fashion, was grasped, tented up with Kocher clamps, and the rectus muscle was dissected off sharply.  The rectus muscles were then  in the midline, and the peritoneum was identified and entered bluntly.  The peritoneal incision was then extended superiorly and inferiorly with good visualization of the bladder.  The bladder blade was then inserted and the  vesicouterine peritoneum identified, grasped with pickups, and entered sharply with the Metzenbaum scissors.  This incision was then extended laterally and the bladder flap was created digitally.  The bladder blade was then reinserted and the lower uterine segment incised in a transverse fashion with the scalpel.  The uterine incision was then extended laterally with fingers.  The bladder blade was removed and the infant's head was delivered atraumatically.  The remainder of the infant was delivered without difficulty.  The infant's nose and mouth were bulb suctioned on the field.  The cord was clamped and cut after approximately 30 seconds.  The infant was then handed off to the awaiting attendant.  The placenta was then removed manually, the uterus exteriorized, and cleared of all clot and debris.  The uterine incision was repaired with 0 Vicryl in a running, locked fashion.  The incision appeared hemostatic.  The uterus was returned to the abdomen and the gutters were cleared of all clot and debris.  The fascia was approximated with 0 Vicryl in a running fashion.  The subcuticular fat was irrigated.  Zhanna's fascia was closed with interrupted sutures of 2-0 Plain Gut.  The skin was closed with 4-0 Monocryl.  The patient tolerated the procedure well.  Sponge lap and needle counts were correct x3.  The patient was taken to the recovery room in stable condition.    Renetta Baig M.D.

## 2023-06-02 NOTE — ANESTHESIA TIME REPORT
Anesthesia Start and Stop Event Times     Date Time Event    6/2/2023 1145 Ready for Procedure     1217 Anesthesia Start     1325 Anesthesia Stop        Responsible Staff  06/02/23    Name Role Begin End    Braeden Woods M.D. Anesth 1217 1325        Overtime Reason:  no overtime (within assigned shift)    Comments:

## 2023-06-02 NOTE — DISCHARGE PLANNING
Continue with Puramino   After 2 weeks, wean pepcid to every other day for 2 weeks and then stop it  Can trial standard formula introduction after 6months of age and monitor symptoms.  Advance to whole milk if she does well  Follow up if needed     Office contact number: [phone]  Outpatient lab Location: 3rd floor, [address]  Same day X ray: Please go to outpatient registration in ground floor for guidance  Scheduling Image: Please call [phone] to schedule any imaging Discharge Planning Assessment Post Partum    Reason for Referral: Adopting infant to brother.  History of THC, depression, and bipolar  Address: [address] [place] [address]  Phone: [phone]  Type of Living Situation: living with mother-Jessica Parra and daughter  Mom Diagnosis: Pregnancy,   Baby Diagnosis: [place]-39.1 weeks  Primary Language: English    Name of Baby: Unknown  Father of the Baby: Simone Eugene  Involved in baby’s care? No.  Per family the FOB will be signing relinquishment paperwork on Monday  Contact Information: N/A    Adoptive Parents: Jamaal and Renetta Luna (MOB's brother and sister-in-law).  Per Jamaal, they have an  who is assisting with adoption and parents will be signing relinquishment paperwork next week.  MOB provided consent allowing adoptive parents to visit infant in NBN.    [facility]: [facility]  Mom's PCP:  Dr. Yeimi Cochran  PCP for new baby: Adoptive parents will have Pediatrician in [place]    Support System: MOB's family: Mother, Brother & Sister-in-law (Adopting parents)  Coping/Bonding between mother & baby: MOB states she does not want to see baby at this time.  She is aware that she can change her mind and see baby at any time.  Source of Feeding: formula  Supplies for Infant: Adoptive parents have supplies    Mom's Insurance: Medicaid and Medicare  Baby Covered on Insurance: Yes  Mother Employed/School: No  Other children in the home/names & ages: 2 year old daughter: Sharon Eugene (: 20) that lives with MOB and Grandmother    Financial Hardship/Income: No   Mom's Mental status: alert and oriented  Services used prior to admit: Medicaid    CPS History: No  Psychiatric History: history of bipolar and depression-on medication: Geodon  Domestic Violence History: Unknown  Drug/ETOH History: history of THC    Resources Provided: None  Referrals Made: None     Clearance for Discharge: MOB signed the Parent Authorization form allowing  infant to discharge to her brother and sister-in-law: Alin Luna who will be adopting infant.  Family is working with an  and the FOB will be signing relinquishment paperwork on Monday and the MOB will be signing on Tuesday.  Infant is cleared to discharge to Alin Luna once medically cleared.

## 2023-06-02 NOTE — ANESTHESIA PROCEDURE NOTES
Spinal Block    Date/Time: 6/2/2023 12:21 PM    Performed by: Braeden Woods M.D.  Authorized by: Braeden Woods M.D.    Patient Location:  OR  Start Time:  6/2/2023 12:21 PM  End Time:  6/2/2023 12:21 PM  Reason for Block: primary anesthetic    patient identified, IV checked, site marked, risks and benefits discussed, surgical consent, monitors and equipment checked, pre-op evaluation and timeout performed    Patient Position:  Sitting  Prep: ChloraPrep, patient draped and sterile technique    Monitoring:  Blood pressure, continuous pulse oximetry and heart rate  Approach:  Midline  Location:  L4-5  Injection Technique:  Single-shot  Skin infiltration:  Lidocaine  Strength:  1%  Dose:  3ml  Needle Type:  Pencan  Needle Gauge:  25 G  CSF flowing pre/post injection:  Yes  Sensory Level:  T4

## 2023-06-02 NOTE — ANESTHESIA POSTPROCEDURE EVALUATION
Patient: Kristen Luna    Procedure Summary     Date: 23 Room / Location: LND OR 01 / SURGERY LABOR AND DELIVERY    Anesthesia Start: 1217 Anesthesia Stop: 1325    Procedure:  SECTION, REPEAT Diagnosis:       S/P       (PREVIOUS  SECTION)      (39+1 WEEKS)    Surgeons: Renetta Baig M.D. Responsible Provider: Braeden Woods M.D.    Anesthesia Type: spinal ASA Status: 2          Final Anesthesia Type: spinal  Last vitals  BP   Blood Pressure: 117/79    Temp   36.3 °C (97.4 °F)    Pulse   73   Resp   16    SpO2   96 %      Anesthesia Post Evaluation    Patient location during evaluation: PACU  Patient participation: complete - patient participated  Level of consciousness: awake and alert    Airway patency: patent  Anesthetic complications: no  Cardiovascular status: hemodynamically stable  Respiratory status: acceptable  Hydration status: euvolemic    PONV: none          No notable events documented.     Nurse Pain Score: 0 (NPRS)

## 2023-06-02 NOTE — DISCHARGE PLANNING
Discharge Planning Assessment Post Partum    Reason for Referral: History of anxiety  Address: [address] [place] [address] [place]ED[place] Gutierrez [address]  Phone: [phone]  Type of Living Situation: living with FOB  Mom Diagnosis: Pregnancy,   Baby Diagnosis: [place]-38.4 weeks  Primary Language: English    Name of Baby: Layla Rajan (: 23)  Father of the Baby: Ru Rajan  Involved in baby’s care? Yes  Contact Information: [phone]    Prenatal Care: Yes-Dr. Lara  Mom's PCP: No PCP listed  PCP for new baby: Pediatrician list provided to parents    Support System: FOB  Coping/Bonding between mother & baby: Yes  Source of Feeding: breast feeding  Supplies for Infant: prepared for infant    Mom's Insurance: [place]  Baby Covered on Insurance:Yes  Mother Employed/School:   Other children in the home/names & ages: first baby    Financial Hardship/Income: No   Mom's Mental status: alert and oriented  Services used prior to admit: None    CPS History: No  Psychiatric History: history of anxiety.  Discussed with parents and provided a list of counseling and support group resources specializing in maternal mental health  Domestic Violence History: No  Drug/ETOH History: No    Resources Provided: pediatrician list, children and family resource list, post partum support and counseling resources, diaper bank assistance resources, WIC information, and childcare resources provided  Referrals Made: diaper bank referral provided     Clearance for Discharge: Infant is cleared to discharge home with parents once medically cleared

## 2023-06-02 NOTE — H&P
OB H&P:    CC: Scheduled      HPI:  Ms. Kristen Luna is a 38 y.o.  @ 39w1d by LMP 2023 presents for scheduled  for repeat.     Contractions: No   Loss of fluid: No   Vaginal bleeding: No   Fetal movement: present    Pregnancy complicated by AMA, MDD, hypothyroidism, EDS.  PNC with [facility]    PNL:  Blood Type O+, Rubella immune, HIV neg, TrepAb neg, HBsAg NR, GC/CT neg/neg  1 HR GTT: 133  3 HR GTT: n/a  GBS -      ROS:  Const: denies fevers, general concerns  CV/resp: reports no concerns  GI: denies abd pain, GI concerns  : see HPI  Neuro: denies HA/vision changes    OB History    Para Term  AB Living   2 1 1     1   SAB IAB Ectopic Molar Multiple Live Births           0 1      # Outcome Date GA Lbr Ernesto/2nd Weight Sex Delivery Anes PTL Lv   2 Current            1 Term 20 40w1d  3.81 kg (8 lb 6.4 oz) F CS-LTranv Spinal N DAREK      Complications: Failure to Progress in First Stage       GYN: denies STIs, no cervical procedures    Past Medical History:   Diagnosis Date    Arrhythmia     SVT no episodes since     Bipolar affective disorder (HCC)     Breath shortness     at noc [id number]; primary MD aware    Complete placenta previa nos or without hemorrhage, second trimester 2023    Complete previa on 20 week US with [facility]- has repeat scheduled    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    Depression     Gastroparesis     Heart burn     Hypothyroid     Pain     Left knee 5/10    Pregnant     PTSD (post-traumatic stress disorder)        Past Surgical History:   Procedure Laterality Date    IN  DELIVERY ONLY N/A 2020    Procedure:  SECTION, PRIMARY;  Surgeon: Malinda Murdock M.D.;  Location: LABOR AND DELIVERY;  Service: Labor and Delivery    OTHER  2018    laparoscopic cholecystectomy 2018    KNEE ARTHROSCOPY  2010    Performed by KRYSTIAN COSTA at [facility]    SYNOVECTOMY   2010    Performed by KRYSTIAN COSTA at SURGERY [facility] ORS    MENISCECTOMY, KNEE, MEDIAL  2010    Performed by KRYSTIAN COSTA at SURGERY [facility] ORS    KNEE ARTHROSCOPY  10/2009    left    DENTAL EXTRACTION(S)  2007    wisdom teeth       No current facility-administered medications on file prior to encounter.     Current Outpatient Medications on File Prior to Encounter   Medication Sig Dispense Refill    EUTHYROX 125 MCG Tab Take 1 Tablet by mouth every day. (Patient not taking: Reported on 2023) 30 Tablet 1    diphenhydrAMINE HCl, Sleep, (UNISOM SLEEPGELS PO) Take  by mouth as needed.      Prenatal Multivit-Min-Fe-FA (PRE- FORMULA PO) Take  by mouth.      gabapentin (NEURONTIN) 300 MG Cap Take 300 mg by mouth at bedtime. Takes 300 mg in the morning and 600 mg at bedtime  Indications: Restless Leg Syndrome         Family History   Problem Relation Age of Onset    Depression Mother     Heart Disease Mother     Bipolar disorder Maternal Aunt     Diabetes Other        Social History     Tobacco Use    Smoking status: Former     Packs/day: 0.50     Years: 1.50     Pack years: 0.75     Types: Cigarettes     Quit date: 2009     Years since quittin.7    Smokeless tobacco: Never   Vaping Use    Vaping Use: Never used   Substance Use Topics    Alcohol use: No    Drug use: Not Currently     Types: Marijuana, Inhaled         PE:  Vitals:    23 1029   BP: 117/79   Pulse: 73   Resp: 16   Temp: 36.3 °C (97.4 °F)   TempSrc: Temporal   SpO2: 96%       GEN: AAO, NAD  HEENT: NC/AT, EOMI, neck supple  CV: rrr, no m/r/g  Resp: CTAB, no w/r/r  Abd: soft, gravid, NT  Ext: NT, no peripheral edema  Derm: no visible lesions or rashes  Neuro: grossly intact    SVE: not examined  FHT: Baseline 125/moderate variability/+ accels/ - decels  Hans: Contractions every 5-7 mins    A/P: 38 y.o.  @ 39w1d by LMP  presents for schedule  for repeat. Pregnancy complicated by  AMA, MDD, hypothyroidism, EDS.    - Admit to L&D  - Initiate routine intrapartum care  - Cat 1 tracing, continue to monitor EFM  - LR @ 125ml/h    Bette Seymour M.D.

## 2023-06-03 LAB
BASOPHILS # BLD AUTO: 0.1 % (ref 0–1.8)
BASOPHILS # BLD: 0.01 K/UL (ref 0–0.12)
EOSINOPHIL # BLD AUTO: 0.07 K/UL (ref 0–0.51)
EOSINOPHIL NFR BLD: 0.6 % (ref 0–6.9)
ERYTHROCYTE [DISTWIDTH] IN BLOOD BY AUTOMATED COUNT: 49.8 FL (ref 35.9–50)
HCT VFR BLD AUTO: 21.5 % (ref 37–47)
HGB BLD-MCNC: 7.2 G/DL (ref 12–16)
IMM GRANULOCYTES # BLD AUTO: 0.07 K/UL (ref 0–0.11)
IMM GRANULOCYTES NFR BLD AUTO: 0.6 % (ref 0–0.9)
LYMPHOCYTES # BLD AUTO: 1.6 K/UL (ref 1–4.8)
LYMPHOCYTES NFR BLD: 13.3 % (ref 22–41)
MCH RBC QN AUTO: 30.5 PG (ref 27–33)
MCHC RBC AUTO-ENTMCNC: 33.5 G/DL (ref 32.2–35.5)
MCV RBC AUTO: 91.1 FL (ref 81.4–97.8)
MONOCYTES # BLD AUTO: 1.38 K/UL (ref 0–0.85)
MONOCYTES NFR BLD AUTO: 11.5 % (ref 0–13.4)
NEUTROPHILS # BLD AUTO: 8.92 K/UL (ref 1.82–7.42)
NEUTROPHILS NFR BLD: 73.9 % (ref 44–72)
NRBC # BLD AUTO: 0 K/UL
NRBC BLD-RTO: 0 /100 WBC (ref 0–0.2)
PLATELET # BLD AUTO: 131 K/UL (ref 164–446)
PMV BLD AUTO: 10.8 FL (ref 9–12.9)
RBC # BLD AUTO: 2.36 M/UL (ref 4.2–5.4)
WBC # BLD AUTO: 12.1 K/UL (ref 4.8–10.8)

## 2023-06-03 PROCEDURE — 700102 HCHG RX REV CODE 250 W/ 637 OVERRIDE(OP)

## 2023-06-03 PROCEDURE — A9270 NON-COVERED ITEM OR SERVICE: HCPCS

## 2023-06-03 PROCEDURE — 36415 COLL VENOUS BLD VENIPUNCTURE: CPT

## 2023-06-03 PROCEDURE — 770002 HCHG ROOM/CARE - OB PRIVATE (112)

## 2023-06-03 PROCEDURE — 700102 HCHG RX REV CODE 250 W/ 637 OVERRIDE(OP): Performed by: OBSTETRICS & GYNECOLOGY

## 2023-06-03 PROCEDURE — 700102 HCHG RX REV CODE 250 W/ 637 OVERRIDE(OP): Performed by: STUDENT IN AN ORGANIZED HEALTH CARE EDUCATION/TRAINING PROGRAM

## 2023-06-03 PROCEDURE — A9270 NON-COVERED ITEM OR SERVICE: HCPCS | Performed by: STUDENT IN AN ORGANIZED HEALTH CARE EDUCATION/TRAINING PROGRAM

## 2023-06-03 PROCEDURE — 85025 COMPLETE CBC W/AUTO DIFF WBC: CPT

## 2023-06-03 PROCEDURE — 700111 HCHG RX REV CODE 636 W/ 250 OVERRIDE (IP): Performed by: OBSTETRICS & GYNECOLOGY

## 2023-06-03 PROCEDURE — A9270 NON-COVERED ITEM OR SERVICE: HCPCS | Performed by: OBSTETRICS & GYNECOLOGY

## 2023-06-03 PROCEDURE — 700105 HCHG RX REV CODE 258: Performed by: OBSTETRICS & GYNECOLOGY

## 2023-06-03 RX ORDER — DOCUSATE SODIUM 100 MG/1
100 CAPSULE, LIQUID FILLED ORAL 2 TIMES DAILY PRN
Status: DISCONTINUED | OUTPATIENT
Start: 2023-06-03 | End: 2023-06-03

## 2023-06-03 RX ORDER — LOPERAMIDE HYDROCHLORIDE 2 MG/1
2 CAPSULE ORAL 4 TIMES DAILY PRN
Status: DISCONTINUED | OUTPATIENT
Start: 2023-06-03 | End: 2023-06-06 | Stop reason: HOSPADM

## 2023-06-03 RX ORDER — OXYCODONE HYDROCHLORIDE 5 MG/1
5 TABLET ORAL EVERY 4 HOURS PRN
Status: DISCONTINUED | OUTPATIENT
Start: 2023-06-03 | End: 2023-06-06 | Stop reason: HOSPADM

## 2023-06-03 RX ORDER — ONDANSETRON 4 MG/1
4 TABLET, ORALLY DISINTEGRATING ORAL EVERY 6 HOURS PRN
Status: DISCONTINUED | OUTPATIENT
Start: 2023-06-04 | End: 2023-06-03

## 2023-06-03 RX ORDER — ONDANSETRON 2 MG/ML
INJECTION INTRAMUSCULAR; INTRAVENOUS
Status: ACTIVE
Start: 2023-06-03 | End: 2023-06-04

## 2023-06-03 RX ORDER — VITAMIN A ACETATE, BETA CAROTENE, ASCORBIC ACID, CHOLECALCIFEROL, .ALPHA.-TOCOPHEROL ACETATE, DL-, THIAMINE MONONITRATE, RIBOFLAVIN, NIACINAMIDE, PYRIDOXINE HYDROCHLORIDE, FOLIC ACID, CYANOCOBALAMIN, CALCIUM CARBONATE, FERROUS FUMARATE, ZINC OXIDE, CUPRIC OXIDE 3080; 12; 120; 400; 1; 1.84; 3; 20; 22; 920; 25; 200; 27; 10; 2 [IU]/1; UG/1; MG/1; [IU]/1; MG/1; MG/1; MG/1; MG/1; MG/1; [IU]/1; MG/1; MG/1; MG/1; MG/1; MG/1
1 TABLET, FILM COATED ORAL
Status: DISCONTINUED | OUTPATIENT
Start: 2023-06-03 | End: 2023-06-06 | Stop reason: HOSPADM

## 2023-06-03 RX ORDER — OXYCODONE HYDROCHLORIDE 10 MG/1
10 TABLET ORAL EVERY 4 HOURS PRN
Status: DISCONTINUED | OUTPATIENT
Start: 2023-06-03 | End: 2023-06-03

## 2023-06-03 RX ORDER — HYDROMORPHONE HYDROCHLORIDE 1 MG/ML
0.4 INJECTION, SOLUTION INTRAMUSCULAR; INTRAVENOUS; SUBCUTANEOUS
Status: DISCONTINUED | OUTPATIENT
Start: 2023-06-03 | End: 2023-06-03

## 2023-06-03 RX ORDER — ONDANSETRON 2 MG/ML
4 INJECTION INTRAMUSCULAR; INTRAVENOUS EVERY 6 HOURS PRN
Status: DISCONTINUED | OUTPATIENT
Start: 2023-06-03 | End: 2023-06-06 | Stop reason: HOSPADM

## 2023-06-03 RX ORDER — DIPHENHYDRAMINE HYDROCHLORIDE 50 MG/ML
25 INJECTION INTRAMUSCULAR; INTRAVENOUS EVERY 6 HOURS PRN
Status: DISCONTINUED | OUTPATIENT
Start: 2023-06-04 | End: 2023-06-03

## 2023-06-03 RX ORDER — ACETAMINOPHEN 500 MG
1000 TABLET ORAL EVERY 6 HOURS
Status: DISCONTINUED | OUTPATIENT
Start: 2023-06-04 | End: 2023-06-03

## 2023-06-03 RX ORDER — SODIUM CHLORIDE, SODIUM LACTATE, POTASSIUM CHLORIDE, CALCIUM CHLORIDE 600; 310; 30; 20 MG/100ML; MG/100ML; MG/100ML; MG/100ML
INJECTION, SOLUTION INTRAVENOUS PRN
Status: DISCONTINUED | OUTPATIENT
Start: 2023-06-03 | End: 2023-06-06 | Stop reason: HOSPADM

## 2023-06-03 RX ORDER — SIMETHICONE 125 MG
125 TABLET,CHEWABLE ORAL 4 TIMES DAILY PRN
Status: DISCONTINUED | OUTPATIENT
Start: 2023-06-03 | End: 2023-06-06 | Stop reason: HOSPADM

## 2023-06-03 RX ORDER — ONDANSETRON 4 MG/1
4 TABLET, ORALLY DISINTEGRATING ORAL EVERY 6 HOURS PRN
Status: DISCONTINUED | OUTPATIENT
Start: 2023-06-03 | End: 2023-06-06 | Stop reason: HOSPADM

## 2023-06-03 RX ORDER — ACETAMINOPHEN 500 MG
1000 TABLET ORAL EVERY 6 HOURS PRN
Status: DISCONTINUED | OUTPATIENT
Start: 2023-06-07 | End: 2023-06-05

## 2023-06-03 RX ORDER — OXYCODONE HYDROCHLORIDE 5 MG/1
5 TABLET ORAL EVERY 4 HOURS PRN
Status: DISCONTINUED | OUTPATIENT
Start: 2023-06-03 | End: 2023-06-03

## 2023-06-03 RX ORDER — ONDANSETRON 2 MG/ML
4 INJECTION INTRAMUSCULAR; INTRAVENOUS EVERY 6 HOURS PRN
Status: DISCONTINUED | OUTPATIENT
Start: 2023-06-04 | End: 2023-06-03

## 2023-06-03 RX ORDER — OXYCODONE HYDROCHLORIDE 5 MG/1
5 TABLET ORAL EVERY 4 HOURS PRN
Status: DISCONTINUED | OUTPATIENT
Start: 2023-06-04 | End: 2023-06-03

## 2023-06-03 RX ORDER — DIPHENHYDRAMINE HCL 25 MG
25 TABLET ORAL EVERY 6 HOURS PRN
Status: DISCONTINUED | OUTPATIENT
Start: 2023-06-04 | End: 2023-06-03

## 2023-06-03 RX ORDER — DIPHENHYDRAMINE HCL 25 MG
25 TABLET ORAL EVERY 6 HOURS PRN
Status: DISCONTINUED | OUTPATIENT
Start: 2023-06-03 | End: 2023-06-06 | Stop reason: HOSPADM

## 2023-06-03 RX ORDER — DOCUSATE SODIUM 100 MG/1
100 CAPSULE, LIQUID FILLED ORAL 2 TIMES DAILY PRN
Status: DISCONTINUED | OUTPATIENT
Start: 2023-06-03 | End: 2023-06-06 | Stop reason: HOSPADM

## 2023-06-03 RX ORDER — HYDROMORPHONE HYDROCHLORIDE 1 MG/ML
0.2 INJECTION, SOLUTION INTRAMUSCULAR; INTRAVENOUS; SUBCUTANEOUS
Status: DISCONTINUED | OUTPATIENT
Start: 2023-06-03 | End: 2023-06-03

## 2023-06-03 RX ORDER — OXYCODONE HYDROCHLORIDE 10 MG/1
10 TABLET ORAL EVERY 4 HOURS PRN
Status: DISCONTINUED | OUTPATIENT
Start: 2023-06-04 | End: 2023-06-03

## 2023-06-03 RX ORDER — OXYCODONE HYDROCHLORIDE 10 MG/1
10 TABLET ORAL EVERY 4 HOURS PRN
Status: DISCONTINUED | OUTPATIENT
Start: 2023-06-03 | End: 2023-06-06 | Stop reason: HOSPADM

## 2023-06-03 RX ORDER — DIPHENHYDRAMINE HYDROCHLORIDE 50 MG/ML
50 INJECTION INTRAMUSCULAR; INTRAVENOUS EVERY 6 HOURS PRN
Status: DISCONTINUED | OUTPATIENT
Start: 2023-06-03 | End: 2023-06-06 | Stop reason: HOSPADM

## 2023-06-03 RX ORDER — CALCIUM CARBONATE 500 MG/1
1000 TABLET, CHEWABLE ORAL EVERY 6 HOURS PRN
Status: DISCONTINUED | OUTPATIENT
Start: 2023-06-03 | End: 2023-06-06 | Stop reason: HOSPADM

## 2023-06-03 RX ORDER — ACETAMINOPHEN 500 MG
1000 TABLET ORAL EVERY 6 HOURS
Status: DISCONTINUED | OUTPATIENT
Start: 2023-06-04 | End: 2023-06-05

## 2023-06-03 RX ORDER — ACETAMINOPHEN 500 MG
1000 TABLET ORAL EVERY 6 HOURS
Status: DISCONTINUED | OUTPATIENT
Start: 2023-06-03 | End: 2023-06-03

## 2023-06-03 RX ORDER — ACETAMINOPHEN 500 MG
1000 TABLET ORAL EVERY 6 HOURS PRN
Status: DISCONTINUED | OUTPATIENT
Start: 2023-06-07 | End: 2023-06-03

## 2023-06-03 RX ADMIN — OXYCODONE HYDROCHLORIDE 10 MG: 10 TABLET ORAL at 08:30

## 2023-06-03 RX ADMIN — GABAPENTIN 300 MG: 300 CAPSULE ORAL at 21:11

## 2023-06-03 RX ADMIN — DIPHENHYDRAMINE HYDROCHLORIDE 25 MG: 25 TABLET ORAL at 23:03

## 2023-06-03 RX ADMIN — ACETAMINOPHEN 1000 MG: 500 TABLET, FILM COATED ORAL at 21:11

## 2023-06-03 RX ADMIN — OXYCODONE HYDROCHLORIDE 10 MG: 10 TABLET ORAL at 15:44

## 2023-06-03 RX ADMIN — ACETAMINOPHEN 1000 MG: 500 TABLET, FILM COATED ORAL at 08:20

## 2023-06-03 RX ADMIN — ACETAMINOPHEN 1000 MG: 500 TABLET, FILM COATED ORAL at 14:55

## 2023-06-03 RX ADMIN — LEVOTHYROXINE SODIUM 125 MCG: 0.12 TABLET ORAL at 21:11

## 2023-06-03 RX ADMIN — ZIPRASIDONE HYDROCHLORIDE 80 MG: 80 CAPSULE ORAL at 21:12

## 2023-06-03 RX ADMIN — OXYCODONE HYDROCHLORIDE 10 MG: 10 TABLET ORAL at 20:27

## 2023-06-03 RX ADMIN — LOPERAMIDE HYDROCHLORIDE 2 MG: 2 CAPSULE ORAL at 04:59

## 2023-06-03 RX ADMIN — DIPHENHYDRAMINE HYDROCHLORIDE 50 MG: 50 INJECTION, SOLUTION INTRAMUSCULAR; INTRAVENOUS at 15:44

## 2023-06-03 RX ADMIN — SODIUM CHLORIDE, POTASSIUM CHLORIDE, SODIUM LACTATE AND CALCIUM CHLORIDE: 600; 310; 30; 20 INJECTION, SOLUTION INTRAVENOUS at 02:08

## 2023-06-03 RX ADMIN — ONDANSETRON 4 MG: 2 INJECTION INTRAMUSCULAR; INTRAVENOUS at 13:00

## 2023-06-03 ASSESSMENT — PAIN DESCRIPTION - PAIN TYPE: TYPE: ACUTE PAIN

## 2023-06-03 NOTE — PROGRESS NOTES
"POD # 1 S/P REPEAT  COMPLICATED BY PPH, S/P SUCTION D&C    SUBJECTIVE: She is having some pain this morning. She thinks it's related to the Bakri balloon. Blood in Bakri balloon bag has been minimal overnight. She has not been ambulating. Grover catheter still in place.     OBJECTIVE:  /57   Pulse 78   Temp 37.3 °C (99.2 °F) (Temporal)   Resp 14   Ht 1.6 m (5' 3\")   Wt 67.9 kg (149 lb 12.8 oz)   SpO2 94%      GENERAL: Alert, in no apparent distress  PSYCHIATRIC: Appropriate affect, intact insight and judgement.  ABDOMEN: Soft, tender to palpation, fundus firm. Dressing dry and intact.  EXTREMITIES: no edema, calves NT    Recent Labs     /  1050 23  1550 23  2030   WBC 9.0 16.6* 23.7*   RBC 4.61 3.67* 3.29*   HEMOGLOBIN 13.8 11.1* 10.0*   HEMATOCRIT 40.9 33.6* 30.1*   MCV 88.7 91.6 91.5   MCH 29.9 30.2 30.4   RDW 44.9 46.8 48.3   PLATELETCT 130* 140* 144*   MPV 11.7 11.4 11.6   NEUTSPOLYS 72.40* 83.90* 88.40*   LYMPHOCYTES 15.50* 8.60* 4.50*   MONOCYTES 10.30 6.50 6.20   EOSINOPHILS 0.80 0.30 0.00   BASOPHILS 0.20 0.20 0.20        ASSESSMENT: POD # 1 S/P REPEAT  COMPLICATED BY PPH, S/P SUCTION D&C    PPH  - total blood loss including  and D&C was estimated to be about 3400mL  - s/p 1 unit of PRBC yesterday  - H&H stable this morning 10/30.1  - vitals signs stable  - blood in Bakri bag has been minimal overnight  - s/p uterotonics yesterday including methergine, pitocin, hemabate and cytotec  - Bakri balloon originally had 250 mL, deflated by 80 mL this morning, plan to continue to deflate today    POD#1  - pressure dressing is clean this morning, recommend removing grover and encouraging ambulation when Bakri is removed  - DVT prophylaxis- SCDs  - continue post operative care    PLAN: to transfer to post partum if vitals/bleeding continue to be stable after Bakri balloon is removed    Renetta Baig M.D.      "

## 2023-06-03 NOTE — ANESTHESIA POSTPROCEDURE EVALUATION
Patient: Kristen Luna    Procedure Summary     Date: 06/02/23 Room / Location: SURGERY LABOR AND DELIVERY    Anesthesia Start: 1558 Anesthesia Stop: 1711    Procedure: DILATION AND CURETTAGE (Vagina ) Diagnosis: (Postpartum Hemmorhage)    Surgeons: Renetta Baig M.D. Responsible Provider: Braeden Woods M.D.    Anesthesia Type: general ASA Status: 4 - Emergent          Final Anesthesia Type: general  Last vitals  BP   Blood Pressure: (!) 143/73    Temp   35.9 °C (96.7 °F)    Pulse   81   Resp   16    SpO2   95 %      Anesthesia Post Evaluation    Patient location during evaluation: PACU  Patient participation: complete - patient participated  Level of consciousness: awake and alert    Airway patency: patent  Anesthetic complications: no  Cardiovascular status: hemodynamically stable  Respiratory status: acceptable  Hydration status: euvolemic    PONV: none          No notable events documented.     Nurse Pain Score: 7 (NPRS)

## 2023-06-03 NOTE — PROGRESS NOTES
1900: Received bedside report from Yun SEGAL; PT is a , RN assuming care of patient; all questions answered; pt resting comfortably in bed with significant other at bedside; denies needs at this time; VS in stable condition; pt educated regarding call light system; call light and pt belongings in reach, pt encouraged to call RN for any needs, wants, desires or concerns. Whiteboard updated. Pitocin running at 125 mU and blood transfusion infusing.     : Jovanni GARCIA at bedside to evaluate pt. Pt cleared to go to antepartum.    2237: Pt out of PACU and transferred to antepartum     0330: Pt given IS and taught how to use it.

## 2023-06-03 NOTE — PROGRESS NOTES
Late entry/Summary of events due to patient care:  Supervision of and assistance with care provided t/o entire shift. Pt recovered uneventfully, chatting, good pain control, with a firm fundus and scant to light bleeding until fundal check revealed boggy uterus with heavy bleeding. Dr. Baig notified and methergine given. Bleeding remained moderate with some question of trailing membranes noted so request made for bimanual evacuation at the bedside. Dr. Baig in OR so Dr. Seymour came to bedside. Clots evacuated and TXA, Cytotec, and hemabate given. See MAR. PPH cart at bedside and US turned on. Dr. Stein came to bedside and performed US. Pads weighed (see flow sheet). Decision made to bring pt to OR for D&C and Bakkri balloon placement. Pt consented and brought to OR. D&C performed and Bakkri placed. EBL in  mL. Brought to PACU. Pt remained hemodynamically stable during both the c/s recovery/PPH and again S/P D&C. O2 weaned downt to 1L NC. Tolerating blood transfusion. Report to PHILIPP Beth at 1900.

## 2023-06-03 NOTE — ANESTHESIA TIME REPORT
Anesthesia Start and Stop Event Times     Date Time Event    6/2/2023 1558 Anesthesia Start     1711 Anesthesia Stop        Responsible Staff  06/02/23    Name Role Begin End    Braeden Woods M.D. Anesth 1558 1711        Overtime Reason:  no overtime (within assigned shift)    Comments:

## 2023-06-03 NOTE — OP REPORT
Operative Report -     Patient: Kristen Luna  MRN: [id number]     YOB: 1985   Age: 38 y.o.   Sex:female  Unit: LABOR & DELIVERY [facility]  Room/Bed: LDPACU/PACU 03  Location: [facility]            DATE OF OPERATION: 2023     Pre-operative Diagnosis: Postpartum hemorrhage, concern for retained blood clot or POC seen on bedside US at fundus  Post-operative Diagnosis: same   Procedure: Suction D&C with bimanual exam and Bakri balloon placement  Anesthesia: General   Antibiotics: none  Surgeon(s): Renetta Baig MD  Assistant(s): Radha Stein MD  Indications: Kristen is a 37yo G2 now  status post uncomplicated repeat .  In the PACU, she had a postpartum hemorrhage despite fundal massage with attempted bimanual exam with approximately 2 L blood loss. She received Hemabate, Methergine, and 800 mcg of Cytotec.  She also received 1 g of TXA. Bedside US did reveal a large blood clot in the fundus. At this time, she was consented for suction D&C. All questions were answered and consents were signed.    Findings:   Heterogeneous material seen in the fundus on bedside ultrasound, concern for large blood clot in the fundus.  After surgery, blood clot seemed a lot smaller at fundus  Bakri balloon placed with 250mL of fluid  Complications: none   Specimens: none   EBL: 800 mL     Procedure detail:   The patient was taken to the operating room and placed under general anesthesia. She was then prepped and draped in the normal sterile fashion in the dorsal lithotomy position.  Bimanual exam was done to evacuate clots and assess uterus.  The medium size curette was then placed inside the uterus under bedside US visualization with gentle curettage, attempting to curettage the fundus where the large clot was seen.  This was a little difficult due to the instrument not being long enough.  A large suction device was then used to suction the uterus and at the fundus with evacuation  of clots. The suction was turned on at the fundus at a suction of 50mm of Hg and turned while suctioning to evacuate clots. This was done twice.  On ultrasound, it seemed like the clot was much smaller.  At this time, it was decided to place the Bakri balloon.  The Bakri balloon was placed in the usual fashion and filled with 250 mL of fluid.  All instruments were then removed except the Bakri balloon. Sponge and instrument counts were correct at the end of the procedure and the patient was taken to recovery in guarded position. Plan to transfuse 1 unit of PRBC due to total calculated blood loss.     Renetta Baig M.D.

## 2023-06-04 LAB
BASOPHILS # BLD AUTO: 0.2 % (ref 0–1.8)
BASOPHILS # BLD: 0.02 K/UL (ref 0–0.12)
EOSINOPHIL # BLD AUTO: 0.1 K/UL (ref 0–0.51)
EOSINOPHIL NFR BLD: 1.1 % (ref 0–6.9)
ERYTHROCYTE [DISTWIDTH] IN BLOOD BY AUTOMATED COUNT: 49.3 FL (ref 35.9–50)
ERYTHROCYTE [DISTWIDTH] IN BLOOD BY AUTOMATED COUNT: 50.7 FL (ref 35.9–50)
HCT VFR BLD AUTO: 19.3 % (ref 37–47)
HCT VFR BLD AUTO: 21.8 % (ref 37–47)
HGB BLD-MCNC: 6.3 G/DL (ref 12–16)
HGB BLD-MCNC: 7.3 G/DL (ref 12–16)
IMM GRANULOCYTES # BLD AUTO: 0.07 K/UL (ref 0–0.11)
IMM GRANULOCYTES NFR BLD AUTO: 0.8 % (ref 0–0.9)
LYMPHOCYTES # BLD AUTO: 1.31 K/UL (ref 1–4.8)
LYMPHOCYTES NFR BLD: 14.8 % (ref 22–41)
MCH RBC QN AUTO: 29.9 PG (ref 27–33)
MCH RBC QN AUTO: 30.3 PG (ref 27–33)
MCHC RBC AUTO-ENTMCNC: 32.6 G/DL (ref 32.2–35.5)
MCHC RBC AUTO-ENTMCNC: 33.5 G/DL (ref 32.2–35.5)
MCV RBC AUTO: 90.5 FL (ref 81.4–97.8)
MCV RBC AUTO: 91.5 FL (ref 81.4–97.8)
MONOCYTES # BLD AUTO: 0.79 K/UL (ref 0–0.85)
MONOCYTES NFR BLD AUTO: 8.9 % (ref 0–13.4)
NEUTROPHILS # BLD AUTO: 6.58 K/UL (ref 1.82–7.42)
NEUTROPHILS NFR BLD: 74.2 % (ref 44–72)
NRBC # BLD AUTO: 0 K/UL
NRBC BLD-RTO: 0 /100 WBC (ref 0–0.2)
PLATELET # BLD AUTO: 126 K/UL (ref 164–446)
PLATELET # BLD AUTO: 132 K/UL (ref 164–446)
PMV BLD AUTO: 11.4 FL (ref 9–12.9)
PMV BLD AUTO: 11.5 FL (ref 9–12.9)
RBC # BLD AUTO: 2.11 M/UL (ref 4.2–5.4)
RBC # BLD AUTO: 2.41 M/UL (ref 4.2–5.4)
WBC # BLD AUTO: 8.9 K/UL (ref 4.8–10.8)
WBC # BLD AUTO: 9.3 K/UL (ref 4.8–10.8)

## 2023-06-04 PROCEDURE — 700102 HCHG RX REV CODE 250 W/ 637 OVERRIDE(OP): Performed by: OBSTETRICS & GYNECOLOGY

## 2023-06-04 PROCEDURE — 85027 COMPLETE CBC AUTOMATED: CPT

## 2023-06-04 PROCEDURE — 770002 HCHG ROOM/CARE - OB PRIVATE (112)

## 2023-06-04 PROCEDURE — A9270 NON-COVERED ITEM OR SERVICE: HCPCS | Performed by: ADVANCED PRACTICE MIDWIFE

## 2023-06-04 PROCEDURE — 36430 TRANSFUSION BLD/BLD COMPNT: CPT

## 2023-06-04 PROCEDURE — A9270 NON-COVERED ITEM OR SERVICE: HCPCS | Performed by: OBSTETRICS & GYNECOLOGY

## 2023-06-04 PROCEDURE — 700105 HCHG RX REV CODE 258: Performed by: ADVANCED PRACTICE MIDWIFE

## 2023-06-04 PROCEDURE — 36415 COLL VENOUS BLD VENIPUNCTURE: CPT

## 2023-06-04 PROCEDURE — 700102 HCHG RX REV CODE 250 W/ 637 OVERRIDE(OP): Performed by: ADVANCED PRACTICE MIDWIFE

## 2023-06-04 PROCEDURE — 700111 HCHG RX REV CODE 636 W/ 250 OVERRIDE (IP): Performed by: ADVANCED PRACTICE MIDWIFE

## 2023-06-04 PROCEDURE — 85025 COMPLETE CBC W/AUTO DIFF WBC: CPT

## 2023-06-04 PROCEDURE — P9016 RBC LEUKOCYTES REDUCED: HCPCS

## 2023-06-04 PROCEDURE — 86923 COMPATIBILITY TEST ELECTRIC: CPT

## 2023-06-04 RX ORDER — GABAPENTIN 300 MG/1
300 CAPSULE ORAL ONCE
Status: DISCONTINUED | OUTPATIENT
Start: 2023-06-04 | End: 2023-06-04

## 2023-06-04 RX ORDER — IBUPROFEN 800 MG/1
800 TABLET ORAL EVERY 8 HOURS
Status: DISCONTINUED | OUTPATIENT
Start: 2023-06-04 | End: 2023-06-06 | Stop reason: HOSPADM

## 2023-06-04 RX ORDER — GABAPENTIN 300 MG/1
600 CAPSULE ORAL
Status: DISCONTINUED | OUTPATIENT
Start: 2023-06-04 | End: 2023-06-06 | Stop reason: HOSPADM

## 2023-06-04 RX ORDER — ZIPRASIDONE HYDROCHLORIDE 80 MG/1
80 CAPSULE ORAL NIGHTLY
Status: DISCONTINUED | OUTPATIENT
Start: 2023-06-04 | End: 2023-06-04

## 2023-06-04 RX ORDER — GABAPENTIN 300 MG/1
600 CAPSULE ORAL EVERY EVENING
Status: DISCONTINUED | OUTPATIENT
Start: 2023-06-05 | End: 2023-06-04

## 2023-06-04 RX ORDER — GABAPENTIN 300 MG/1
300 CAPSULE ORAL NIGHTLY
Status: DISCONTINUED | OUTPATIENT
Start: 2023-06-04 | End: 2023-06-04

## 2023-06-04 RX ORDER — GABAPENTIN 300 MG/1
600 CAPSULE ORAL
Status: DISCONTINUED | OUTPATIENT
Start: 2023-06-04 | End: 2023-06-04

## 2023-06-04 RX ORDER — FERROUS SULFATE 325(65) MG
325 TABLET ORAL 2 TIMES DAILY WITH MEALS
Status: DISCONTINUED | OUTPATIENT
Start: 2023-06-04 | End: 2023-06-06 | Stop reason: HOSPADM

## 2023-06-04 RX ORDER — GABAPENTIN 300 MG/1
600 CAPSULE ORAL EVERY EVENING
Status: DISCONTINUED | OUTPATIENT
Start: 2023-06-04 | End: 2023-06-04

## 2023-06-04 RX ORDER — SODIUM CHLORIDE 9 MG/ML
INJECTION, SOLUTION INTRAVENOUS CONTINUOUS
Status: DISCONTINUED | OUTPATIENT
Start: 2023-06-04 | End: 2023-06-06 | Stop reason: HOSPADM

## 2023-06-04 RX ORDER — ZIPRASIDONE HYDROCHLORIDE 80 MG/1
80 CAPSULE ORAL NIGHTLY
Status: DISCONTINUED | OUTPATIENT
Start: 2023-06-04 | End: 2023-06-06 | Stop reason: HOSPADM

## 2023-06-04 RX ORDER — LEVOTHYROXINE SODIUM 0.12 MG/1
125 TABLET ORAL NIGHTLY
Status: DISCONTINUED | OUTPATIENT
Start: 2023-06-04 | End: 2023-06-06 | Stop reason: HOSPADM

## 2023-06-04 RX ORDER — GABAPENTIN 300 MG/1
300 CAPSULE ORAL ONCE
Status: COMPLETED | OUTPATIENT
Start: 2023-06-04 | End: 2023-06-04

## 2023-06-04 RX ORDER — LEVOTHYROXINE SODIUM 0.12 MG/1
125 TABLET ORAL NIGHTLY
Status: DISCONTINUED | OUTPATIENT
Start: 2023-06-04 | End: 2023-06-04

## 2023-06-04 RX ORDER — HYDROXYZINE HYDROCHLORIDE 25 MG/1
50 TABLET, FILM COATED ORAL ONCE
Status: COMPLETED | OUTPATIENT
Start: 2023-06-04 | End: 2023-06-04

## 2023-06-04 RX ORDER — KETOROLAC TROMETHAMINE 30 MG/ML
30 INJECTION, SOLUTION INTRAMUSCULAR; INTRAVENOUS ONCE
Status: COMPLETED | OUTPATIENT
Start: 2023-06-04 | End: 2023-06-04

## 2023-06-04 RX ADMIN — ACETAMINOPHEN 1000 MG: 500 TABLET, FILM COATED ORAL at 03:19

## 2023-06-04 RX ADMIN — ACETAMINOPHEN 1000 MG: 500 TABLET, FILM COATED ORAL at 20:52

## 2023-06-04 RX ADMIN — HYDROXYZINE HYDROCHLORIDE 50 MG: 25 TABLET, FILM COATED ORAL at 02:27

## 2023-06-04 RX ADMIN — IBUPROFEN 800 MG: 800 TABLET, FILM COATED ORAL at 17:12

## 2023-06-04 RX ADMIN — GABAPENTIN 300 MG: 300 CAPSULE ORAL at 02:27

## 2023-06-04 RX ADMIN — ZIPRASIDONE HYDROCHLORIDE 80 MG: 80 CAPSULE ORAL at 20:52

## 2023-06-04 RX ADMIN — OXYCODONE HYDROCHLORIDE 10 MG: 10 TABLET ORAL at 00:22

## 2023-06-04 RX ADMIN — DOCUSATE SODIUM 100 MG: 100 CAPSULE, LIQUID FILLED ORAL at 01:02

## 2023-06-04 RX ADMIN — SIMETHICONE 125 MG: 125 TABLET, CHEWABLE ORAL at 01:02

## 2023-06-04 RX ADMIN — GABAPENTIN 600 MG: 300 CAPSULE ORAL at 20:53

## 2023-06-04 RX ADMIN — LEVOTHYROXINE SODIUM 125 MCG: 0.12 TABLET ORAL at 20:52

## 2023-06-04 RX ADMIN — ACETAMINOPHEN 1000 MG: 500 TABLET, FILM COATED ORAL at 14:51

## 2023-06-04 RX ADMIN — KETOROLAC TROMETHAMINE 30 MG: 30 INJECTION, SOLUTION INTRAMUSCULAR; INTRAVENOUS at 02:26

## 2023-06-04 RX ADMIN — FERROUS SULFATE TAB 325 MG (65 MG ELEMENTAL FE) 325 MG: 325 (65 FE) TAB at 08:55

## 2023-06-04 RX ADMIN — SODIUM CHLORIDE: 9 INJECTION, SOLUTION INTRAVENOUS at 06:56

## 2023-06-04 RX ADMIN — FERROUS SULFATE TAB 325 MG (65 MG ELEMENTAL FE) 325 MG: 325 (65 FE) TAB at 17:12

## 2023-06-04 ASSESSMENT — PAIN DESCRIPTION - PAIN TYPE
TYPE: SURGICAL PAIN;ACUTE PAIN
TYPE: SURGICAL PAIN
TYPE: ACUTE PAIN;SURGICAL PAIN
TYPE: SURGICAL PAIN
TYPE: SURGICAL PAIN
TYPE: ACUTE PAIN;SURGICAL PAIN

## 2023-06-04 ASSESSMENT — EDINBURGH POSTNATAL DEPRESSION SCALE (EPDS)
I HAVE BEEN ANXIOUS OR WORRIED FOR NO GOOD REASON: HARDLY EVER
I HAVE BEEN SO UNHAPPY THAT I HAVE HAD DIFFICULTY SLEEPING: NOT AT ALL
I HAVE BEEN SO UNHAPPY THAT I HAVE BEEN CRYING: NO, NEVER
I HAVE FELT SAD OR MISERABLE: NO, NOT AT ALL
THE THOUGHT OF HARMING MYSELF HAS OCCURRED TO ME: NEVER
THINGS HAVE BEEN GETTING ON TOP OF ME: NO, I HAVE BEEN COPING AS WELL AS EVER
I HAVE LOOKED FORWARD WITH ENJOYMENT TO THINGS: AS MUCH AS I EVER DID
I HAVE BEEN ABLE TO LAUGH AND SEE THE FUNNY SIDE OF THINGS: AS MUCH AS I ALWAYS COULD
I HAVE BLAMED MYSELF UNNECESSARILY WHEN THINGS WENT WRONG: YES, SOME OF THE TIME
I HAVE FELT SCARED OR PANICKY FOR NO GOOD REASON: NO, NOT AT ALL

## 2023-06-04 NOTE — PROGRESS NOTES
1900: Report received from Madelyn SEGAL. POC serenae, assumed care of pt.  1925: Pt notifies RN that she did not received her dinner tray. Food tray rep contacted, will send pt dinner asap.  1930: Holt catheter removed. Pt states she would like to eat dinner first before attempting to get up.  2015: RN and CNA at bedside attempting to get pt out of bed, pt only able to seat at bedside, rates pain 7/10, see MAR.  2130: Pt denies any pain at the moment. Pt up at bedside and up to wheelchair with RN assist  2200: Pt taken to postpartum in stable condition. Report given to Lorena SEGAL. Care relinquished.

## 2023-06-04 NOTE — PROGRESS NOTES
Hemoglobin result came back 6.3, Courtney CNM notified, telephone order to transfuse 1 unit of PRBC.   Consent for blood transfusion signed.

## 2023-06-04 NOTE — PROGRESS NOTES
Obstetrics & Gynecology Post-Delivery Progress Note    Date of Service      38 y.o.  s/p  for repeat  Delivery date: 2023    Events  Postpartum hemorrhage, subsequent D & C, transfusion of one unit . Adopting infant to family member    Subjective  Pain: Yes,  location restless leg syndrome  Bleeding: lochia minimal  PO's: taking regular diet  Voiding: without difficulty  Ambulating: yes, with assist  Passing flatus: Yes  Feeding:  not breast or bottlefeeding    Objective  Temp:  [36.2 °C (97.1 °F)-37.4 °C (99.4 °F)] 36.2 °C (97.1 °F)  Pulse:  [71-98] 71  Resp:  [14-18] 18  BP: ()/(51-75) 95/57  SpO2:  [93 %-99 %] 98 %    Physical Exam  General: resting  Chest/Breasts: nipples intact   Abdomen: nontender, normal bowel sounds, non-distended  Fundus: firm and below umbilicus  Incision: dressing clean, dry, intact  Perineum: deferred  Extremities: symmetric, calves nontender    Recent Labs     23  2030 23  1437 23  0503   WBC 23.7* 12.1* 8.9   RBC 3.29* 2.36* 2.11*   HEMOGLOBIN 10.0* 7.2* 6.3*   HEMATOCRIT 30.1* 21.5* 19.3*   MCV 91.5 91.1 91.5   MCH 30.4 30.5 29.9   RDW 48.3 49.8 50.7*   PLATELETCT 144* 131* 126*   MPV 11.6 10.8 11.5   NEUTSPOLYS 88.40* 73.90* 74.20*   LYMPHOCYTES 4.50* 13.30* 14.80*   MONOCYTES 6.20 11.50 8.90   EOSINOPHILS 0.00 0.60 1.10   BASOPHILS 0.20 0.10 0.20       Assessment/Plan  Kristen Luna is a 38 y.o.yo  s/p postop day #2  s/p  for repeat    1. Post care: meeting all goals  2. Hemodynamics:  unstable related to significant blood loss . Patient consented to one unit blood at this time.   3. Pain: controlled outside of restless legs. Increasing gabapentin  4. Rh+, Rubella Immune  5. Method of Feeding:  breast support, no nipple stimulation  6. Method of Contraception:  not assessed  7. Disposition: likely home postop day 2    VTE prophylaxis: none indicated

## 2023-06-04 NOTE — PROGRESS NOTES
"0700-Report received from Kirit SEGAL and care assumed.   0750-Dr. Baig at bedside. 80 ml removed from Bakri balloon.  1220-Dr. Cerda at bedside. Bakri removed.   1500-Attempted to get pt out of bed but pt refused and stated she is in too much pain.  1800-Pt called out stating she is \"too hot\". Ice pack provided. Vital signs WNL.  1900-Report to Anne SEGAL        "

## 2023-06-04 NOTE — PROGRESS NOTES
"Pt called to use restroom, Harvey (CNA) and myself went in to ambulate her based on low H&H and pending blood transfusion, pt tolerated well and states she is \"feeling much better.\" Voided amount sufficient.  "

## 2023-06-04 NOTE — PROGRESS NOTES
Admitted from labor and delivery, post C/S day 1 in satisfactory condition. Came via wheelchair and placed to bed comfortably. IV line saline locked  into right and left hand. Assessment done, fundus firm at Umbilicus,lochia rubra minimal. Surgical dressing CDI. Denies pain at this time. Oriented to room and call light place within reach. Will continue to monitor. Plan of care on going.

## 2023-06-04 NOTE — PROGRESS NOTES
Assessment done.  Care plan reviewed and pt progressing according to caremap.  Will start blood transfusion as ordered once blood received.  Infant has already been discharged with family member as plan is to adopt out. Pt advised of emergency cords in her room .  Pt encouraged to wear supportive bra.  Pt using peribottle to perineum. Advised to call when needed to ambulate and go to restroom for assistance and pt verbalizes understanding and calls.  Denies need for pain meds at this time.  Pain currently a 0 on 0-10 pain scale.  Pt advised to call for any needs.

## 2023-06-04 NOTE — CARE PLAN
Problem: Altered Physiologic Condition  Goal: Patient physiologically stable as evidenced by normal lochia, palpable uterine involution and vitals within normal limits  Outcome: Progressing  FF@U, lochia light     Problem: Infection - Postpartum  Goal: Postpartum patient will be free of signs and symptoms of infection  Outcome: Progressing  Patient has no signs/symptoms of infection.     The patient is Watcher - Medium risk of patient condition declining or worsening    Shift Goals  Clinical Goals:  (rest, pain control, VSS)  Patient Goals: rest  Family Goals: N/A    Progress made toward(s) clinical / shift goals:  resting. Pt denies need for pain meds

## 2023-06-05 LAB
BASOPHILS # BLD AUTO: 0.3 % (ref 0–1.8)
BASOPHILS # BLD: 0.02 K/UL (ref 0–0.12)
EOSINOPHIL # BLD AUTO: 0.22 K/UL (ref 0–0.51)
EOSINOPHIL NFR BLD: 3 % (ref 0–6.9)
ERYTHROCYTE [DISTWIDTH] IN BLOOD BY AUTOMATED COUNT: 50.4 FL (ref 35.9–50)
ERYTHROCYTE [DISTWIDTH] IN BLOOD BY AUTOMATED COUNT: 52.8 FL (ref 35.9–50)
HCT VFR BLD AUTO: 19.5 % (ref 37–47)
HCT VFR BLD AUTO: 26.3 % (ref 37–47)
HGB BLD-MCNC: 6.4 G/DL (ref 12–16)
HGB BLD-MCNC: 8.6 G/DL (ref 12–16)
IMM GRANULOCYTES # BLD AUTO: 0.07 K/UL (ref 0–0.11)
IMM GRANULOCYTES NFR BLD AUTO: 1 % (ref 0–0.9)
LYMPHOCYTES # BLD AUTO: 1.98 K/UL (ref 1–4.8)
LYMPHOCYTES NFR BLD: 27.2 % (ref 22–41)
MCH RBC QN AUTO: 30 PG (ref 27–33)
MCH RBC QN AUTO: 30.2 PG (ref 27–33)
MCHC RBC AUTO-ENTMCNC: 32.7 G/DL (ref 32.2–35.5)
MCHC RBC AUTO-ENTMCNC: 32.8 G/DL (ref 32.2–35.5)
MCV RBC AUTO: 91.5 FL (ref 81.4–97.8)
MCV RBC AUTO: 92.3 FL (ref 81.4–97.8)
MONOCYTES # BLD AUTO: 0.71 K/UL (ref 0–0.85)
MONOCYTES NFR BLD AUTO: 9.8 % (ref 0–13.4)
NEUTROPHILS # BLD AUTO: 4.27 K/UL (ref 1.82–7.42)
NEUTROPHILS NFR BLD: 58.7 % (ref 44–72)
NRBC # BLD AUTO: 0 K/UL
NRBC BLD-RTO: 0 /100 WBC (ref 0–0.2)
PLATELET # BLD AUTO: 117 K/UL (ref 164–446)
PLATELET # BLD AUTO: 171 K/UL (ref 164–446)
PMV BLD AUTO: 10.5 FL (ref 9–12.9)
PMV BLD AUTO: 10.6 FL (ref 9–12.9)
RBC # BLD AUTO: 2.13 M/UL (ref 4.2–5.4)
RBC # BLD AUTO: 2.85 M/UL (ref 4.2–5.4)
WBC # BLD AUTO: 7.3 K/UL (ref 4.8–10.8)
WBC # BLD AUTO: 9.3 K/UL (ref 4.8–10.8)

## 2023-06-05 PROCEDURE — 770002 HCHG ROOM/CARE - OB PRIVATE (112)

## 2023-06-05 PROCEDURE — 700102 HCHG RX REV CODE 250 W/ 637 OVERRIDE(OP): Performed by: OBSTETRICS & GYNECOLOGY

## 2023-06-05 PROCEDURE — 86923 COMPATIBILITY TEST ELECTRIC: CPT

## 2023-06-05 PROCEDURE — 85027 COMPLETE CBC AUTOMATED: CPT

## 2023-06-05 PROCEDURE — A9270 NON-COVERED ITEM OR SERVICE: HCPCS | Performed by: OBSTETRICS & GYNECOLOGY

## 2023-06-05 PROCEDURE — 36430 TRANSFUSION BLD/BLD COMPNT: CPT

## 2023-06-05 PROCEDURE — 700102 HCHG RX REV CODE 250 W/ 637 OVERRIDE(OP): Performed by: ADVANCED PRACTICE MIDWIFE

## 2023-06-05 PROCEDURE — 36415 COLL VENOUS BLD VENIPUNCTURE: CPT

## 2023-06-05 PROCEDURE — A9270 NON-COVERED ITEM OR SERVICE: HCPCS

## 2023-06-05 PROCEDURE — A9270 NON-COVERED ITEM OR SERVICE: HCPCS | Performed by: ADVANCED PRACTICE MIDWIFE

## 2023-06-05 PROCEDURE — P9016 RBC LEUKOCYTES REDUCED: HCPCS

## 2023-06-05 PROCEDURE — 700111 HCHG RX REV CODE 636 W/ 250 OVERRIDE (IP): Performed by: OBSTETRICS & GYNECOLOGY

## 2023-06-05 PROCEDURE — 700102 HCHG RX REV CODE 250 W/ 637 OVERRIDE(OP)

## 2023-06-05 PROCEDURE — 85025 COMPLETE CBC W/AUTO DIFF WBC: CPT

## 2023-06-05 RX ORDER — ACETAMINOPHEN 500 MG
1000 TABLET ORAL EVERY 6 HOURS PRN
Status: DISCONTINUED | OUTPATIENT
Start: 2023-06-07 | End: 2023-06-06 | Stop reason: HOSPADM

## 2023-06-05 RX ORDER — ACETAMINOPHEN 500 MG
1000 TABLET ORAL EVERY 6 HOURS
Status: DISCONTINUED | OUTPATIENT
Start: 2023-06-05 | End: 2023-06-06 | Stop reason: HOSPADM

## 2023-06-05 RX ORDER — BUTALBITAL, ACETAMINOPHEN AND CAFFEINE 50; 325; 40 MG/1; MG/1; MG/1
1 TABLET ORAL EVERY 6 HOURS PRN
Status: DISCONTINUED | OUTPATIENT
Start: 2023-06-05 | End: 2023-06-06 | Stop reason: HOSPADM

## 2023-06-05 RX ADMIN — ACETAMINOPHEN 1000 MG: 500 TABLET, FILM COATED ORAL at 08:30

## 2023-06-05 RX ADMIN — ACETAMINOPHEN 1000 MG: 500 TABLET, FILM COATED ORAL at 21:03

## 2023-06-05 RX ADMIN — FERROUS SULFATE TAB 325 MG (65 MG ELEMENTAL FE) 325 MG: 325 (65 FE) TAB at 08:30

## 2023-06-05 RX ADMIN — IBUPROFEN 800 MG: 800 TABLET, FILM COATED ORAL at 08:30

## 2023-06-05 RX ADMIN — ONDANSETRON 4 MG: 2 INJECTION INTRAMUSCULAR; INTRAVENOUS at 19:18

## 2023-06-05 RX ADMIN — BUTALBITAL, ACETAMINOPHEN AND CAFFEINE 1 TABLET: 325; 50; 40 TABLET ORAL at 14:58

## 2023-06-05 RX ADMIN — ACETAMINOPHEN 1000 MG: 500 TABLET, FILM COATED ORAL at 02:06

## 2023-06-05 RX ADMIN — DOCUSATE SODIUM 100 MG: 100 CAPSULE, LIQUID FILLED ORAL at 08:30

## 2023-06-05 RX ADMIN — IBUPROFEN 800 MG: 800 TABLET, FILM COATED ORAL at 17:46

## 2023-06-05 RX ADMIN — ZIPRASIDONE HYDROCHLORIDE 80 MG: 80 CAPSULE ORAL at 21:03

## 2023-06-05 RX ADMIN — GABAPENTIN 600 MG: 300 CAPSULE ORAL at 21:03

## 2023-06-05 RX ADMIN — OXYCODONE 5 MG: 5 TABLET ORAL at 21:03

## 2023-06-05 RX ADMIN — LEVOTHYROXINE SODIUM 125 MCG: 0.12 TABLET ORAL at 21:03

## 2023-06-05 RX ADMIN — IBUPROFEN 800 MG: 800 TABLET, FILM COATED ORAL at 00:08

## 2023-06-05 RX ADMIN — FERROUS SULFATE TAB 325 MG (65 MG ELEMENTAL FE) 325 MG: 325 (65 FE) TAB at 17:46

## 2023-06-05 ASSESSMENT — PAIN DESCRIPTION - PAIN TYPE
TYPE: SURGICAL PAIN
TYPE: ACUTE PAIN
TYPE: ACUTE PAIN;SURGICAL PAIN
TYPE: SURGICAL PAIN
TYPE: SURGICAL PAIN
TYPE: ACUTE PAIN
TYPE: ACUTE PAIN;SURGICAL PAIN
TYPE: ACUTE PAIN
TYPE: ACUTE PAIN;SURGICAL PAIN
TYPE: SURGICAL PAIN

## 2023-06-05 ASSESSMENT — PATIENT HEALTH QUESTIONNAIRE - PHQ9
2. FEELING DOWN, DEPRESSED, IRRITABLE, OR HOPELESS: NOT AT ALL
1. LITTLE INTEREST OR PLEASURE IN DOING THINGS: NOT AT ALL
SUM OF ALL RESPONSES TO PHQ9 QUESTIONS 1 AND 2: 0

## 2023-06-05 NOTE — PROGRESS NOTES
0534: Updated Elena Rosales on pt's CBC with differential results and pt's status. Reported that pt was dizzy at the beginning of shift, but no complaints of dizziness later in the shift. She has been very sleepy. Lochia scant to light. Encouraged her to drink fluids. VS have been normal throughout the shift. No new orders at this time.

## 2023-06-05 NOTE — PROGRESS NOTES
Assessment done. Vital signs stable. Patient voiding without difficulty, claims to be passing flatus. Fundus firm 1 below umbilicus with scant lochia. Mepilex silver over low abdominal incision clean, dry, and intact. No redness, swelling, or drainage noted. Patient ambulating with steady gait. Patient claims to have good pain relief with p.o meds. Patient encouraged to call for any needs. Will continue to monitor.

## 2023-06-05 NOTE — PROGRESS NOTES
Post Partum Progress Note    Name:   Kristen Luna   Date/Time:  2023 - 7:56 AM  Chief Admitting Dx:  Labor and delivery indication for care or intervention [O75.9]  Delivery Type:   for repeat  Post-Op/Post Partum Days #:  3    Subjective:  Abdominal pain: Yes, mild, well controlled with PO medications  Ambulating:   Yes, feeling mild dizziness when ambulating initially  Tolerating liquids:  yes  Tolerating food:  yes common adult  Flatus:   yes  BM:    no  Bleeding:   with a small amount of bleeding  Voiding:   yes  Dizziness:   Yes, mild when she gets up to ambulate/void  Feeding:   Infant up for adoption  Headache, complains of back of head pain, c/w previous headaches outside of pregnancy    Vitals:    23 1757 23 2200 23 0147 23 0500   BP: 114/72 122/77 111/55 109/69   Pulse: 96 79 77 75   Resp: 16 17 17 17   Temp: 37.2 °C (99 °F) 37.1 °C (98.7 °F) 37.1 °C (98.8 °F) 37.4 °C (99.3 °F)   TempSrc: Temporal Temporal Temporal Temporal   SpO2: 96% 96% 95% 98%   Weight:       Height:           Exam:  Breast: Tenderness no, Engorged no, and Lactating no  Abdomen: Abdomen soft, non-tender. BS normal. No masses,  No organomegaly  Fundal Tenderness:  no  Fundus Firm: yes  Incision: dry and intact, dressing in place (mepilex)  Below umbilicus: yes  Perineum: perineum intact  Lochia: mild  Extremities: Normal extremities, peripheral pulses and reflexes normal    Meds:  Current Facility-Administered Medications   Medication Dose    butalbital/apap/caffeine (Fioricet) -40 mg per tablet 1 Tablet  1 Tablet    levothyroxine (SYNTHROID) tablet 125 mcg  125 mcg    ziprasidone (GEODON) capsule 80 mg  80 mg    ibuprofen (MOTRIN) tablet 800 mg  800 mg    gabapentin (NEURONTIN) capsule 600 mg  600 mg    NS infusion      ferrous sulfate tablet 325 mg  325 mg    lactated ringers infusion      simethicone (Mylicon) chewable tablet 125 mg  125 mg    prenatal plus vitamin (STUARTNATAL 1+1)  27-1 MG tablet 1 Tablet  1 Tablet    calcium carbonate (Tums) chewable tab 1,000 mg  1,000 mg    loperamide (IMODIUM) capsule 2 mg  2 mg    diphenhydrAMINE (BENADRYL) injection 50 mg  50 mg    acetaminophen (TYLENOL) tablet 1,000 mg  1,000 mg    Followed by    [START ON 2023] acetaminophen (TYLENOL) tablet 1,000 mg  1,000 mg    docusate sodium (COLACE) capsule 100 mg  100 mg    ondansetron (ZOFRAN) syringe/vial injection 4 mg  4 mg    Or    ondansetron (ZOFRAN ODT) dispertab 4 mg  4 mg    oxyCODONE immediate release (ROXICODONE) tablet 10 mg  10 mg    oxyCODONE immediate-release (ROXICODONE) tablet 5 mg  5 mg    diphenhydrAMINE (BENADRYL) tablet/capsule 25 mg  25 mg    oxytocin (Pitocin) infusion (for post delivery)  125 mL/hr    oxytocin (Pitocin) 0.02 Units/mL LR (postpartum)   mL/hr       Labs:   Recent Labs     23  0503 23  1535 23  0507   WBC 8.9 9.3 7.3   RBC 2.11* 2.41* 2.13*   HEMOGLOBIN 6.3* 7.3* 6.4*   HEMATOCRIT 19.3* 21.8* 19.5*   MCV 91.5 90.5 91.5   MCH 29.9 30.3 30.0   MCHC 32.6 33.5 32.8   RDW 50.7* 49.3 50.4*   PLATELETCT 126* 132* 117*   MPV 11.5 11.4 10.6       Assessment:  Chief Admitting Dx:  Labor and delivery indication for care or intervention [O75.9]  Delivery Type:   for repeat  Tubal Ligation:  No, but now desires permanent sterilization    Plan:  Continue routine post partum care.  Discussed her CBC this am with symptoms of dizziness with Dr. Jensen, plan to transfuse 1 additional unit of PRBCs and repeat CBC this afternoon.   Fioricet x1 for headache  Reevaluate this afternoon post transfusion    Thalia Mchugh C.N.M.  Attending: Dr. Jensen

## 2023-06-05 NOTE — PROGRESS NOTES
MD notified of Fioricet being held due to patient possibly exceeding tylenol dosage of 4,000mg daily.

## 2023-06-05 NOTE — CARE PLAN
The patient is Stable - Low risk of patient condition declining or worsening    Shift Goals  Clinical Goals: VS WDL  Patient Goals: Rest  Family Goals: N/A    Progress made toward(s) clinical / shift goals:    Problem: Knowledge Deficit - Postpartum  Goal: Patient will verbalize and demonstrate understanding of self and infant care  Outcome: Progressing     Problem: Psychosocial - Postpartum  Goal: Patient will verbalize and demonstrate effective bonding and parenting behavior  Outcome: Progressing     Problem: Altered Physiologic Condition  Goal: Patient physiologically stable as evidenced by normal lochia, palpable uterine involution and vitals within normal limits  Outcome: Progressing     Problem: Infection - Postpartum  Goal: Postpartum patient will be free of signs and symptoms of infection  Outcome: Progressing     Problem: Respiratory/Oxygenation Function Post-Surgical  Goal: Patient will achieve/maintain normal respiratory rate/effort  Outcome: Progressing     Problem: Bowel Elimination - Post Surgical  Goal: Patient will resume regular bowel sounds and function with no discomfort or distention  Outcome: Progressing     Problem: Early Mobilization - Post Surgery  Goal: Early mobilization post surgery  Outcome: Progressing       Patient is not progressing towards the following goals:

## 2023-06-05 NOTE — PROGRESS NOTES
Dr. Cedeno notified of CBC results from 1535. Pt denies any dizziness, etc.  States she is feeling much better, lochia scant-light, no new orders received. CBC tomorrow am already ordered as well as Ferrous Sulfate.

## 2023-06-05 NOTE — CARE PLAN
The patient is Stable - Low risk of patient condition declining or worsening    Shift Goals  Clinical Goals: VS WDL; rest  Patient Goals: rest  Family Goals: N/A    Progress made toward(s) clinical / shift goals:  VS WDL throughout the shift, no complaints of dizziness. Pt slept with minimal interruptions.     Patient is not progressing towards the following goals:

## 2023-06-05 NOTE — PROGRESS NOTES
1910: Received bedside report from day shift RN, Tabatha SANCHES Greeted pt at the bedside. Whiteboard updated. Pt expressed the desire to walk to the restroom to void with assistance. As pt walked to the restroom, she started to feel dizzy while walking back to her bed.     2100: Completed assessment. Pt denies of dizziness at this time. Pt complains of pain at this time, given pain medication (see MAR). Education was done on the medications administered. IV saline locked no signs of phlebitis or infiltration. POC discussed: pain control, rest, future lab tests, hydration, and ambulation. Reinforced education on emergency and non-emergency call light system. Pt verbalized understanding. Call light placed within reach.

## 2023-06-06 ENCOUNTER — PHARMACY VISIT (OUTPATIENT)
Dept: PHARMACY | Facility: MEDICAL CENTER | Age: 38
End: 2023-06-06
Payer: MEDICARE

## 2023-06-06 VITALS
OXYGEN SATURATION: 97 % | BODY MASS INDEX: 26.54 KG/M2 | HEART RATE: 61 BPM | RESPIRATION RATE: 17 BRPM | TEMPERATURE: 98.5 F | HEIGHT: 63 IN | SYSTOLIC BLOOD PRESSURE: 114 MMHG | DIASTOLIC BLOOD PRESSURE: 71 MMHG | WEIGHT: 149.8 LBS

## 2023-06-06 PROBLEM — Z3A.39 39 WEEKS GESTATION OF PREGNANCY: Status: ACTIVE | Noted: 2023-06-06

## 2023-06-06 LAB
ERYTHROCYTE [DISTWIDTH] IN BLOOD BY AUTOMATED COUNT: 52.8 FL (ref 35.9–50)
HCT VFR BLD AUTO: 23.8 % (ref 37–47)
HGB BLD-MCNC: 7.7 G/DL (ref 12–16)
MCH RBC QN AUTO: 29.5 PG (ref 27–33)
MCHC RBC AUTO-ENTMCNC: 32.4 G/DL (ref 32.2–35.5)
MCV RBC AUTO: 91.2 FL (ref 81.4–97.8)
PLATELET # BLD AUTO: 132 K/UL (ref 164–446)
PMV BLD AUTO: 10.2 FL (ref 9–12.9)
RBC # BLD AUTO: 2.61 M/UL (ref 4.2–5.4)
WBC # BLD AUTO: 8.7 K/UL (ref 4.8–10.8)

## 2023-06-06 PROCEDURE — A9270 NON-COVERED ITEM OR SERVICE: HCPCS | Performed by: OBSTETRICS & GYNECOLOGY

## 2023-06-06 PROCEDURE — 36415 COLL VENOUS BLD VENIPUNCTURE: CPT

## 2023-06-06 PROCEDURE — 700102 HCHG RX REV CODE 250 W/ 637 OVERRIDE(OP): Performed by: OBSTETRICS & GYNECOLOGY

## 2023-06-06 PROCEDURE — 700102 HCHG RX REV CODE 250 W/ 637 OVERRIDE(OP): Performed by: ADVANCED PRACTICE MIDWIFE

## 2023-06-06 PROCEDURE — A9270 NON-COVERED ITEM OR SERVICE: HCPCS | Performed by: ADVANCED PRACTICE MIDWIFE

## 2023-06-06 PROCEDURE — 85027 COMPLETE CBC AUTOMATED: CPT

## 2023-06-06 PROCEDURE — RXMED WILLOW AMBULATORY MEDICATION CHARGE: Performed by: NURSE PRACTITIONER

## 2023-06-06 RX ORDER — FERROUS SULFATE 325(65) MG
325 TABLET ORAL
Qty: 90 TABLET | Refills: 3 | Status: SHIPPED | OUTPATIENT
Start: 2023-06-06 | End: 2023-07-18

## 2023-06-06 RX ORDER — OXYCODONE HYDROCHLORIDE 5 MG/1
5 TABLET ORAL EVERY 4 HOURS PRN
Qty: 20 TABLET | Refills: 0 | Status: SHIPPED | OUTPATIENT
Start: 2023-06-06 | End: 2023-06-13

## 2023-06-06 RX ORDER — ACETAMINOPHEN 500 MG
1000 TABLET ORAL EVERY 6 HOURS PRN
Qty: 60 TABLET | Refills: 1 | Status: SHIPPED | OUTPATIENT
Start: 2023-06-06 | End: 2023-07-11

## 2023-06-06 RX ORDER — IBUPROFEN 800 MG/1
800 TABLET ORAL EVERY 8 HOURS
Qty: 60 TABLET | Refills: 1 | Status: SHIPPED | OUTPATIENT
Start: 2023-06-06 | End: 2023-07-11

## 2023-06-06 RX ORDER — PSEUDOEPHEDRINE HCL 30 MG
100 TABLET ORAL 2 TIMES DAILY PRN
Qty: 60 CAPSULE | Refills: 1 | Status: SHIPPED | OUTPATIENT
Start: 2023-06-06 | End: 2023-07-11

## 2023-06-06 RX ADMIN — IBUPROFEN 800 MG: 800 TABLET, FILM COATED ORAL at 08:36

## 2023-06-06 RX ADMIN — ACETAMINOPHEN 1000 MG: 500 TABLET, FILM COATED ORAL at 08:36

## 2023-06-06 RX ADMIN — ACETAMINOPHEN 1000 MG: 500 TABLET, FILM COATED ORAL at 03:02

## 2023-06-06 RX ADMIN — OXYCODONE 5 MG: 5 TABLET ORAL at 02:09

## 2023-06-06 RX ADMIN — IBUPROFEN 800 MG: 800 TABLET, FILM COATED ORAL at 00:59

## 2023-06-06 ASSESSMENT — PAIN DESCRIPTION - PAIN TYPE
TYPE: ACUTE PAIN
TYPE: ACUTE PAIN;SURGICAL PAIN
TYPE: ACUTE PAIN

## 2023-06-06 NOTE — PROGRESS NOTES
0840-Assessment done. Vital signs stable. Patient voiding without difficulty, claims to be passing flatus. Fundus firm 1 below the umbilicus with scant lochia. Mepilex silver over low abdominal incision clean, dry, and intact. No redness, swelling, or drainage noted. Patient ambulating with steady gait. Patient claims to have good pain relief with p.o meds. Patient encouraged to call for any needs. Will continue to monitor.     0900-Post partum teaching complete. Patient discharged home in stable condition. Patient claims all questions have been answered. Denies problems. Follow up instructions given.

## 2023-06-06 NOTE — PROGRESS NOTES
Reviewed CBC result, patient desires to stay until tomorrow for additional support and reassurance. No longer dizzy or symptomatic since blood transfusion. Repeat CBC in AM. Anticipate D/C tomorrow AM.      Latest Reference Range & Units 06/02/23 20:30 06/03/23 14:37 06/04/23 05:03 06/04/23 15:35 06/05/23 05:07 06/05/23 16:06   Hemoglobin 12.0 - 16.0 g/dL 10.0 (L) 7.2 (L) 6.3 (L) 7.3 (L) 6.4 (L) 8.6 (L)   Hematocrit 37.0 - 47.0 % 30.1 (L) 21.5 (L) 19.3 (L) 21.8 (L) 19.5 (L) 26.3 (L)   (L): Data is abnormally low        Thalia Mchugh C.N.M.

## 2023-06-06 NOTE — DISCHARGE SUMMARY
Discharge Summary:      Kristen Luna    Admit Date:   2023  Discharge Date:  2023     Admitting diagnosis:  Labor and delivery indication for care or intervention [O75.9]  Discharge Diagnosis: Status post  for repeat.  Pregnancy Complications: AMA, MDD, hypothyroidism, EDS  Tubal Ligation:  no        History:  Past Medical History:   Diagnosis Date    Arrhythmia     SVT no episodes since     Bipolar affective disorder (HCC)     Breath shortness     at noc [id number]; primary MD aware    Complete placenta previa nos or without hemorrhage, second trimester 2023    Complete previa on 20 week US with [facility]- has repeat scheduled    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    Depression     Gastroparesis     Heart burn     Hypothyroid     Pain     Left knee 5/10    Pregnant     PTSD (post-traumatic stress disorder)      OB History    Para Term  AB Living   2 2 2     2   SAB IAB Ectopic Molar Multiple Live Births           0 2      # Outcome Date GA Lbr Ernesto/2nd Weight Sex Delivery Anes PTL Lv   2 Term 23 39w1d  3.6 kg (7 lb 15 oz) M CS-LTranv Spinal N DAREK   1 Term 20 40w1d  3.81 kg (8 lb 6.4 oz) F CS-LTranv Spinal N DAREK      Complications: Failure to Progress in First Stage        Reglan [metoclopramide], Codeine, Fentanyl, Hydrocodone, and Zoloft  Patient Active Problem List    Diagnosis Date Noted    Labor and delivery indication for care or intervention 2023    High-risk pregnancy supervision, third trimester 2022    Previous  section 2022    Multigravida of advanced maternal age in third trimester 2022    27 weeks gestation of pregnancy 2022    MDD (major depressive disorder), recurrent episode, mild (HCC) 2022    Intermittent explosive disorder 2022    Galactorrhea 2022    Breast pain 2022    Abdominal pain 2021    Sandra-Danlos syndrome Type III 2020    Gastroparesis  2018    Hypothyroidism 2018        Hospital Course:   38 y.o. , now para 2, was admitted with the above mentioned diagnosis, underwent Repeat  repeat,  for repeat. Patient postpartum course was unremarkable, with progressive advancement in diet , ambulation and toleration of oral analgesia. Patient without complaints today and desires discharge.  She reports her dizziness is improving and she is eager to get home to her daughter due to not having continued childcare for her.     Vitals:    23 1825 23 2030 23 0200 23 0600   BP: (!) 151/97 124/75 119/74 114/71   Pulse: 83 73 77 61   Resp: 18 18 18 17   Temp: 36.7 °C (98.1 °F) 36.8 °C (98.3 °F) 36.4 °C (97.6 °F) 36.9 °C (98.5 °F)   TempSrc: Temporal Temporal Temporal Temporal   SpO2: 98% 99% 97% 97%   Weight:       Height:           Current Facility-Administered Medications   Medication Dose    butalbital/apap/caffeine (Fioricet) -40 mg per tablet 1 Tablet  1 Tablet    acetaminophen (TYLENOL) tablet 1,000 mg  1,000 mg    Followed by    [START ON 2023] acetaminophen (TYLENOL) tablet 1,000 mg  1,000 mg    levothyroxine (SYNTHROID) tablet 125 mcg  125 mcg    ziprasidone (GEODON) capsule 80 mg  80 mg    ibuprofen (MOTRIN) tablet 800 mg  800 mg    gabapentin (NEURONTIN) capsule 600 mg  600 mg    NS infusion      ferrous sulfate tablet 325 mg  325 mg    lactated ringers infusion      simethicone (Mylicon) chewable tablet 125 mg  125 mg    prenatal plus vitamin (STUARTNATAL 1+1) 27-1 MG tablet 1 Tablet  1 Tablet    calcium carbonate (Tums) chewable tab 1,000 mg  1,000 mg    loperamide (IMODIUM) capsule 2 mg  2 mg    diphenhydrAMINE (BENADRYL) injection 50 mg  50 mg    docusate sodium (COLACE) capsule 100 mg  100 mg    ondansetron (ZOFRAN) syringe/vial injection 4 mg  4 mg    Or    ondansetron (ZOFRAN ODT) dispertab 4 mg  4 mg    oxyCODONE immediate release (ROXICODONE) tablet 10 mg  10 mg    oxyCODONE  immediate-release (ROXICODONE) tablet 5 mg  5 mg    diphenhydrAMINE (BENADRYL) tablet/capsule 25 mg  25 mg    oxytocin (Pitocin) infusion (for post delivery)  125 mL/hr    oxytocin (Pitocin) 0.02 Units/mL LR (postpartum)   mL/hr       Exam:  Breast Exam: negative  Abdomen: Abdomen soft, non-tender. BS normal. No masses,  No organomegaly  Fundus Non Tender: yes  Incision: dry and intact  Perineum: perineum intact  Extremity: extremities, peripheral pulses and reflexes normal, no edema, redness or tenderness in the calves or thighs     Labs:  Recent Labs     06/05/23  0507 06/05/23  1606 06/06/23  0511   WBC 7.3 9.3 8.7   RBC 2.13* 2.85* 2.61*   HEMOGLOBIN 6.4* 8.6* 7.7*   HEMATOCRIT 19.5* 26.3* 23.8*   MCV 91.5 92.3 91.2   MCH 30.0 30.2 29.5   MCHC 32.8 32.7 32.4   RDW 50.4* 52.8* 52.8*   PLATELETCT 117* 171 132*   MPV 10.6 10.5 10.2        Activity:   Discharge to home  Pelvic Rest x 6 weeks    Assessment:  Normal postpartum course with transfusion of 3 units  Discharge Assessment: No areas of skin breakdown/redness; surgical incision intact/healing     Follow up: .[facility] in 5 weeks for vaginal ; 1 week for incision check.   Ferrous sulfate ordered for TID  Reviewed monitoring for worsening anemia symptoms      Discharge Meds:   No current outpatient medications on file.       MISSY Escudero

## 2023-06-06 NOTE — PROGRESS NOTES
1900: Report received from Yeimi SEGAL, POC discussed.     1915: Initial assessment completed. Pt is restless in the bed and is c/o increased N/V since she took her iron pill without food. Zofran given per RN. Pt is also states she is having incisional pain of 8/10, but denies wanting further PRN medications at this time. RN discussed pain management plan and pt agrees to call RN if she changes her mind. Incision, fundal and vaginal bleeding WNL. POC for the night discussed and pt is agreeable with plan. Pt educated on call light use and to call RN or CNA when she needs to get out of bed since she had dizziness with standing throughout the day. Pt verbalizes understanding. All questions and needs met at this time.     0600: Pt called RN with concerns about her lab results, RN will notify CNM of pts concerns and discus any changes in POC.     0602: Selam Silver CNM notified of lab results and pts concerns. No new orders received at this time.     0700: Report given to Yeimi SEGAL, pt stable at time of handoff. POC discussed and agreed upon per RN's.

## 2023-06-06 NOTE — DISCHARGE INSTRUCTIONS

## 2023-06-06 NOTE — CARE PLAN
The patient is Watcher - Medium risk of patient condition declining or worsening    Shift Goals  Clinical Goals: v/s WDL, monitor H&H  Patient Goals: rest and pain control  Family Goals: N/A

## 2023-06-07 ENCOUNTER — APPOINTMENT (OUTPATIENT)
Dept: RADIOLOGY | Facility: MEDICAL CENTER | Age: 38
End: 2023-06-07
Attending: EMERGENCY MEDICINE
Payer: MEDICARE

## 2023-06-07 ENCOUNTER — HOSPITAL ENCOUNTER (EMERGENCY)
Facility: MEDICAL CENTER | Age: 38
End: 2023-06-07
Attending: EMERGENCY MEDICINE
Payer: MEDICARE

## 2023-06-07 VITALS
TEMPERATURE: 99.2 F | DIASTOLIC BLOOD PRESSURE: 63 MMHG | SYSTOLIC BLOOD PRESSURE: 128 MMHG | HEIGHT: 63 IN | OXYGEN SATURATION: 97 % | WEIGHT: 139.77 LBS | HEART RATE: 73 BPM | RESPIRATION RATE: 16 BRPM | BODY MASS INDEX: 24.77 KG/M2

## 2023-06-07 DIAGNOSIS — R60.9 PERIPHERAL EDEMA: ICD-10-CM

## 2023-06-07 DIAGNOSIS — M79.89 LEG SWELLING: ICD-10-CM

## 2023-06-07 LAB
ALBUMIN SERPL BCP-MCNC: 3.3 G/DL (ref 3.2–4.9)
ALBUMIN/GLOB SERPL: 1.1 G/DL
ALP SERPL-CCNC: 145 U/L (ref 30–99)
ALT SERPL-CCNC: 15 U/L (ref 2–50)
ANION GAP SERPL CALC-SCNC: 14 MMOL/L (ref 7–16)
APPEARANCE UR: CLEAR
AST SERPL-CCNC: 11 U/L (ref 12–45)
BACTERIA #/AREA URNS HPF: NEGATIVE /HPF
BASOPHILS # BLD AUTO: 0.3 % (ref 0–1.8)
BASOPHILS # BLD: 0.03 K/UL (ref 0–0.12)
BILIRUB SERPL-MCNC: 0.3 MG/DL (ref 0.1–1.5)
BILIRUB UR QL STRIP.AUTO: NEGATIVE
BUN SERPL-MCNC: 11 MG/DL (ref 8–22)
CALCIUM ALBUM COR SERPL-MCNC: 9.1 MG/DL (ref 8.5–10.5)
CALCIUM SERPL-MCNC: 8.5 MG/DL (ref 8.5–10.5)
CHLORIDE SERPL-SCNC: 106 MMOL/L (ref 96–112)
CO2 SERPL-SCNC: 23 MMOL/L (ref 20–33)
COLOR UR: YELLOW
CREAT SERPL-MCNC: 0.54 MG/DL (ref 0.5–1.4)
EOSINOPHIL # BLD AUTO: 0.15 K/UL (ref 0–0.51)
EOSINOPHIL NFR BLD: 1.7 % (ref 0–6.9)
EPI CELLS #/AREA URNS HPF: NEGATIVE /HPF
ERYTHROCYTE [DISTWIDTH] IN BLOOD BY AUTOMATED COUNT: 51.8 FL (ref 35.9–50)
GFR SERPLBLD CREATININE-BSD FMLA CKD-EPI: 121 ML/MIN/1.73 M 2
GLOBULIN SER CALC-MCNC: 2.9 G/DL (ref 1.9–3.5)
GLUCOSE SERPL-MCNC: 88 MG/DL (ref 65–99)
GLUCOSE UR STRIP.AUTO-MCNC: NEGATIVE MG/DL
HCG SERPL QL: POSITIVE
HCT VFR BLD AUTO: 31.9 % (ref 37–47)
HGB BLD-MCNC: 10.4 G/DL (ref 12–16)
HYALINE CASTS #/AREA URNS LPF: NORMAL /LPF
IMM GRANULOCYTES # BLD AUTO: 0.09 K/UL (ref 0–0.11)
IMM GRANULOCYTES NFR BLD AUTO: 1 % (ref 0–0.9)
KETONES UR STRIP.AUTO-MCNC: NEGATIVE MG/DL
LEUKOCYTE ESTERASE UR QL STRIP.AUTO: NEGATIVE
LIPASE SERPL-CCNC: 20 U/L (ref 11–82)
LYMPHOCYTES # BLD AUTO: 1.2 K/UL (ref 1–4.8)
LYMPHOCYTES NFR BLD: 13.9 % (ref 22–41)
MCH RBC QN AUTO: 29.7 PG (ref 27–33)
MCHC RBC AUTO-ENTMCNC: 32.6 G/DL (ref 32.2–35.5)
MCV RBC AUTO: 91.1 FL (ref 81.4–97.8)
MICRO URNS: ABNORMAL
MONOCYTES # BLD AUTO: 0.72 K/UL (ref 0–0.85)
MONOCYTES NFR BLD AUTO: 8.3 % (ref 0–13.4)
NEUTROPHILS # BLD AUTO: 6.46 K/UL (ref 1.82–7.42)
NEUTROPHILS NFR BLD: 74.8 % (ref 44–72)
NITRITE UR QL STRIP.AUTO: NEGATIVE
NRBC # BLD AUTO: 0 K/UL
NRBC BLD-RTO: 0 /100 WBC (ref 0–0.2)
PH UR STRIP.AUTO: 7.5 [PH] (ref 5–8)
PLATELET # BLD AUTO: 199 K/UL (ref 164–446)
PMV BLD AUTO: 10.7 FL (ref 9–12.9)
POTASSIUM SERPL-SCNC: 3.8 MMOL/L (ref 3.6–5.5)
PROT SERPL-MCNC: 6.2 G/DL (ref 6–8.2)
PROT UR QL STRIP: NEGATIVE MG/DL
RBC # BLD AUTO: 3.5 M/UL (ref 4.2–5.4)
RBC # URNS HPF: NORMAL /HPF
RBC UR QL AUTO: ABNORMAL
SODIUM SERPL-SCNC: 143 MMOL/L (ref 135–145)
SP GR UR STRIP.AUTO: 1
UROBILINOGEN UR STRIP.AUTO-MCNC: 0.2 MG/DL
WBC # BLD AUTO: 8.7 K/UL (ref 4.8–10.8)
WBC #/AREA URNS HPF: NORMAL /HPF

## 2023-06-07 PROCEDURE — 83690 ASSAY OF LIPASE: CPT

## 2023-06-07 PROCEDURE — 85025 COMPLETE CBC W/AUTO DIFF WBC: CPT

## 2023-06-07 PROCEDURE — 80053 COMPREHEN METABOLIC PANEL: CPT

## 2023-06-07 PROCEDURE — 84703 CHORIONIC GONADOTROPIN ASSAY: CPT

## 2023-06-07 PROCEDURE — 99284 EMERGENCY DEPT VISIT MOD MDM: CPT

## 2023-06-07 PROCEDURE — 93971 EXTREMITY STUDY: CPT | Mod: 26,RT | Performed by: INTERNAL MEDICINE

## 2023-06-07 PROCEDURE — 81001 URINALYSIS AUTO W/SCOPE: CPT

## 2023-06-07 PROCEDURE — 93971 EXTREMITY STUDY: CPT | Mod: RT

## 2023-06-07 PROCEDURE — 36415 COLL VENOUS BLD VENIPUNCTURE: CPT

## 2023-06-07 ASSESSMENT — FIBROSIS 4 INDEX: FIB4 SCORE: 2

## 2023-06-07 NOTE — ED TRIAGE NOTES
L&d Charge notified of This pt is s/p  c/o low h&h w/minimal bleeding and right leg swelling since last night. Per Rodrigo charge this pt can be seen here in ED and have them call her OB-gyn

## 2023-06-07 NOTE — ED TRIAGE NOTES
Chief Complaint   Patient presents with    Leg Swelling     In right leg since last night.     Vaginal Bleeding     Since last night. States soaked 1 pad last night, but states she only has minimal bleeding this am. S/p /post partum 5 days.     Vitals:    23 1052   BP: 123/74   Pulse: 71   Resp: 18   Temp: 36.8 °C (98.2 °F)   SpO2: 100%

## 2023-06-07 NOTE — DISCHARGE INSTRUCTIONS
Your tests here were all reassuring.  There is no sign of infection, and significant anemia, or clot in your leg.  Anemia is often associated with swelling in the extremities, and you can also have this after pregnancy.  This should resolve gradually over time as you recover from your delivery.  Please schedule a follow-up visit with your OB/GYN, as well as a 1week pediatrics visit for your .

## 2023-06-07 NOTE — ED NOTES
Discharge instructions reviewed with patient. Patient verbalizes understanding of follow up care, and reasons to return to ER. Clothing provided to patient. Patient ambulates to lobby with steady gait.

## 2023-06-07 NOTE — ED NOTES
Patient sitting in bed alert and oriented. Mother and daughter by side. No needs at this time. Call light within reach.

## 2023-06-07 NOTE — ED PROVIDER NOTES
ED Provider Note    CHIEF COMPLAINT  Chief Complaint   Patient presents with    Leg Swelling     In right leg since last night.     Vaginal Bleeding     Since last night. States soaked 1 pad last night, but states she only has minimal bleeding this am. S/p /post partum 5 days.       EXTERNAL RECORDS REVIEWED  Inpatient records show that this patient was discharged yesterday after a 5-day stay in the hospital after , complicated by hemorrhage, 3 units of PRBC transfusion, return trip to the operating room for D&C.    HPI/ROS  LIMITATION TO HISTORY     OUTSIDE HISTORIAN(S):  Patient's mother at the bedside contributes to history.    Kristen Luna is a 38 y.o. female who presents to the emergency department out of concern for a brief increase in vaginal bleeding last night.  As above, she had a recent , complicated by hemorrhage, transfusion, and return to the operating room for D&C.  She said that she soaked through 1 pad in 3 hours last night, but bleeding has since slowed.  There is no associated pain.  She has not had fevers or chills, nausea or vomiting, chest pain or cough or shortness of breath.  She is already seen her results from labs ordered in triage, and hemoglobin has increased about 3-1/2 points from 5 AM yesterday, and she is reassured.  There are no signs of infection.  She raises the additional complaint of transient swelling in her right calf and the top of her ankle.  She has no history of DVT or PE.  She has not had hemoptysis or cough.  However, she is concerned about a family history of factor V Leiden, and is generally in an elevated stressed/emotional condition, quite understandably, due to significant complications related to her delivery.  She is very concerned that she might have a DVT.    PAST MEDICAL HISTORY   has a past medical history of Arrhythmia, Bipolar affective disorder (HCC), Breath shortness, Complete placenta previa nos or without hemorrhage,  second trimester (2023), Connective tissue anomaly, Depression, Gastroparesis, Heart burn, Hypothyroid, Pain, Pregnant, and PTSD (post-traumatic stress disorder).    SURGICAL HISTORY   has a past surgical history that includes knee arthroscopy (10/2009); dental extraction(s) (); knee arthroscopy (2010); synovectomy (2010); meniscectomy, knee, medial (2010); other (2018);  delivery only (N/A, 2020); repeat c section (N/A, 2023); and dilation and curettage (N/A, 2023).    FAMILY HISTORY  Family History   Problem Relation Age of Onset    Depression Mother     Heart Disease Mother     Bipolar disorder Maternal Aunt     Diabetes Other        SOCIAL HISTORY  Social History     Tobacco Use    Smoking status: Former     Packs/day: 0.50     Years: 1.50     Pack years: 0.75     Types: Cigarettes     Quit date: 2009     Years since quittin.7    Smokeless tobacco: Never   Vaping Use    Vaping Use: Never used   Substance and Sexual Activity    Alcohol use: No    Drug use: Not Currently     Types: Marijuana, Inhaled    Sexual activity: Yes     Partners: Male       CURRENT MEDICATIONS  Home Medications       Reviewed by Marychuy Hurt R.N. (Registered Nurse) on 23 at 1113  Med List Status: <None>     Medication Last Dose Status   acetaminophen (TYLENOL) 500 MG Tab  Active   docusate sodium 100 MG Cap  Active   ferrous sulfate 325 (65 Fe) MG tablet  Active   gabapentin (NEURONTIN) 300 MG Cap  Active   ibuprofen (MOTRIN) 800 MG Tab  Active   levothyroxine (SYNTHROID) 125 MCG Tab  Active   oxyCODONE immediate-release (ROXICODONE) 5 MG Tab  Active   ziprasidone (GEODON) 80 MG Cap  Active                    ALLERGIES  Allergies   Allergen Reactions    Reglan [Metoclopramide]     Codeine Itching    Fentanyl Photosensitivity     Reports blurred vision     Hydrocodone Itching    Zoloft Swelling     Left hand swelling       PHYSICAL EXAM  VITAL SIGNS: /74   Pulse 71   Temp  "36.8 °C (98.2 °F) (Temporal)   Resp 18   Ht 1.6 m (5' 3\")   Wt 63.4 kg (139 lb 12.4 oz)   LMP 09/01/2022 (Exact Date)   SpO2 100%   BMI 24.76 kg/m²   Pulse ox interpretation: I interpret this pulse ox as normal.  Constitutional: Alert in no apparent distress.  HENT: No signs of trauma, Bilateral external ears normal, Nose normal.   Eyes: Pupils are equal and reactive, Conjunctiva normal, Non-icteric.   Neck: Normal range of motion, Supple, No stridor.    Cardiovascular: Normal peripheral perfusion  Thorax & Lungs: Unlabored respirations, equal chest expansion, no accessory muscle use  Abdomen: Non-distended  Skin:  No erythema, No rash.   Back: Normal alignment and ROM  Extremities: No gross deformity.  No peripheral edema.  No calf tenderness.  Musculoskeletal: Good range of motion in all major joints.   Neurologic: Alert, Normal motor function, No focal deficits noted.   Psychiatric: Affect normal, Judgment normal, Mood normal.      DIAGNOSTIC STUDIES / PROCEDURES    LABS  Labs Reviewed   CBC WITH DIFFERENTIAL - Abnormal; Notable for the following components:       Result Value    RBC 3.50 (*)     Hemoglobin 10.4 (*)     Hematocrit 31.9 (*)     RDW 51.8 (*)     Neutrophils-Polys 74.80 (*)     Lymphocytes 13.90 (*)     Immature Granulocytes 1.00 (*)     All other components within normal limits   COMP METABOLIC PANEL - Abnormal; Notable for the following components:    AST(SGOT) 11 (*)     Alkaline Phosphatase 145 (*)     All other components within normal limits   HCG QUAL SERUM - Abnormal; Notable for the following components:    Beta-Hcg Qualitative Serum Positive (*)     All other components within normal limits   URINALYSIS,CULTURE IF INDICATED - Abnormal; Notable for the following components:    Occult Blood Small (*)     All other components within normal limits    Narrative:     Indication for culture:->Patient WITHOUT an indwelling Holt  catheter in place with new onset of Dysuria, " Frequency,  Urgency, and/or Suprapubic pain   LIPASE   URINE MICROSCOPIC (W/UA)    Narrative:     Indication for culture:->Patient WITHOUT an indwelling Holt  catheter in place with new onset of Dysuria, Frequency,  Urgency, and/or Suprapubic pain   CORRECTED CALCIUM   ESTIMATED GFR         RADIOLOGY  I have independently interpreted the diagnostic imaging associated with this visit and am waiting the final reading from the radiologist.   My preliminary interpretation is as follows: Normal  Radiologist interpretation:   US-EXTREMITY VENOUS LOWER UNILAT RIGHT               COURSE & MEDICAL DECISION MAKING    ED Observation Status? Yes; I am placing the patient in to an observation status due to a diagnostic uncertainty as well as therapeutic intensity. Patient placed in observation status at 12:19 PM, 6/7/2023.     Observation plan is as follows: Laboratory and imaging evaluation to address the patient's concerns    Upon Reevaluation, the patient's condition is stable, without evidence of DVT, significant anemia, infection or any dangerous cause of her symptoms.    Patient discharged from ED Observation status at 1:41 PM (Time) 06/07/23 (Date).     INITIAL ASSESSMENT, COURSE AND PLAN  Care Narrative:     12:15 PM patient evaluated at the bedside.  Differential includes but is not limited to postpartum hemorrhage with or without anemia, DVT versus transient peripheral edema, possibly secondary to anemia or circulatory changes related to recent delivery.    12:20 PM labs are very reassuring with respect to anemia, which appears to be resolved.  Over the past nearly 36 hours, the patient's hemoglobin has trended upward quickly.    1:41 PM DVT ultrasound was negative.  Patient's symptoms do not seem to have a dangerous cause.  She will schedule follow-up with her OB/GYN, and is safe and appropriate for discharge.    HTN/IDDM FOLLOW UP:  The patient is referred to a primary physician for blood pressure management,  diabetic screening, and for all other preventive health concerns        DISPOSITION AND DISCUSSIONS    Escalation of care considered, and ultimately not performed:acute inpatient care management, however at this time, the patient is most appropriate for outpatient management    Barriers to care at this time, including but not limited to: Patient lacks transportation .     Decision tools and prescription drugs considered including, but not limited to: Medication modification considered, but is not currently indicated .    FINAL DIAGNOSIS  1. Leg swelling    2. Peripheral edema           Electronically signed by: Sreedhar Carpenter M.D., 6/7/2023 11:55 AM

## 2023-06-07 NOTE — ED NOTES
Pt ambulated to room Blue 17 with steady gait. Pt placed on monitors. Chart up for ERP.     Urine sample collected and sent to lab.

## 2023-06-08 ENCOUNTER — GYNECOLOGY VISIT (OUTPATIENT)
Dept: OBGYN | Facility: CLINIC | Age: 38
End: 2023-06-08
Payer: MEDICARE

## 2023-06-08 VITALS
DIASTOLIC BLOOD PRESSURE: 92 MMHG | WEIGHT: 137.2 LBS | HEART RATE: 88 BPM | SYSTOLIC BLOOD PRESSURE: 141 MMHG | OXYGEN SATURATION: 99 % | BODY MASS INDEX: 24.3 KG/M2

## 2023-06-08 PROCEDURE — 3077F SYST BP >= 140 MM HG: CPT | Performed by: OBSTETRICS & GYNECOLOGY

## 2023-06-08 PROCEDURE — 0503F POSTPARTUM CARE VISIT: CPT | Performed by: OBSTETRICS & GYNECOLOGY

## 2023-06-08 PROCEDURE — 3080F DIAST BP >= 90 MM HG: CPT | Performed by: OBSTETRICS & GYNECOLOGY

## 2023-06-08 ASSESSMENT — FIBROSIS 4 INDEX: FIB4 SCORE: 0.54

## 2023-06-08 NOTE — PROGRESS NOTES
Kristen Luna Is a 38 y.o.  status post  delivery on 23. She had PPH and required post op suction D&C and Bakri balloon placement. She was transfused postpartum. She was seen in ED yesterday for right ankle/leg swelling and DVT was ruled out. Her HgB was 10.4 which is improved from discharge. Patient is here today for an incision check.  Pain is poorly controlled.  Baby was given up for adoption to her brother. Minimal lochia.  No bowel or bladder complaints.       BP (!) 141/92 (BP Location: Left arm, Patient Position: Sitting, BP Cuff Size: Adult)   Pulse 88   Wt 137 lb 3.2 oz   LMP 2022 (Exact Date)   SpO2 99%   BMI 24.30 kg/m²   ABD  -soft, nontender, nondistended. FF below u.   Incision  - Mepelex dressing intact and clean, not removed    Assessment and plan  Status post  delivery  Patient still with pain, still has oxycodone at home and will use as indicated. Risks discussed.     F/U one week to recheck

## 2023-06-08 NOTE — PROGRESS NOTES
Patient is here for a Post Partum. She had a  on 23. Patient is having lots of pain and dizziness. Also, her feet are swelling up. Patient went to the ER yesterday 23.

## 2023-06-14 ENCOUNTER — GYNECOLOGY VISIT (OUTPATIENT)
Dept: OBGYN | Facility: CLINIC | Age: 38
End: 2023-06-14
Payer: MEDICARE

## 2023-06-14 VITALS — SYSTOLIC BLOOD PRESSURE: 135 MMHG | DIASTOLIC BLOOD PRESSURE: 79 MMHG

## 2023-06-14 PROCEDURE — 0503F POSTPARTUM CARE VISIT: CPT | Performed by: OBSTETRICS & GYNECOLOGY

## 2023-06-14 PROCEDURE — 3078F DIAST BP <80 MM HG: CPT | Performed by: OBSTETRICS & GYNECOLOGY

## 2023-06-14 PROCEDURE — 3075F SYST BP GE 130 - 139MM HG: CPT | Performed by: OBSTETRICS & GYNECOLOGY

## 2023-06-14 NOTE — PROGRESS NOTES
Kristen Luna Is a 38 y.o.  status post  delivery on 23. Patient is here today for an incision check.  Pain is not well controlled but she is moving well and looks a lot better than last week. She says she still has occasional dizziness but again is moving a goes from laying to sitting to standing in room without any problem. Her color is better than last week as well. She reports the bleeding is still but is lighter overall.  No bowel or bladder complaints. The baby was given for adoption to her brother.       /79 (BP Location: Right arm, Patient Position: Sitting, BP Cuff Size: Adult)   LMP 2022 (Exact Date)   Breastfeeding No   ABD  -soft, nontender, nondistended. FF and 5 cm below u  Incision  -healing well, clean, dry, intact.    Assessment and plan  Status post  delivery  No complications incision healing well  Followup in 4 weeks for final postpartum checkup        Writer called mother to gather more information.   Patient is having a flare seb derm. Patient has had some drainage on her scalp. Symptoms started on Thursday.     Pt has V visit with NP yesterday.  Since, drainage has decreased    Currently: Mom has been using ketoconazole shampoo and cream, 2 x per week and alternating. Mom has been doing warm olive oil treatments and tea tree oil, and head and shoulders.    Patient scalp is sore.  Pt has apt on 2/22/22. Mom is looking for more recommendations until then. Please advise.     Last ov: 9/30/21  # Seborrheic dermatitis - not addressed today  Exam: clear scalp and face today  Chronic, stable      Discussion:   This is a chronic and recurring condition arising from over-reaction to body yeast/fungus.  Often occurs on face, scalp, ears, chest areas.  Treatment is directed at decreasing inflammation through topical antifungal medications and other anti-inflammatory medications.       Recommend:   For the face:  - For flares- Only use hydrocortisone when bad, and not more than 15 days per month  - Use ketoconazole 2% cream once daily     Scalp- when poorly controlled  Oil therapy to help lift off plaque   Warm olive oil or coconut oil so that it's warm and not scalding hot  Apply to scalp and massage in  Wear shower cap for 30 minutes   With a comb, gently remove the scalp plaques (do not rub harshly)      Wash regimen  Alternate between each washes. Wash at least once weekly   Ketoconazole 2% shampoo: massage into scalp and leave on for 10 minutes before rinsing (prescription)  T/SAL shampoo: massage into scalp and leave on for 10 minutes before rinsing (over the counter)  Note: this is a SCALP massage.  You can use a regular shampoo to wash your hair.      Scalp massage regimen (for itching, as needed)  -Triamcinolone 0.1% lotion: Apply to 5 parts of the scalp once daily as needed for itching. Do not use more than 15 days out of each month.   - Use head and shoulders  leave in spray (zinc pyrithione) up to 4 times daily     Patient verbalized understanding and is in agreement with above plans.  Encouraged to call with any questions/concerns.

## 2023-06-16 ENCOUNTER — TELEPHONE (OUTPATIENT)
Dept: OBGYN | Facility: CLINIC | Age: 38
End: 2023-06-16
Payer: MEDICARE

## 2023-06-16 ENCOUNTER — APPOINTMENT (OUTPATIENT)
Dept: RADIOLOGY | Facility: MEDICAL CENTER | Age: 38
End: 2023-06-16
Attending: EMERGENCY MEDICINE
Payer: MEDICARE

## 2023-06-16 ENCOUNTER — HOSPITAL ENCOUNTER (EMERGENCY)
Facility: MEDICAL CENTER | Age: 38
End: 2023-06-16
Attending: EMERGENCY MEDICINE
Payer: MEDICARE

## 2023-06-16 VITALS
WEIGHT: 132.06 LBS | TEMPERATURE: 97.2 F | OXYGEN SATURATION: 100 % | HEART RATE: 63 BPM | SYSTOLIC BLOOD PRESSURE: 136 MMHG | RESPIRATION RATE: 16 BRPM | BODY MASS INDEX: 23.4 KG/M2 | DIASTOLIC BLOOD PRESSURE: 81 MMHG | HEIGHT: 63 IN

## 2023-06-16 DIAGNOSIS — R11.2 NAUSEA AND VOMITING, UNSPECIFIED VOMITING TYPE: ICD-10-CM

## 2023-06-16 LAB
ALBUMIN SERPL BCP-MCNC: 3.9 G/DL (ref 3.2–4.9)
ALBUMIN/GLOB SERPL: 1.4 G/DL
ALP SERPL-CCNC: 100 U/L (ref 30–99)
ALT SERPL-CCNC: 10 U/L (ref 2–50)
ANION GAP SERPL CALC-SCNC: 14 MMOL/L (ref 7–16)
AST SERPL-CCNC: 10 U/L (ref 12–45)
BASOPHILS # BLD AUTO: 0.3 % (ref 0–1.8)
BASOPHILS # BLD: 0.02 K/UL (ref 0–0.12)
BILIRUB SERPL-MCNC: 0.3 MG/DL (ref 0.1–1.5)
BUN SERPL-MCNC: 12 MG/DL (ref 8–22)
CALCIUM ALBUM COR SERPL-MCNC: 9.1 MG/DL (ref 8.5–10.5)
CALCIUM SERPL-MCNC: 9 MG/DL (ref 8.5–10.5)
CHLORIDE SERPL-SCNC: 106 MMOL/L (ref 96–112)
CO2 SERPL-SCNC: 22 MMOL/L (ref 20–33)
CREAT SERPL-MCNC: 0.54 MG/DL (ref 0.5–1.4)
EOSINOPHIL # BLD AUTO: 0.08 K/UL (ref 0–0.51)
EOSINOPHIL NFR BLD: 1.3 % (ref 0–6.9)
ERYTHROCYTE [DISTWIDTH] IN BLOOD BY AUTOMATED COUNT: 54.9 FL (ref 35.9–50)
GFR SERPLBLD CREATININE-BSD FMLA CKD-EPI: 121 ML/MIN/1.73 M 2
GLOBULIN SER CALC-MCNC: 2.7 G/DL (ref 1.9–3.5)
GLUCOSE SERPL-MCNC: 88 MG/DL (ref 65–99)
HCT VFR BLD AUTO: 35.3 % (ref 37–47)
HGB BLD-MCNC: 11.5 G/DL (ref 12–16)
IMM GRANULOCYTES # BLD AUTO: 0.02 K/UL (ref 0–0.11)
IMM GRANULOCYTES NFR BLD AUTO: 0.3 % (ref 0–0.9)
LYMPHOCYTES # BLD AUTO: 1.55 K/UL (ref 1–4.8)
LYMPHOCYTES NFR BLD: 25 % (ref 22–41)
MCH RBC QN AUTO: 30.9 PG (ref 27–33)
MCHC RBC AUTO-ENTMCNC: 32.6 G/DL (ref 32.2–35.5)
MCV RBC AUTO: 94.9 FL (ref 81.4–97.8)
MONOCYTES # BLD AUTO: 0.46 K/UL (ref 0–0.85)
MONOCYTES NFR BLD AUTO: 7.4 % (ref 0–13.4)
NEUTROPHILS # BLD AUTO: 4.06 K/UL (ref 1.82–7.42)
NEUTROPHILS NFR BLD: 65.7 % (ref 44–72)
NRBC # BLD AUTO: 0 K/UL
NRBC BLD-RTO: 0 /100 WBC (ref 0–0.2)
PLATELET # BLD AUTO: 267 K/UL (ref 164–446)
PMV BLD AUTO: 9.6 FL (ref 9–12.9)
POTASSIUM SERPL-SCNC: 3.7 MMOL/L (ref 3.6–5.5)
PROT SERPL-MCNC: 6.6 G/DL (ref 6–8.2)
RBC # BLD AUTO: 3.72 M/UL (ref 4.2–5.4)
SODIUM SERPL-SCNC: 142 MMOL/L (ref 135–145)
WBC # BLD AUTO: 6.2 K/UL (ref 4.8–10.8)

## 2023-06-16 PROCEDURE — 36415 COLL VENOUS BLD VENIPUNCTURE: CPT

## 2023-06-16 PROCEDURE — 80053 COMPREHEN METABOLIC PANEL: CPT

## 2023-06-16 PROCEDURE — 85025 COMPLETE CBC W/AUTO DIFF WBC: CPT

## 2023-06-16 PROCEDURE — 99283 EMERGENCY DEPT VISIT LOW MDM: CPT

## 2023-06-16 PROCEDURE — 700105 HCHG RX REV CODE 258: Mod: UD | Performed by: EMERGENCY MEDICINE

## 2023-06-16 PROCEDURE — 76856 US EXAM PELVIC COMPLETE: CPT

## 2023-06-16 RX ORDER — ONDANSETRON 4 MG/1
4 TABLET, ORALLY DISINTEGRATING ORAL EVERY 6 HOURS PRN
Qty: 10 TABLET | Refills: 0 | Status: SHIPPED | OUTPATIENT
Start: 2023-06-16 | End: 2023-06-23 | Stop reason: CLARIF

## 2023-06-16 RX ORDER — PROCHLORPERAZINE EDISYLATE 5 MG/ML
10 INJECTION INTRAMUSCULAR; INTRAVENOUS ONCE
Status: DISCONTINUED | OUTPATIENT
Start: 2023-06-16 | End: 2023-06-16 | Stop reason: HOSPADM

## 2023-06-16 RX ORDER — DIPHENHYDRAMINE HYDROCHLORIDE 50 MG/ML
25 INJECTION INTRAMUSCULAR; INTRAVENOUS ONCE
Status: DISCONTINUED | OUTPATIENT
Start: 2023-06-16 | End: 2023-06-16 | Stop reason: HOSPADM

## 2023-06-16 RX ORDER — SODIUM CHLORIDE 9 MG/ML
1000 INJECTION, SOLUTION INTRAVENOUS ONCE
Status: COMPLETED | OUTPATIENT
Start: 2023-06-16 | End: 2023-06-16

## 2023-06-16 RX ADMIN — SODIUM CHLORIDE 1000 ML: 9 INJECTION, SOLUTION INTRAVENOUS at 18:12

## 2023-06-16 ASSESSMENT — FIBROSIS 4 INDEX: FIB4 SCORE: 0.54

## 2023-06-16 NOTE — ED TRIAGE NOTES
"Chief Complaint   Patient presents with    N/V     Pt two weeks postpartum, pt is currently having nausea and vomit, headache 7/10, pt took zofran but it is not helping with the symptoms, after delivery pt started hemorrhaging and required a DNC to have 3 units of blood.       /72   Pulse 75   Temp 36.2 °C (97.2 °F) (Temporal)   Resp 16   Ht 1.6 m (5' 3\")   Wt 59.9 kg (132 lb 0.9 oz)   LMP 09/01/2022 (Exact Date) Comment: two weeks postpartum  SpO2 98%   Breastfeeding No   BMI 23.39 kg/m²     "

## 2023-06-16 NOTE — TELEPHONE ENCOUNTER
Pt called concerned that she is having complications from her c/section and D&C on 06/02/23   She states she is having really bad headaches, light headed, vomiting and chills.   She wanted to go to L&D but when she called them they told her she is too far post partum to go back, they reccommended her to go to the ER but she wanted to make sure we couldn't get her in. She called at 3:30 and we didn't have any more appts available for the day. I told her to go to the ER and she said she will

## 2023-06-17 LAB — BLOOD CULTURE HOLD CXBCH: NORMAL

## 2023-06-17 NOTE — ED NOTES
Patient escorted from waiting lobby to assigned room. Patient brought to room via wheelchair to without incident. Assisted patient with gown and placed on monitoring. Patient given call button for assistance. Chart up for ERP.

## 2023-06-17 NOTE — ED PROVIDER NOTES
ED Provider Note    CHIEF COMPLAINT  Chief Complaint   Patient presents with    N/V     Pt two weeks postpartum, pt is currently having nausea and vomit, headache 7/10, pt took zofran but it is not helping with the symptoms, after delivery pt started hemorrhaging and required a DNC to have 3 units of blood.         EXTERNAL RECORDS REVIEWED  None     HPI/ROS  LIMITATION TO HISTORY   None  OUTSIDE HISTORIAN(S):  None    Kristen Luna is a 38 y.o. female who presents here for evaluation of nausea and vomiting.  Patient states she is having nausea and vomiting intermittently over the last few days, and still has some mild vaginal bleeding.  Patient had some clots 2 days ago, but none since.  She just had some mild spotting.  She has no fever or chills, and no chest pain or shortness of breath.  Patient states she also has some headaches as well, that she has been experiencing.    PAST MEDICAL HISTORY   has a past medical history of Arrhythmia, Bipolar affective disorder (HCC), Breath shortness, Complete placenta previa nos or without hemorrhage, second trimester (2023), Connective tissue anomaly, Depression, Gastroparesis, Heart burn, Hypothyroid, Pain, Pregnant, and PTSD (post-traumatic stress disorder).    SURGICAL HISTORY   has a past surgical history that includes knee arthroscopy (10/2009); dental extraction(s) (); knee arthroscopy (2010); synovectomy (2010); meniscectomy, knee, medial (2010); other ();  delivery only (N/A, 2020); repeat c section (N/A, 2023); and dilation and curettage (N/A, 2023).    FAMILY HISTORY  Family History   Problem Relation Age of Onset    Depression Mother     Heart Disease Mother     Bipolar disorder Maternal Aunt     Diabetes Other        SOCIAL HISTORY  Social History     Tobacco Use    Smoking status: Former     Packs/day: 0.50     Years: 1.50     Pack years: 0.75     Types: Cigarettes     Quit date: 2009     Years since  "quittin.8    Smokeless tobacco: Never   Vaping Use    Vaping Use: Never used   Substance and Sexual Activity    Alcohol use: No    Drug use: Not Currently     Types: Marijuana, Inhaled    Sexual activity: Yes     Partners: Male       CURRENT MEDICATIONS  Home Medications       Reviewed by Roney Mchugh R.N. (Registered Nurse) on 23 at 1651  Med List Status: Not Addressed     Medication Last Dose Status   acetaminophen (TYLENOL) 500 MG Tab  Active   docusate sodium 100 MG Cap  Active   ferrous sulfate 325 (65 Fe) MG tablet  Active   gabapentin (NEURONTIN) 300 MG Cap  Active   ibuprofen (MOTRIN) 800 MG Tab  Active   levothyroxine (SYNTHROID) 125 MCG Tab  Active   ziprasidone (GEODON) 80 MG Cap  Active                    ALLERGIES  Allergies   Allergen Reactions    Reglan [Metoclopramide]     Codeine Itching    Fentanyl Photosensitivity     Reports blurred vision     Hydrocodone Itching    Zoloft Swelling     Left hand swelling       PHYSICAL EXAM  VITAL SIGNS: BP (!) 148/75   Pulse 69   Temp 36.2 °C (97.2 °F) (Temporal)   Resp 16   Ht 1.6 m (5' 3\")   Wt 59.9 kg (132 lb 0.9 oz)   LMP 2022 (Exact Date) Comment: two weeks postpartum  SpO2 98%   Breastfeeding No   BMI 23.39 kg/m²    Constitutional: Well developed, well nourished. No acute distress.  HEENT: Normocephalic, atraumatic. Posterior pharynx clear and moist.  Eyes:  EOMI. Normal sclera.  Neck: Supple, Full range of motion, nontender.  Chest/Pulmonary: clear to ausculation. Symmetrical expansion.   Cardio: Regular rate and rhythm with no murmur.   Abdomen: Soft, suprapubic tenderness, no peritoneal signs. No guarding. No palpable masses.  Back: No CVA tenderness, nontender midline, no step offs.  Musculoskeletal: No deformity, no edema, neurovascular intact.   Neuro: Clear speech, appropriate, cooperative, cranial nerves II-XII grossly intact.  Psych: Normal mood and affect      DIAGNOSTIC STUDIES / PROCEDURES  Results for orders placed " or performed during the hospital encounter of 06/16/23   CBC WITH DIFFERENTIAL   Result Value Ref Range    WBC 6.2 4.8 - 10.8 K/uL    RBC 3.72 (L) 4.20 - 5.40 M/uL    Hemoglobin 11.5 (L) 12.0 - 16.0 g/dL    Hematocrit 35.3 (L) 37.0 - 47.0 %    MCV 94.9 81.4 - 97.8 fL    MCH 30.9 27.0 - 33.0 pg    MCHC 32.6 32.2 - 35.5 g/dL    RDW 54.9 (H) 35.9 - 50.0 fL    Platelet Count 267 164 - 446 K/uL    MPV 9.6 9.0 - 12.9 fL    Neutrophils-Polys 65.70 44.00 - 72.00 %    Lymphocytes 25.00 22.00 - 41.00 %    Monocytes 7.40 0.00 - 13.40 %    Eosinophils 1.30 0.00 - 6.90 %    Basophils 0.30 0.00 - 1.80 %    Immature Granulocytes 0.30 0.00 - 0.90 %    Nucleated RBC 0.00 0.00 - 0.20 /100 WBC    Neutrophils (Absolute) 4.06 1.82 - 7.42 K/uL    Lymphs (Absolute) 1.55 1.00 - 4.80 K/uL    Monos (Absolute) 0.46 0.00 - 0.85 K/uL    Eos (Absolute) 0.08 0.00 - 0.51 K/uL    Baso (Absolute) 0.02 0.00 - 0.12 K/uL    Immature Granulocytes (abs) 0.02 0.00 - 0.11 K/uL    NRBC (Absolute) 0.00 K/uL   COMP METABOLIC PANEL   Result Value Ref Range    Sodium 142 135 - 145 mmol/L    Potassium 3.7 3.6 - 5.5 mmol/L    Chloride 106 96 - 112 mmol/L    Co2 22 20 - 33 mmol/L    Anion Gap 14.0 7.0 - 16.0    Glucose 88 65 - 99 mg/dL    Bun 12 8 - 22 mg/dL    Creatinine 0.54 0.50 - 1.40 mg/dL    Calcium 9.0 8.5 - 10.5 mg/dL    AST(SGOT) 10 (L) 12 - 45 U/L    ALT(SGPT) 10 2 - 50 U/L    Alkaline Phosphatase 100 (H) 30 - 99 U/L    Total Bilirubin 0.3 0.1 - 1.5 mg/dL    Albumin 3.9 3.2 - 4.9 g/dL    Total Protein 6.6 6.0 - 8.2 g/dL    Globulin 2.7 1.9 - 3.5 g/dL    A-G Ratio 1.4 g/dL   CORRECTED CALCIUM   Result Value Ref Range    Correct Calcium 9.1 8.5 - 10.5 mg/dL   ESTIMATED GFR   Result Value Ref Range    GFR (CKD-EPI) 121 >60 mL/min/1.73 m 2        RADIOLOGY  I have independently interpreted the diagnostic imaging associated with this visit and am waiting the final reading from the radiologist.   My preliminary interpretation is as follows: See  "below  Radiologist interpretation:   US-PELVIC TRANSABDOMINAL ONLY   Final Result      1.  Prominent uterus consistent with postpartum status.   2.  Heterogeneous material in the endometrial canal likely indicating clot.  No hyperemic components to indicate retained products of conception, however retained products are difficult to entirely exclude.   3.  LEFT ovary is not visualized.            COURSE & MEDICAL DECISION MAKING    Discharge    INITIAL ASSESSMENT, COURSE AND PLAN  Care Narrative: This is a 38-year-old female who recently delivered.  She came here because she was having some nausea and vomiting with her last episode being this morning.  She has not had any since even after drinking fluids.  Patient also has some mild vaginal bleeding, but saw Dr. Murdock 2 days ago for the same in the office, and she was happy with the patient's progress.  Patient herself is here because she wanted to be sure that nothing else was going on.  Her ultrasound shows no acute finding, she is nontoxic-appearing, afebrile and comfortable.  She will be discharged home, and will return for any further issues or concerns.    9:09 PM  The pt has declined compazine / benadryl, and then declined motrin and zofran here. The pts mother states \"we just want our discharge papers.'     DISPOSITION AND DISCUSSIONS  I have discussed management of the patient with the following physicians and WERO's: None    Discussion of management with other QHP or appropriate source(s): None    Escalation of care considered, and ultimately not performed: None    Barriers to care at this time, including but not limited to: Patient does not have established PCP.     Decision tools and prescription drugs considered including, but not limited to:  None .    FINAL DIAGNOSIS  1. Nausea and vomiting, unspecified vomiting type           Electronically signed by: Hao Arzola D.O., 6/16/2023 8:13 PM      "

## 2023-06-20 ENCOUNTER — HOSPITAL ENCOUNTER (OUTPATIENT)
Facility: MEDICAL CENTER | Age: 38
End: 2023-06-20
Attending: OBSTETRICS & GYNECOLOGY
Payer: MEDICARE

## 2023-06-20 ENCOUNTER — GYNECOLOGY VISIT (OUTPATIENT)
Dept: OBGYN | Facility: CLINIC | Age: 38
End: 2023-06-20
Payer: MEDICARE

## 2023-06-20 VITALS
DIASTOLIC BLOOD PRESSURE: 84 MMHG | WEIGHT: 131.2 LBS | SYSTOLIC BLOOD PRESSURE: 137 MMHG | TEMPERATURE: 100.4 F | BODY MASS INDEX: 23.24 KG/M2

## 2023-06-20 DIAGNOSIS — Z12.4 CERVICAL CANCER SCREENING: ICD-10-CM

## 2023-06-20 DIAGNOSIS — N89.8 VAGINAL DISCHARGE: ICD-10-CM

## 2023-06-20 DIAGNOSIS — N71.9 ENDOMETRITIS: Primary | ICD-10-CM

## 2023-06-20 PROCEDURE — 88175 CYTOPATH C/V AUTO FLUID REDO: CPT

## 2023-06-20 PROCEDURE — 3079F DIAST BP 80-89 MM HG: CPT | Performed by: OBSTETRICS & GYNECOLOGY

## 2023-06-20 PROCEDURE — 3075F SYST BP GE 130 - 139MM HG: CPT | Performed by: OBSTETRICS & GYNECOLOGY

## 2023-06-20 PROCEDURE — 87624 HPV HI-RISK TYP POOLED RSLT: CPT

## 2023-06-20 PROCEDURE — 99213 OFFICE O/P EST LOW 20 MIN: CPT | Performed by: OBSTETRICS & GYNECOLOGY

## 2023-06-20 RX ORDER — DOXYCYCLINE HYCLATE 100 MG/1
100 CAPSULE ORAL 2 TIMES DAILY
Qty: 14 EACH | Refills: 0 | Status: SHIPPED | OUTPATIENT
Start: 2023-06-20 | End: 2023-06-27

## 2023-06-20 RX ORDER — AMOXICILLIN AND CLAVULANATE POTASSIUM 875; 125 MG/1; MG/1
1 TABLET, FILM COATED ORAL 2 TIMES DAILY
Qty: 14 TABLET | Refills: 0 | Status: SHIPPED | OUTPATIENT
Start: 2023-06-20 | End: 2023-06-27

## 2023-06-20 ASSESSMENT — FIBROSIS 4 INDEX: FIB4 SCORE: 0.45

## 2023-06-20 NOTE — PROGRESS NOTES
Subjective     Kristen Luna is a 38 y.o. female who presents with Gynecologic Exam (Pt here for ER follow up )    CC: ER follow up, low grade fever at home, chills, bleeding has improved, but still having painful cramping    HPI: Kristen is a 38-year-old -0-0-2 s/p repeat  on 2023 that was complicated by a post partum hemorrhage with suction D&C. She presented to the ER on 23 for nausea and a headache. She had an US that showed a blood clot in her uterus, but otherwise it was normal. Urine pregnancy test is negative in the office today. She complains about a low grade fever and chills at home. Her bleeding has improved, but she has been having painful cramping. She has been emotional and she just doesn't feel right.               Objective     /84 (BP Location: Right arm, Patient Position: Sitting, BP Cuff Size: Adult)   Wt 131 lb 3.2 oz   LMP 2022 (Exact Date) Comment: two weeks postpartum  BMI 23.24 kg/m²      Physical Exam  Exam conducted with a chaperone present.   Constitutional:       Appearance: Normal appearance.   HENT:      Head: Normocephalic and atraumatic.      Mouth/Throat:      Mouth: Mucous membranes are moist.   Pulmonary:      Effort: Pulmonary effort is normal. No respiratory distress.   Genitourinary:     Labia:         Right: No rash, tenderness or lesion.         Left: No rash, tenderness or lesion.       Vagina: Vaginal discharge and bleeding present.      Uterus: Tender.    Neurological:      Mental Status: She is alert.                             Assessment & Plan     Kristen was seen today for gynecologic exam.    Diagnoses and all orders for this visit:    Endometritis  - she might have post partum endometritis based on low grade fever in office and uterine tenderness on exam. WBC count was normal in the ER on 2023. Will treat with antibiotics and follow up in a few days. Take antibiotics with food. Discussed side effects such as GI  distress.   -     amoxicillin-clavulanate (AUGMENTIN) 875-125 MG Tab; Take 1 Tablet by mouth 2 times a day for 7 days.  -     doxycycline (VIBRAMYCIN) 100 MG Cap; Take 1 Capsule by mouth 2 times a day for 7 days.    Cervical cancer screening  -     THINPREP PAP WITH HPV; Future    Return: Follow up in a few days. Go back to the ER for worsening symptoms such as worsening fever/chills, worsening pain, or increased bleeding.    Renetta Baig M.D.

## 2023-06-21 LAB
CYTOLOGY REG CYTOL: NORMAL
HPV HR 12 DNA CVX QL NAA+PROBE: NEGATIVE
HPV16 DNA SPEC QL NAA+PROBE: NEGATIVE
HPV18 DNA SPEC QL NAA+PROBE: NEGATIVE
SPECIMEN SOURCE: NORMAL

## 2023-06-22 ENCOUNTER — TELEPHONE (OUTPATIENT)
Dept: OBGYN | Facility: CLINIC | Age: 38
End: 2023-06-22
Payer: MEDICARE

## 2023-06-22 NOTE — TELEPHONE ENCOUNTER
Pt called in stating that she was seen by  on 06/20/2023 for an infection after delivery. Pt states that she is having a fever, and worsening pain that Ibuprofen is no longer helping. Per 's last note this pt should seek care at ER for these worsening symptoms. Pt informed of this recommendation and is agreeable to plan. No further questions.

## 2023-06-23 ENCOUNTER — OFFICE VISIT (OUTPATIENT)
Dept: MEDICAL GROUP | Facility: CLINIC | Age: 38
End: 2023-06-23
Payer: MEDICARE

## 2023-06-23 ENCOUNTER — TELEPHONE (OUTPATIENT)
Dept: OBGYN | Facility: CLINIC | Age: 38
End: 2023-06-23

## 2023-06-23 VITALS — WEIGHT: 129 LBS | BODY MASS INDEX: 22.86 KG/M2 | HEIGHT: 63 IN

## 2023-06-23 DIAGNOSIS — R11.2 NAUSEA AND VOMITING, UNSPECIFIED VOMITING TYPE: ICD-10-CM

## 2023-06-23 DIAGNOSIS — N71.9 ENDOMETRITIS: ICD-10-CM

## 2023-06-23 PROCEDURE — 99213 OFFICE O/P EST LOW 20 MIN: CPT | Mod: GE | Performed by: STUDENT IN AN ORGANIZED HEALTH CARE EDUCATION/TRAINING PROGRAM

## 2023-06-23 RX ORDER — ONDANSETRON 4 MG/1
4 TABLET, FILM COATED ORAL EVERY 4 HOURS PRN
Qty: 20 TABLET | Refills: 1 | Status: SHIPPED | OUTPATIENT
Start: 2023-06-23 | End: 2024-02-02

## 2023-06-23 ASSESSMENT — FIBROSIS 4 INDEX: FIB4 SCORE: 0.45

## 2023-06-23 NOTE — ASSESSMENT & PLAN NOTE
Advised patient to continue to follow OB/GYN recommendations.  Encouraged her to continue taking Augmentin and doxycycline.  Encourage patient to take Zofran as needed for nausea and vomiting with the doxycycline.  Encourage patient to return to the emergency room for worsening fever, chills, bleeding.  She is agreeable with this plan.

## 2023-06-23 NOTE — PROGRESS NOTES
Subjective:     CC: follow up    HPI:   Kristen presents today with :    Problem   Endometritis    Patient is here today to discuss ongoing endometritis.  She is followed closely by OB/GYN through [facility].  She is currently on doxycycline and Augmentin.  She has a follow-up appointment scheduled for June 27.  She continues to have cramping pain.  She is concerned by this Cramping pain         Current Outpatient Medications Ordered in Epic   Medication Sig Dispense Refill    ondansetron (ZOFRAN ODT) 4 MG TABLET DISPERSIBLE Take 4 mg by mouth every four hours as needed for Nausea. 20 Tablet 1    amoxicillin-clavulanate (AUGMENTIN) 875-125 MG Tab Take 1 Tablet by mouth 2 times a day for 7 days. 14 Tablet 0    doxycycline (VIBRAMYCIN) 100 MG Cap Take 1 Capsule by mouth 2 times a day for 7 days. 14 Each 0    ibuprofen (MOTRIN) 800 MG Tab Take 1 Tablet by mouth every 8 hours. 60 Tablet 1    levothyroxine (SYNTHROID) 125 MCG Tab Take 125 mcg by mouth every morning on an empty stomach.      ziprasidone (GEODON) 80 MG Cap Take 1 Capsule by mouth at bedtime. 30 Capsule 0    gabapentin (NEURONTIN) 300 MG Cap Take 300 mg by mouth at bedtime. Takes 300 mg in the morning and 600 mg at bedtime  Indications: Restless Leg Syndrome      ondansetron (ZOFRAN) 4 MG Tab tablet Take 1 Tablet by mouth every four hours as needed for Nausea/Vomiting. 20 Tablet 1    acetaminophen (TYLENOL) 500 MG Tab Take 2 Tablets by mouth every 6 hours as needed for Fever, Moderate Pain or Mild Pain. (Patient not taking: Reported on 6/14/2023) 60 Tablet 1    docusate sodium 100 MG Cap Take 1 capsule by mouth 2 times a day as needed for Constipation. (Patient not taking: Reported on 6/14/2023) 60 Capsule 1    ferrous sulfate 325 (65 Fe) MG tablet Take 1 Tablet by mouth 3 times a day with meals. (Patient not taking: Reported on 6/14/2023) 90 Tablet 3     No current Epic-ordered facility-administered medications on file.         ROS:  Gen: no  "fevers/chills, no changes in weight  Pulm: no sob, no cough  CV: no chest pain, no palpitations  GI: no nausea/vomiting, no diarrhea    Objective:     Exam:  BP (P) 116/70 (BP Location: Right arm, Patient Position: Sitting, BP Cuff Size: Adult)   Pulse (P) 72   Temp (P) 37.1 °C (98.7 °F) (Temporal)   Ht 1.6 m (5' 3\")   Wt 58.5 kg (129 lb)   LMP 09/01/2022 (Exact Date) Comment: two weeks postpartum  SpO2 (P) 98%   BMI 22.85 kg/m²  Body mass index is 22.85 kg/m².    Gen: Alert and oriented, No apparent distress.    Labs: reviewed     Assessment & Plan:     38 y.o. female with the following -     Problem List Items Addressed This Visit       Endometritis     Advised patient to continue to follow OB/GYN recommendations.  Encouraged her to continue taking Augmentin and doxycycline.  Encourage patient to take Zofran as needed for nausea and vomiting with the doxycycline.  Encourage patient to return to the emergency room for worsening fever, chills, bleeding.  She is agreeable with this plan.              No follow-ups on file.    Yeimi Cochran MD   PGY3          "

## 2023-06-23 NOTE — TELEPHONE ENCOUNTER
Pt called stating she is throwing up her antibiotics. She was throwing up before she started taking them so it is not from an allergic reaction. She says that her pain and bleeding have continued and have possibly gotten worse.  stated in her note that if this happens then she needs to go to the ER. I told patient to go to the ER and she said she didn't want to. After talking to her for a while she said she would try and go.

## 2023-06-26 ENCOUNTER — APPOINTMENT (OUTPATIENT)
Dept: BEHAVIORAL HEALTH | Facility: CLINIC | Age: 38
End: 2023-06-26
Payer: MEDICARE

## 2023-06-26 ENCOUNTER — TELEPHONE (OUTPATIENT)
Dept: OBGYN | Facility: CLINIC | Age: 38
End: 2023-06-26
Payer: MEDICARE

## 2023-06-26 ENCOUNTER — APPOINTMENT (OUTPATIENT)
Dept: RADIOLOGY | Facility: MEDICAL CENTER | Age: 38
End: 2023-06-26
Attending: EMERGENCY MEDICINE
Payer: MEDICARE

## 2023-06-26 ENCOUNTER — HOSPITAL ENCOUNTER (EMERGENCY)
Facility: MEDICAL CENTER | Age: 38
End: 2023-06-26
Attending: EMERGENCY MEDICINE
Payer: MEDICARE

## 2023-06-26 VITALS
SYSTOLIC BLOOD PRESSURE: 146 MMHG | HEIGHT: 63 IN | RESPIRATION RATE: 16 BRPM | BODY MASS INDEX: 23.28 KG/M2 | DIASTOLIC BLOOD PRESSURE: 65 MMHG | TEMPERATURE: 98.3 F | WEIGHT: 131.39 LBS | HEART RATE: 66 BPM | OXYGEN SATURATION: 99 %

## 2023-06-26 DIAGNOSIS — S80.12XA CONTUSION OF LEFT LOWER LEG, INITIAL ENCOUNTER: ICD-10-CM

## 2023-06-26 DIAGNOSIS — M79.662 PAIN OF LEFT LOWER LEG: ICD-10-CM

## 2023-06-26 LAB
ANION GAP SERPL CALC-SCNC: 10 MMOL/L (ref 7–16)
APTT PPP: 29.5 SEC (ref 24.7–36)
BASOPHILS # BLD AUTO: 0.4 % (ref 0–1.8)
BASOPHILS # BLD: 0.02 K/UL (ref 0–0.12)
BUN SERPL-MCNC: 11 MG/DL (ref 8–22)
CALCIUM SERPL-MCNC: 9.1 MG/DL (ref 8.5–10.5)
CHLORIDE SERPL-SCNC: 108 MMOL/L (ref 96–112)
CO2 SERPL-SCNC: 23 MMOL/L (ref 20–33)
CREAT SERPL-MCNC: 0.57 MG/DL (ref 0.5–1.4)
D DIMER PPP IA.FEU-MCNC: 2.65 UG/ML (FEU) (ref 0–0.5)
EOSINOPHIL # BLD AUTO: 0.04 K/UL (ref 0–0.51)
EOSINOPHIL NFR BLD: 0.7 % (ref 0–6.9)
ERYTHROCYTE [DISTWIDTH] IN BLOOD BY AUTOMATED COUNT: 47.7 FL (ref 35.9–50)
GFR SERPLBLD CREATININE-BSD FMLA CKD-EPI: 119 ML/MIN/1.73 M 2
GLUCOSE SERPL-MCNC: 92 MG/DL (ref 65–99)
HCT VFR BLD AUTO: 36.5 % (ref 37–47)
HGB BLD-MCNC: 11.5 G/DL (ref 12–16)
IMM GRANULOCYTES # BLD AUTO: 0.01 K/UL (ref 0–0.11)
IMM GRANULOCYTES NFR BLD AUTO: 0.2 % (ref 0–0.9)
INR PPP: 1.11 (ref 0.87–1.13)
LYMPHOCYTES # BLD AUTO: 2.03 K/UL (ref 1–4.8)
LYMPHOCYTES NFR BLD: 36.1 % (ref 22–41)
MCH RBC QN AUTO: 29.4 PG (ref 27–33)
MCHC RBC AUTO-ENTMCNC: 31.5 G/DL (ref 32.2–35.5)
MCV RBC AUTO: 93.4 FL (ref 81.4–97.8)
MONOCYTES # BLD AUTO: 0.5 K/UL (ref 0–0.85)
MONOCYTES NFR BLD AUTO: 8.9 % (ref 0–13.4)
NEUTROPHILS # BLD AUTO: 3.02 K/UL (ref 1.82–7.42)
NEUTROPHILS NFR BLD: 53.7 % (ref 44–72)
NRBC # BLD AUTO: 0 K/UL
NRBC BLD-RTO: 0 /100 WBC (ref 0–0.2)
PLATELET # BLD AUTO: 191 K/UL (ref 164–446)
PMV BLD AUTO: 10.2 FL (ref 9–12.9)
POTASSIUM SERPL-SCNC: 3.3 MMOL/L (ref 3.6–5.5)
PROTHROMBIN TIME: 14.1 SEC (ref 12–14.6)
RBC # BLD AUTO: 3.91 M/UL (ref 4.2–5.4)
SODIUM SERPL-SCNC: 141 MMOL/L (ref 135–145)
WBC # BLD AUTO: 5.6 K/UL (ref 4.8–10.8)

## 2023-06-26 PROCEDURE — 36415 COLL VENOUS BLD VENIPUNCTURE: CPT

## 2023-06-26 PROCEDURE — 80048 BASIC METABOLIC PNL TOTAL CA: CPT

## 2023-06-26 PROCEDURE — 85730 THROMBOPLASTIN TIME PARTIAL: CPT

## 2023-06-26 PROCEDURE — A9270 NON-COVERED ITEM OR SERVICE: HCPCS | Mod: UD | Performed by: EMERGENCY MEDICINE

## 2023-06-26 PROCEDURE — 99283 EMERGENCY DEPT VISIT LOW MDM: CPT

## 2023-06-26 PROCEDURE — 85379 FIBRIN DEGRADATION QUANT: CPT

## 2023-06-26 PROCEDURE — 85610 PROTHROMBIN TIME: CPT

## 2023-06-26 PROCEDURE — 700102 HCHG RX REV CODE 250 W/ 637 OVERRIDE(OP): Mod: UD | Performed by: EMERGENCY MEDICINE

## 2023-06-26 PROCEDURE — 93971 EXTREMITY STUDY: CPT | Mod: LT

## 2023-06-26 PROCEDURE — 85025 COMPLETE CBC W/AUTO DIFF WBC: CPT

## 2023-06-26 RX ORDER — IBUPROFEN 400 MG/1
800 TABLET ORAL ONCE
Status: COMPLETED | OUTPATIENT
Start: 2023-06-26 | End: 2023-06-26

## 2023-06-26 RX ORDER — ACETAMINOPHEN 500 MG
1000 TABLET ORAL ONCE
Status: COMPLETED | OUTPATIENT
Start: 2023-06-26 | End: 2023-06-26

## 2023-06-26 RX ADMIN — IBUPROFEN 800 MG: 400 TABLET, FILM COATED ORAL at 17:36

## 2023-06-26 RX ADMIN — ACETAMINOPHEN 1000 MG: 500 TABLET, FILM COATED ORAL at 17:36

## 2023-06-26 ASSESSMENT — FIBROSIS 4 INDEX: FIB4 SCORE: 0.45

## 2023-06-26 NOTE — ED TRIAGE NOTES
"Chief Complaint   Patient presents with    Leg Pain     Pt concerned about blood clot to her left leg.  Pt recently had a baby 4 weeks ago, requiring multiple transfusions.  Pt has bruising to left shin and c/o pain in the calf.  Pt denies trauma to that leg. Pt denies history of DVT. Pt states factor V runs in the family but the pt has not been officially diagnosed.      /82   Pulse 74   Temp 36.7 °C (98.1 °F) (Temporal)   Resp 14   Ht 1.6 m (5' 3\")   Wt 59.6 kg (131 lb 6.3 oz)   LMP 09/01/2022 (Exact Date) Comment: two weeks postpartum  SpO2 100%   BMI 23.28 kg/m²     Pt ambulatory from lobby with steady gait.  Pt denies shortness of breath or dizziness.     Pt is alert and oriented, speaking in full sentences, follows commands and responds appropriately to questions. Resp are even and unlabored.      Pt placed in lobby. Pt educated on triage process. Pt encouraged to alert staff for any changes.     Patient and staff wearing appropriate PPE    "

## 2023-06-26 NOTE — ED PROVIDER NOTES
ED Provider Note    CHIEF COMPLAINT  Chief Complaint   Patient presents with    Leg Pain     Pt concerned about blood clot to her left leg.  Pt recently had a baby 4 weeks ago, requiring multiple transfusions.  Pt has bruising to left shin and c/o pain in the calf.  Pt denies trauma to that leg. Pt denies history of DVT. Pt states factor V runs in the family but the pt has not been officially diagnosed.        EXTERNAL RECORDS REVIEWED  Inpatient Notes patient is postpartum about 4 weeks.  Since then she has been seen twice in our emergency department she was diagnosed with endometritis. .    HPI/ROS  LIMITATION TO HISTORY   Select: : None  OUTSIDE HISTORIAN(S):  Parent states there is a family history of factor V Leiden.    Kristen Luan is a 38 y.o. female who presents to the emerged department chief complaint of a very large bruise and pain to the left lower leg.  She states is mostly the anterior leg but a little bit radiates around to the calf area.  She states that she still having abdominal cramping and some passage of clots in the second of her endometritis but she is taking her antibiotics and the clots are improving and the pain is improving with Motrin at home.  Family history of factor V Leiden but a primary care so they have to wait until she is further out from her postpartum to evaluate if she has it or not.  Due to this bruising and the pain in her calf they just want to make sure she did not have a DVT although she has been active since her postpartum state.  She denies any chest pain or shortness of breath    PAST MEDICAL HISTORY   has a past medical history of Arrhythmia, Bipolar affective disorder (HCC), Breath shortness, Complete placenta previa nos or without hemorrhage, second trimester (01/26/2023), Connective tissue anomaly, Depression, Gastroparesis, Heart burn, Hypothyroid, Pain, Pregnant, and PTSD (post-traumatic stress disorder).    SURGICAL HISTORY   has a past surgical history  that includes knee arthroscopy (10/2009); dental extraction(s) (); knee arthroscopy (2010); synovectomy (2010); meniscectomy, knee, medial (2010); other ();  delivery only (N/A, 2020); repeat c section (N/A, 2023); and dilation and curettage (N/A, 2023).    FAMILY HISTORY  Family History   Problem Relation Age of Onset    Depression Mother     Heart Disease Mother     Bipolar disorder Maternal Aunt     Diabetes Other        SOCIAL HISTORY  Social History     Tobacco Use    Smoking status: Former     Packs/day: 0.50     Years: 1.50     Pack years: 0.75     Types: Cigarettes     Quit date: 2009     Years since quittin.8    Smokeless tobacco: Never   Vaping Use    Vaping Use: Never used   Substance and Sexual Activity    Alcohol use: No    Drug use: Not Currently     Types: Marijuana, Inhaled    Sexual activity: Yes     Partners: Male       CURRENT MEDICATIONS  Home Medications       Reviewed by Renetta Evangelista R.N. (Registered Nurse) on 23 at 1452  Med List Status: Not Addressed     Medication Last Dose Status   acetaminophen (TYLENOL) 500 MG Tab  Active   amoxicillin-clavulanate (AUGMENTIN) 875-125 MG Tab  Active   docusate sodium 100 MG Cap  Active   doxycycline (VIBRAMYCIN) 100 MG Cap  Active   ferrous sulfate 325 (65 Fe) MG tablet  Active   gabapentin (NEURONTIN) 300 MG Cap  Active   ibuprofen (MOTRIN) 800 MG Tab  Active   levothyroxine (SYNTHROID) 125 MCG Tab  Active   ondansetron (ZOFRAN ODT) 4 MG TABLET DISPERSIBLE  Active   ondansetron (ZOFRAN) 4 MG Tab tablet  Active   ziprasidone (GEODON) 80 MG Cap  Active                    ALLERGIES  Allergies   Allergen Reactions    Reglan [Metoclopramide]     Codeine Itching    Fentanyl Photosensitivity     Reports blurred vision     Hydrocodone Itching    Zoloft Swelling     Left hand swelling       PHYSICAL EXAM  VITAL SIGNS: BP (!) 140/77   Pulse 67   Temp 36.8 °C (98.3 °F) (Temporal)   Resp 18   Ht 1.6  "m (5' 3\")   Wt 59.6 kg (131 lb 6.3 oz)   LMP 09/01/2022 (Exact Date) Comment: two weeks postpartum  SpO2 98%   BMI 23.28 kg/m²    Pulse OX: Pulse Oxygen level is within normal limits  Constitutional: Alert in no apparent distress.  HENT: Normocephalic, Atraumatic, MMM  Eyes: PERound. Conjunctiva normal, non-icteric.   Heart: Regular rate and rhythm, intact distal pulses   Lungs: Symmetrical movement, no resp distress   Abdomen: Mild suprapubic tenderness without rebound or guarding, non-distended, normal bowel sounds  EXT/Back there is a large ecchymosis to the left lower anterior shin no calf swelling or tenderness good cap refill DP pulse 2+ full range of motion of the ankle and knee  Skin: Warm, Dry, No erythema, No rash.   Neurologic: Alert and oriented, Grossly non-focal.       DIAGNOSTIC STUDIES / PROCEDURES      LABS  Labs Reviewed   CBC WITH DIFFERENTIAL - Abnormal; Notable for the following components:       Result Value    RBC 3.91 (*)     Hemoglobin 11.5 (*)     Hematocrit 36.5 (*)     MCHC 31.5 (*)     All other components within normal limits    Narrative:     Indicate which anticoagulants the patient is on:->UNKNOWN   BASIC METABOLIC PANEL - Abnormal; Notable for the following components:    Potassium 3.3 (*)     All other components within normal limits    Narrative:     Indicate which anticoagulants the patient is on:->UNKNOWN   D-DIMER - Abnormal; Notable for the following components:    D-Dimer 2.65 (*)     All other components within normal limits    Narrative:     Indicate which anticoagulants the patient is on:->UNKNOWN   APTT    Narrative:     Indicate which anticoagulants the patient is on:->UNKNOWN   PROTHROMBIN TIME    Narrative:     Indicate which anticoagulants the patient is on:->UNKNOWN   ESTIMATED GFR    Narrative:     Indicate which anticoagulants the patient is on:->UNKNOWN         RADIOLOGY  I have independently interpreted the diagnostic imaging associated with this visit and am " waiting the final reading from the radiologist.   My preliminary interpretation is as follows:     DVT left lower extremity good compression flow in the femoral vein and popliteal vein    Radiologist interpretation:   US-EXTREMITY VENOUS LOWER UNILAT LEFT   Final Result            COURSE & MEDICAL DECISION MAKING    ED Observation Status? Yes; I am placing the patient in to an observation status due to a diagnostic uncertainty as well as therapeutic intensity. Patient placed in observation status at 4:34 PM, 6/26/2023.     Observation plan is as follows: labs including dimer    Upon Reevaluation, the patient's condition has: Improved; and will be discharged.    Patient discharged from ED Observation status at 7:27 PM  (Time) 06/26/23  (Date).     INITIAL ASSESSMENT, COURSE AND PLAN  Care Narrative patient presents emerged department chief complaint of left-sided calf pain family history of factor V Leiden recent postpartum.  She does not member the trauma which is why the large bruise is bothering her but it wraps a little bit around to the back there is no Swelling no pain with ambulation no whole leg swelling.  We discussed the plan for laboratory analysis primarily and then if she has an elevated D-dimer moving onto an ultrasound but I doubt a DVT at this time.  No signs of infection of the lower leg    6:07 PM  I reassessed patient at the bedside she is 4 weeks postpartum so is hoping maybe her dimer would have resolved at this time but I suspect this because her infection is still elevated but if she is complaining of left leg pain and has a family history of factor V Leiden we will perform an ultrasound at this time.    DISPOSITION AND DISCUSSIONS  7:27 PM  US normal  I reassessed the patient at the bedside.  She is feeling well her blood pressure is mildly elevated but she has followed up with OB/GYN and there is no other signs of preeclampsia at this time.  She will continue taking the antibiotics and  medications as prescribed she will return to the emerge department for any new or worsening issues she feels comfortable going home.      I have discussed management of the patient with the following physicians and WERO's:  none    Discussion of management with other [facility] or appropriate source(s): None     Escalation of care considered, and ultimately not performed:acute inpatient care management, however at this time, the patient is most appropriate for outpatient management    Barriers to care at this time, including but not limited to:  none .     Decision tools and prescription drugs considered including, but not limited to: Antibiotics not indicated nor are blood thinners .    The patient will return for new or worsening symptoms and is stable at the time of discharge.    The patient is referred to a primary physician for blood pressure management, diabetic screening, and for all other preventative health concerns.    DISPOSITION:  Patient will be discharged home in stable condition.    FOLLOW UP:  Yeimi Cochran M.D.  [address] [facility] [address]  [phone]    Schedule an appointment as soon as possible for a visit       [facility], Emergency Dept  [address] [place] [id number] [phone]    If symptoms worsen      OUTPATIENT MEDICATIONS:  Discharge Medication List as of 6/26/2023  7:43 PM            FINAL DIAGNOSIS  1. Pain of left lower leg    2. Contusion of left lower leg, initial encounter           Electronically signed by: Maki Perkins M.D., 6/26/2023 4:26 PM

## 2023-06-26 NOTE — TELEPHONE ENCOUNTER
Pt called stating she thinks she has a blood clot. She has had a bruise over the last few days that have been getting worse and now her whole leg is in pain. I recommended her to go to the ER per Dr. Crawford. She said she understood.

## 2023-06-27 ENCOUNTER — GYNECOLOGY VISIT (OUTPATIENT)
Dept: OBGYN | Facility: CLINIC | Age: 38
End: 2023-06-27
Payer: MEDICARE

## 2023-06-27 DIAGNOSIS — R03.0 ELEVATED BLOOD PRESSURE READING IN OFFICE WITHOUT DIAGNOSIS OF HYPERTENSION: ICD-10-CM

## 2023-06-27 DIAGNOSIS — N71.9 ENDOMETRITIS: Primary | ICD-10-CM

## 2023-06-27 PROCEDURE — 3079F DIAST BP 80-89 MM HG: CPT | Performed by: OBSTETRICS & GYNECOLOGY

## 2023-06-27 PROCEDURE — 99213 OFFICE O/P EST LOW 20 MIN: CPT | Performed by: OBSTETRICS & GYNECOLOGY

## 2023-06-27 PROCEDURE — 3077F SYST BP >= 140 MM HG: CPT | Performed by: OBSTETRICS & GYNECOLOGY

## 2023-06-27 ASSESSMENT — FIBROSIS 4 INDEX: FIB4 SCORE: 0.63

## 2023-06-27 NOTE — PROGRESS NOTES
Subjective     Kristen Luna is a 38 y.o. female who presents with Gynecologic Exam (Follow up )    CC: ER follow up    HPI: Kristen is a 39yo  who presents for an ER follow up after presenting with concern about possible DVT. Imaging was negative and she was discharged in stable condition. She reports that she is still having pelvic cramping and she passed some clots two days ago. She reports low grade fevers at home of 99.2 degrees Fahrenheit. She has one additional dose of antibiotic left. She felt nauseated with the antibiotics, but she was able to keep them down for the most part. Temperature is normal in the office today at 98 degrees farenheit. Her blood pressure is elevated in the office today. She is angry about her post partum complications and she is very emotional today. She denies headache, vision changes, or RUQ pain. She has a history of severe post partum depression with her last pregnancy. She feels depressed now. No SI/HI. She has an appointment with both her psychiatrist and her therapist tomorrow. She plans to discuss her mood and history of postpartum depression.                  Objective     BP (!) 140/81 (BP Location: Right arm, Patient Position: Sitting, BP Cuff Size: Adult)   Wt 130 lb 11.2 oz   LMP 2022 (Exact Date) Comment: two weeks postpartum  BMI 23.15 kg/m²      Physical Exam  Constitutional:       Appearance: Normal appearance.   HENT:      Head: Normocephalic and atraumatic.      Mouth/Throat:      Mouth: Mucous membranes are moist.   Eyes:      Extraocular Movements: Extraocular movements intact.   Pulmonary:      Effort: Pulmonary effort is normal. No respiratory distress.   Skin:     Findings: Bruising (on left lower leg) present.   Neurological:      General: No focal deficit present.      Mental Status: She is alert.                             Assessment & Plan     Kristen was seen today for gynecologic exam.    Diagnoses and all orders for this  visit:    Endometritis  - seems to be recovering from endometritis, has one additional dose of oral antibiotics left    Elevated blood pressure reading in office without diagnosis of hypertension  - discussed taking blood pressure at home and keeping a log  - most likely elevated in the office because she is very emotional today  - return for blood pressure check in office in 1 week  - warning signs to go to the ER for headache, vision changes, RUQ pain, or blood pressure >160/110    Postpartum depression  - Patient is very emotional in the office and tearful. She has a history of severe post partum depression with her last delivery. She has an appointment with both psychiatry and her therapist tomorrow and she plans to talk with them about everything.    Return: For blood pressure check in the office in 1 week. Return for 2 week postpartum follow up with physician.    Renetta Baig M.D.

## 2023-06-27 NOTE — ED NOTES
Discharge instructions provided to pt.  ERP went over discharge instructions with pt.  Pt instructed to follow up with primary care provider.  Pt verbalized understanding.  Instructed to return to Emergency Department for new or worsening symptoms.

## 2023-06-28 ENCOUNTER — TELEMEDICINE (OUTPATIENT)
Dept: BEHAVIORAL HEALTH | Facility: CLINIC | Age: 38
End: 2023-06-28
Payer: MEDICARE

## 2023-06-28 VITALS
TEMPERATURE: 98 F | WEIGHT: 130.7 LBS | SYSTOLIC BLOOD PRESSURE: 151 MMHG | BODY MASS INDEX: 23.15 KG/M2 | DIASTOLIC BLOOD PRESSURE: 80 MMHG

## 2023-06-28 DIAGNOSIS — F63.81 INTERMITTENT EXPLOSIVE DISORDER: ICD-10-CM

## 2023-06-28 DIAGNOSIS — F33.0 MDD (MAJOR DEPRESSIVE DISORDER), RECURRENT EPISODE, MILD (HCC): ICD-10-CM

## 2023-06-28 PROBLEM — Z3A.39 39 WEEKS GESTATION OF PREGNANCY: Status: RESOLVED | Noted: 2023-06-06 | Resolved: 2023-06-28

## 2023-06-28 PROBLEM — O09.523 MULTIGRAVIDA OF ADVANCED MATERNAL AGE IN THIRD TRIMESTER: Status: RESOLVED | Noted: 2022-12-27 | Resolved: 2023-06-28

## 2023-06-28 PROBLEM — Z3A.27 27 WEEKS GESTATION OF PREGNANCY: Status: RESOLVED | Noted: 2022-11-11 | Resolved: 2023-06-28

## 2023-06-28 PROBLEM — O09.93 HIGH-RISK PREGNANCY SUPERVISION, THIRD TRIMESTER: Status: RESOLVED | Noted: 2022-12-27 | Resolved: 2023-06-28

## 2023-06-28 PROCEDURE — 99214 OFFICE O/P EST MOD 30 MIN: CPT | Mod: 95 | Performed by: PSYCHIATRY & NEUROLOGY

## 2023-06-28 PROCEDURE — 90833 PSYTX W PT W E/M 30 MIN: CPT | Mod: 95 | Performed by: PSYCHIATRY & NEUROLOGY

## 2023-06-28 RX ORDER — ZIPRASIDONE HYDROCHLORIDE 80 MG/1
80 CAPSULE ORAL
Qty: 30 CAPSULE | Refills: 1 | Status: SHIPPED | OUTPATIENT
Start: 2023-06-28 | End: 2023-07-18 | Stop reason: SDUPTHER

## 2023-06-28 RX ORDER — GABAPENTIN 300 MG/1
300 CAPSULE ORAL
Qty: 30 CAPSULE | Refills: 1
Start: 2023-06-28 | End: 2023-08-29 | Stop reason: SDUPTHER

## 2023-06-28 NOTE — PROGRESS NOTES
PSYCHIATRY VIRTUAL VISIT FOLLOW-UP NOTE    Chief Complaint   Patient presents with    Follow-Up     mood     This evaluation was conducted via Zoom using secure and encrypted videoconferencing technology.   The patient was in their home in the [facility].    The patient's identity was confirmed and verbal consent was obtained for this virtual visit.    History Of Present Illness:  Kristen Luna is a 38 y.o. female with major depressive disorder, intermittent explosive disorder, Sandra-Danlos Syndrome type III, gastroparesis, hypothyroidism comes in today for follow up, was last seen over 3 months ago.  Since her last appointment with me she had a  and delivered healthy baby boy.  She has had some postoperative complications and has been to the ER few times since then.  Her brother and sister-in-law adopted the baby at birth.  She mentions that since her last appointment she tried to get back with her ex but it did not work out so she moved out again.  She thinks that this was the best decision for everyone.  She is noticing some sadness when it comes to having her baby adopted but she is trying to focus on herself and her 2-year-old daughter.  She is living with her mother and stepfather and they have been supportive.  She is trying to feel better physically at this time.  She has been compliant with both Geodon and Neurontin and she has started seeing a therapist again.  She has been doing good in regards to sleep but has noticed some struggles with appetite.  She denies having thoughts of wanting to hurt herself.    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    Substance Use:  Alcohol - Denies   Nicotine  - Denies  Cannabis - Denies   Illicit drugs - Denies     Past Medication Trials:  Zoloft (drug allergy), Depakote (ineffective, s/e - anxiety), Lamictal, Zyprexa (effective, s/e - weight gain), Seroquel (ineffective), Trazodone 100 mg (effective, discontinued during pregnancy).  She has tried several other medications but is unable to recall them.    Medications:  Current Outpatient Medications   Medication Sig Dispense Refill    ondansetron (ZOFRAN ODT) 4 MG TABLET DISPERSIBLE Take 4 mg by mouth every four hours as needed for Nausea. 20 Tablet 1    ondansetron (ZOFRAN) 4 MG Tab tablet Take 1 Tablet by mouth every four hours as needed for Nausea/Vomiting. 20 Tablet 1    acetaminophen (TYLENOL) 500 MG Tab Take 2 Tablets by mouth every 6 hours as needed for Fever, Moderate Pain or Mild Pain. 60 Tablet 1    docusate sodium 100 MG Cap Take 1 capsule by mouth 2 times a day as needed for Constipation. 60 Capsule 1    ferrous sulfate 325 (65 Fe) MG tablet Take 1 Tablet by mouth 3 times a day with meals. 90 Tablet 3    ibuprofen (MOTRIN) 800 MG Tab Take 1 Tablet by mouth every 8 hours. 60 Tablet 1    levothyroxine (SYNTHROID) 125 MCG Tab Take 125 mcg by mouth every morning on an empty stomach.      ziprasidone (GEODON) 80 MG Cap Take 1 Capsule by mouth at bedtime. 30 Capsule 0    gabapentin (NEURONTIN) 300 MG Cap Take 300 mg by mouth at bedtime. Takes 300 mg in the morning and 600 mg at bedtime  Indications: Restless Leg Syndrome       No current facility-administered medications for this visit.     Review Of Systems:    Constitutional - Positive for fatigue  Psychiatric - Positive for depression     Physical Examination:  Vital signs: LMP 09/01/2022 (Exact Date) Comment: two weeks postpartum    Musculoskeletal: No abnormal movements.     Mental Status Evaluation:   General: Young female, tearful, dressed in casual attire, good grooming and hygiene, in no apparent distress, calm and cooperative, good eye contact, no  "psychomotor agitation or retardation  Orientation: Alert and oriented to person, place and time  Recent and remote memory: Grossly intact  Attention span and concentration: Grossly intact  Speech: Spontaneous, normal rate, rhythm and tone  Thought Process: Linear, logical and goal directed  Thought Content: Denies suicidal or homicidal ideations, intent or plan  Perception: No delusions noted  Associations: Intact  Language: Appropriate  Fund of knowledge and vocabulary: Grossly adequate  Mood: \"it has been stressful\"  Affect: Dysphoric at times, mood congruent  Insight: Good  Judgment: Good    Depression screenin/2/2023    11:39 AM 2023     7:30 AM 2023     7:15 PM   Depression Screen (PHQ-2/PHQ-9)   PHQ-2 Total Score 0 0 0     Interpretation of PHQ-9 Total Score   Score Severity   1-4 No Depression   5-9 Mild Depression   10-14 Moderate Depression   15-19 Moderately Severe Depression   20-27 Severe Depression    Anxiety screenin/21/2022     3:05 PM   PARISH 7   PARISH-7 Total Score 3     Interpretation of PARISH 7 Total Score   Score Severity:  0-4 No Anxiety   5-9 Mild Anxiety  10-14 Moderate Anxiety  15-21 Severe Anxiety    Medical Records/Labs/Diagnostic Tests Reviewed:  [place] PDMP records - one opioid medication prescription in the last 2 years      Impression:  1.  Major depressive disorder, recurrent, mild - stable    2.  Intermittent explosive disorder - stable  3.  2nd trimester pregnancy, 27 weeks (OLIVERIO 2023) - resolved   4.  History of bipolar mood disorder and post partum depression    Plan:  1.  Continue Geodon 80 mg at bedtime for mood stabilization  -Metabolic monitoring: A1c, lipid profile ordered previously, still pending  2.  She is not interested in retrial of SSRI/SNRI medication  3.  Continue Neurontin 300 mg at bedtime for mood stabilization and RLS  4.  Continue weekly individual psychotherapy with Kristen Dyer at [facility]  5.  Provided supportive psychotherapy (> 16 " minutes): Feelings related to baby's adoption, encouraged to focus on self-care, appetite and hygiene while she is recovering from  and that should help with postpartum depression as well.    Return to clinic in 6 weeks or sooner if symptoms worsen    The proposed treatment plan was discussed with the patient who was provided the opportunity to ask questions and make suggestions regarding alternative treatment. Patient verbalized understanding and expressed agreement with the plan.     Socorro Nance M.D.  23    This note was created using voice recognition software (Dragon). The accuracy of the dictation is limited by the abilities of the software. I have reviewed the note prior to signing, however some errors in grammar and context are still possible. If you have any questions related to this note please do not hesitate to contact our office.

## 2023-07-05 ENCOUNTER — APPOINTMENT (OUTPATIENT)
Dept: OBGYN | Facility: CLINIC | Age: 38
End: 2023-07-05
Payer: MEDICARE

## 2023-07-10 ENCOUNTER — GYNECOLOGY VISIT (OUTPATIENT)
Dept: OBGYN | Facility: CLINIC | Age: 38
End: 2023-07-10
Payer: MEDICARE

## 2023-07-10 VITALS — BODY MASS INDEX: 22.96 KG/M2 | WEIGHT: 129.6 LBS | SYSTOLIC BLOOD PRESSURE: 122 MMHG | DIASTOLIC BLOOD PRESSURE: 74 MMHG

## 2023-07-10 DIAGNOSIS — F33.0 MDD (MAJOR DEPRESSIVE DISORDER), RECURRENT EPISODE, MILD (HCC): ICD-10-CM

## 2023-07-10 PROCEDURE — 0503F POSTPARTUM CARE VISIT: CPT | Performed by: OBSTETRICS & GYNECOLOGY

## 2023-07-10 PROCEDURE — 3074F SYST BP LT 130 MM HG: CPT | Performed by: OBSTETRICS & GYNECOLOGY

## 2023-07-10 PROCEDURE — 3078F DIAST BP <80 MM HG: CPT | Performed by: OBSTETRICS & GYNECOLOGY

## 2023-07-10 ASSESSMENT — FIBROSIS 4 INDEX: FIB4 SCORE: 0.63

## 2023-07-10 NOTE — PROGRESS NOTES
Post Op follow up    Cc: follow up after repeat c/s and suspected pp endometritis    HPI: 38 y.o.  here for f/u. She had a repeat c/s on . This was complicated by pp hemorrhage treated with post op D&C and Bakri balloon. She received blood transfusion and stabilized and was discharged on pod 4. She was seen here twice by me since then and was doing well. She had been seen for rule out DVT. She then returned and was seen by Dr Baig for low grade fevers and pain and treated for suspected pp endometritis with oral antibiotics. She now states she has no bleeding and the cramping has completely resolved. She denies any further fevers. She also had elevated blood pressure at last follow up so was asked to return today for BP check. She denies any S&S of PIH.   She has a significant psychiatric history and states she feels she is developing postpartum depression in addition. She saw her therapist last week and has another appt in 2 days. She has spoken to her psychiatrist. She is currently on geodon and gabapentin. She denies suicidal or homicidal ideation and denies any thoughts of harm to herself or others. She is taking care of her daughter. She states she will call her psychiatrist today to discuss possibly adding or changing meds for this increased depression she is feeling pp. I encouraged her to do so. We discussed that the hormone affect on mood pp can be significant and is real and should not be overlooked. She agrees to seek further help from her psychiatrist.   Physically she is feeling well nd believes she is finally recovering.       ROS:  Gen: denies fevers, general concerns  Abd: denies abdominal pain  Gu: denies vaginal bleeding, discharge, pain    Past Medical History:   Diagnosis Date    Arrhythmia     SVT no episodes since     Bipolar affective disorder (HCC)     Breath shortness     at noc [id number]; primary MD aware    Complete placenta previa nos or without hemorrhage, second trimester  2023    Complete previa on 20 week US with [facility]- has repeat scheduled    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    Depression     Gastroparesis     Heart burn     Hypothyroid     Pain     Left knee 5/10    Pregnant     PTSD (post-traumatic stress disorder)        /74 (BP Location: Right arm, Patient Position: Sitting, BP Cuff Size: Adult)   Wt 129 lb 9.6 oz   LMP 2022 (Exact Date) Comment: two weeks postpartum  BMI 22.96 kg/m²   Gen; AAO, NAD  Abdomen: soft, ND, NT, incision well healed, c/d/i  Gu:  deferred    A/P: 38 y.o.  s/p repeat c/s (baby adopted out to her brother)  Bipolar disorder- will call psychiatrist for follow up today, has therapist appt in 2 days  Contraception- pt is unsure at this time what she would like to do for contraception and plans to stay abstinent for some time. She will return in one month to discuss plans for contraception/possible sterilization      F/U: 1 month

## 2023-07-11 ENCOUNTER — TELEMEDICINE (OUTPATIENT)
Dept: BEHAVIORAL HEALTH | Facility: CLINIC | Age: 38
End: 2023-07-11
Payer: MEDICARE

## 2023-07-11 DIAGNOSIS — F63.81 INTERMITTENT EXPLOSIVE DISORDER: ICD-10-CM

## 2023-07-11 DIAGNOSIS — F33.1 MDD (MAJOR DEPRESSIVE DISORDER), RECURRENT EPISODE, MODERATE (HCC): ICD-10-CM

## 2023-07-11 PROCEDURE — 99214 OFFICE O/P EST MOD 30 MIN: CPT | Mod: 95 | Performed by: PSYCHIATRY & NEUROLOGY

## 2023-07-11 RX ORDER — FLUOXETINE HYDROCHLORIDE 20 MG/1
20 CAPSULE ORAL DAILY
Qty: 30 CAPSULE | Refills: 0 | Status: SHIPPED | OUTPATIENT
Start: 2023-07-11 | End: 2023-07-18 | Stop reason: SDUPTHER

## 2023-07-11 NOTE — PROGRESS NOTES
PSYCHIATRY VIRTUAL VISIT FOLLOW-UP NOTE    Chief Complaint   Patient presents with    Depression     worsening     This evaluation was conducted via Zoom using secure and encrypted videoconferencing technology.   The [facility].    The patient's identity was confirmed and verbal consent was obtained for this virtual visit.    History Of Present Illness:  Kristen Luna is a 38 y.o. female with major depressive disorder, intermittent explosive disorder, Sandra-Danlos Syndrome type III, gastroparesis, hypothyroidism comes in today for follow up, was last seen 2 weeks ago.  She has lately been noticing increased troubles with depression, lack of energy, appetite and sleep changes.  She denies any new psychosocial stressors that are contributing to her depression.  She has been compliant with her Geodon and Neurontin and is seeing a therapist on a weekly basis.  She has not had any contact with the father of her kids.  She does have good support from her mother and is talking to her regularly.  She has noticed that she is struggling with personal hygiene as well.  She did struggle with postpartum depression after her first pregnancy and it was a lot worse compared to this time.  She denies having active thoughts, intent or plan of wanting to hurt herself.    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    Substance Use:  Alcohol - Denies   Nicotine - Denies  Cannabis - Denies   Illicit drugs - Denies     Past Medication Trials:  Zoloft (drug allergy), Depakote (ineffective, s/e - anxiety), Lamictal, Zyprexa (effective, s/e -  "weight gain), Seroquel (ineffective), Trazodone 100 mg (effective, discontinued during pregnancy).  She has tried several other medications but is unable to recall them.    Medications:  Current Outpatient Medications   Medication Sig Dispense Refill    FLUoxetine (PROZAC) 20 MG Cap Take 1 Capsule by mouth every day. 30 Capsule 0    ziprasidone (GEODON) 80 MG Cap Take 1 Capsule by mouth at bedtime. 30 Capsule 1    gabapentin (NEURONTIN) 300 MG Cap Take 1 Capsule by mouth at bedtime. 30 Capsule 1    ondansetron (ZOFRAN ODT) 4 MG TABLET DISPERSIBLE Take 4 mg by mouth every four hours as needed for Nausea. 20 Tablet 1    ondansetron (ZOFRAN) 4 MG Tab tablet Take 1 Tablet by mouth every four hours as needed for Nausea/Vomiting. 20 Tablet 1    ferrous sulfate 325 (65 Fe) MG tablet Take 1 Tablet by mouth 3 times a day with meals. 90 Tablet 3    levothyroxine (SYNTHROID) 125 MCG Tab Take 125 mcg by mouth every morning on an empty stomach.       No current facility-administered medications for this visit.     Review Of Systems:    Constitutional - Positive for fatigue  Psychiatric - Positive for depression     Physical Examination:  Vital signs: LMP 09/01/2022 (Exact Date) Comment: two weeks postpartum    Musculoskeletal: No abnormal movements.     Mental Status Evaluation:   General: Young female, tearful, dressed in casual attire, good grooming and hygiene, in no apparent distress, calm and cooperative, good eye contact, no psychomotor agitation or retardation  Orientation: Alert and oriented to person, place and time  Recent and remote memory: Grossly intact  Attention span and concentration: Grossly intact  Speech: Spontaneous, normal rate, rhythm and tone  Thought Process: Linear, logical and goal directed  Thought Content: Denies suicidal or homicidal ideations, intent or plan  Perception: No delusions noted  Associations: Intact  Language: Appropriate  Fund of knowledge and vocabulary: Grossly adequate  Mood: \"not " "good\"  Affect: Dysphoric at times, mood congruent  Insight: Good  Judgment: Good    Depression screenin/2/2023    11:39 AM 2023     7:30 AM 2023     7:15 PM   Depression Screen (PHQ-2/PHQ-9)   PHQ-2 Total Score 0 0 0     Interpretation of PHQ-9 Total Score   Score Severity   1-4 No Depression   5-9 Mild Depression   10-14 Moderate Depression   15-19 Moderately Severe Depression   20-27 Severe Depression    Anxiety screenin/21/2022     3:05 PM   PARISH 7   PARISH-7 Total Score 3     Interpretation of PARISH 7 Total Score   Score Severity:  0-4 No Anxiety   5-9 Mild Anxiety  10-14 Moderate Anxiety  15-21 Severe Anxiety    Medical Records/Labs/Diagnostic Tests Reviewed:  [place] PDMP records - one opioid medication prescription in the last 2 years      Impression:  1.  Major depressive disorder, recurrent, moderate with post partum onset - worsening     2.  Intermittent explosive disorder - stable  3.  History of bipolar mood disorder and post partum depression    Plan:  1.  Start Prozac 20 mg daily for depression.  Discussed 4-6 week period to assess for efficacy. Discussed side effects including nausea/vomiting, abdominal discomfort/pain, diarrhea, headaches, drowsiness, sleep disturbances, initial transient worsening of anxiety, agitation, fatigue, excessive sweating, dry mouth, sexual dysfunction etc.  2.  Continue Geodon 80 mg at bedtime for mood stabilization  -Metabolic monitoring: A1c, lipid profile ordered previously, still pending  3.  Continue Neurontin 300 mg at bedtime for mood stabilization and RLS  4.  Continue weekly individual psychotherapy with Kristen Dyer at [facility]  5.  Discussed safety plan including talking to mother, call [phone] or [phone]    Return to clinic in 1 week or sooner if symptoms worsen    The proposed treatment plan was discussed with the patient who was provided the opportunity to ask questions and make suggestions regarding alternative treatment. Patient verbalized " understanding and expressed agreement with the plan.     Socorro Nance M.D.  07/11/23    This note was created using voice recognition software (Dragon). The accuracy of the dictation is limited by the abilities of the software. I have reviewed the note prior to signing, however some errors in grammar and context are still possible. If you have any questions related to this note please do not hesitate to contact our office.

## 2023-07-13 ENCOUNTER — TELEPHONE (OUTPATIENT)
Dept: OBGYN | Facility: CLINIC | Age: 38
End: 2023-07-13

## 2023-07-13 NOTE — TELEPHONE ENCOUNTER
Pt called concerned that she is passing blood clots that started today pt also concerned since she received blood transfusion and believes it is connected. Informed pt that after delivery there can be a significant amount of bleeding as well it can be her period. Pt states would like to discuss with provider informed pt that it would be best to schedule an appt     Pt understood and would like message to send to Dr. Baig for her to be aware   And transferred to [facility]

## 2023-07-18 ENCOUNTER — TELEMEDICINE (OUTPATIENT)
Dept: BEHAVIORAL HEALTH | Facility: CLINIC | Age: 38
End: 2023-07-18
Payer: MEDICARE

## 2023-07-18 DIAGNOSIS — F63.81 INTERMITTENT EXPLOSIVE DISORDER: ICD-10-CM

## 2023-07-18 DIAGNOSIS — F33.1 MDD (MAJOR DEPRESSIVE DISORDER), RECURRENT EPISODE, MODERATE (HCC): ICD-10-CM

## 2023-07-18 PROCEDURE — 99214 OFFICE O/P EST MOD 30 MIN: CPT | Mod: 95 | Performed by: PSYCHIATRY & NEUROLOGY

## 2023-07-18 RX ORDER — ZIPRASIDONE HYDROCHLORIDE 80 MG/1
80 CAPSULE ORAL
Qty: 30 CAPSULE | Refills: 0 | Status: SHIPPED | OUTPATIENT
Start: 2023-07-18 | End: 2023-07-26 | Stop reason: SDUPTHER

## 2023-07-18 RX ORDER — FLUOXETINE HYDROCHLORIDE 20 MG/1
20 CAPSULE ORAL DAILY
Qty: 30 CAPSULE | Refills: 0 | Status: SHIPPED | OUTPATIENT
Start: 2023-07-18 | End: 2023-07-26 | Stop reason: SINTOL

## 2023-07-18 RX ORDER — ERGOCALCIFEROL 1.25 MG/1
50000 CAPSULE ORAL
COMMUNITY
Start: 2023-07-13 | End: 2023-12-14

## 2023-07-18 NOTE — PROGRESS NOTES
PSYCHIATRY VIRTUAL VISIT FOLLOW-UP NOTE    Chief Complaint   Patient presents with    Follow-Up     depression     This evaluation was conducted via Zoom using secure and encrypted videoconferencing technology.   The patient was in a private location outside of their home in the [facility].    The patient's identity was confirmed and verbal consent was obtained for this virtual visit.    History Of Present Illness:  Kristen Luna is a 38 y.o. female with major depressive disorder, intermittent explosive disorder, Sandra-Danlos Syndrome type III, gastroparesis, hypothyroidism comes in today for follow up, was last seen 1 week ago.  She has been feeling better in regards to her depression since she started taking Prozac a week ago.  Her affect appeared a lot better compared to her last week appointment.  She is feeling more hopeful and happy compared to before.  She mentions that her ex has been trying to contact her but she has been avoiding him.  She has not noticed any side effects from Prozac so far.  She is sleeping better and is adequately taking care of herself.  She has been enjoying spending time with her 2-year-old daughter.  She denies having thoughts of wanting to hurt herself.    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    Substance Use:  Alcohol - Denies   Nicotine - Denies  Cannabis - Denies   Illicit drugs - Denies     Past Medication Trials:  Zoloft (drug allergy), Depakote (ineffective, s/e - anxiety), Lamictal, Zyprexa (effective, s/e - weight gain), Seroquel (ineffective), Trazodone 100 mg (effective, discontinued during pregnancy).   "She has tried several other medications but is unable to recall them.    Medications:  Current Outpatient Medications   Medication Sig Dispense Refill    FLUoxetine (PROZAC) 20 MG Cap Take 1 Capsule by mouth every day. 30 Capsule 0    ziprasidone (GEODON) 80 MG Cap Take 1 Capsule by mouth at bedtime. 30 Capsule 0    gabapentin (NEURONTIN) 300 MG Cap Take 1 Capsule by mouth at bedtime. 30 Capsule 1    ondansetron (ZOFRAN ODT) 4 MG TABLET DISPERSIBLE Take 4 mg by mouth every four hours as needed for Nausea. 20 Tablet 1    ondansetron (ZOFRAN) 4 MG Tab tablet Take 1 Tablet by mouth every four hours as needed for Nausea/Vomiting. 20 Tablet 1    levothyroxine (SYNTHROID) 125 MCG Tab Take 125 mcg by mouth every morning on an empty stomach.      vitamin D2, Ergocalciferol, (DRISDOL) 1.25 MG (19045 UT) Cap capsule Take 50,000 Units by mouth every 7 days.       No current facility-administered medications for this visit.     Review Of Systems:    Constitutional - Positive for fatigue  Psychiatric - Positive for depression     Physical Examination:  Vital signs: LMP 2022 (Exact Date)     Musculoskeletal: No abnormal movements.     Mental Status Evaluation:   General: Young female, tearful, dressed in casual attire, good grooming and hygiene, in no apparent distress, calm and cooperative, good eye contact, no psychomotor agitation or retardation  Orientation: Alert and oriented to person, place and time  Recent and remote memory: Grossly intact  Attention span and concentration: Grossly intact  Speech: Spontaneous, normal rate, rhythm and tone  Thought Process: Linear, logical and goal directed  Thought Content: Denies suicidal or homicidal ideations, intent or plan  Perception: No delusions noted  Associations: Intact  Language: Appropriate  Fund of knowledge and vocabulary: Grossly adequate  Mood: \"so much better\"  Affect: Euthymic mood congruent  Insight: Good  Judgment: Good    Depression screenin/2/2023    " 11:39 AM 2023     7:30 AM 2023     7:15 PM   Depression Screen (PHQ-2/PHQ-9)   PHQ-2 Total Score 0 0 0     Interpretation of PHQ-9 Total Score   Score Severity   1-4 No Depression   5-9 Mild Depression   10-14 Moderate Depression   15-19 Moderately Severe Depression   20-27 Severe Depression    Anxiety screenin/21/2022     3:05 PM   PARISH 7   PARISH-7 Total Score 3     Interpretation of PARISH 7 Total Score   Score Severity:  0-4 No Anxiety   5-9 Mild Anxiety  10-14 Moderate Anxiety  15-21 Severe Anxiety    Medical Records/Labs/Diagnostic Tests Reviewed:  [place] PDMP records - one opioid medication prescription in the last 2 years      Impression:  1.  Major depressive disorder, recurrent, moderate with post partum onset - improving      2.  Intermittent explosive disorder - stable  3.  History of bipolar mood disorder and post partum depression    Plan:  1.  Continue Prozac 20 mg daily for depression  2.  Continue Geodon 80 mg at bedtime for mood stabilization  -Metabolic monitoring: A1c, lipid profile ordered previously, still pending  3.  Continue Neurontin 300 mg at bedtime for mood stabilization and RLS  4.  Continue weekly individual psychotherapy with Kristen Dyer at [facility]    Return to clinic in 6 weeks or sooner if symptoms worsen    The proposed treatment plan was discussed with the patient who was provided the opportunity to ask questions and make suggestions regarding alternative treatment. Patient verbalized understanding and expressed agreement with the plan.     Socorro Nance M.D.  23    This note was created using voice recognition software (Dragon). The accuracy of the dictation is limited by the abilities of the software. I have reviewed the note prior to signing, however some errors in grammar and context are still possible. If you have any questions related to this note please do not hesitate to contact our office.

## 2023-07-26 ENCOUNTER — TELEPHONE (OUTPATIENT)
Dept: BEHAVIORAL HEALTH | Facility: CLINIC | Age: 38
End: 2023-07-26

## 2023-07-26 ENCOUNTER — TELEMEDICINE (OUTPATIENT)
Dept: BEHAVIORAL HEALTH | Facility: CLINIC | Age: 38
End: 2023-07-26
Payer: MEDICARE

## 2023-07-26 DIAGNOSIS — F33.1 MDD (MAJOR DEPRESSIVE DISORDER), RECURRENT EPISODE, MODERATE (HCC): ICD-10-CM

## 2023-07-26 DIAGNOSIS — F63.81 INTERMITTENT EXPLOSIVE DISORDER: ICD-10-CM

## 2023-07-26 PROCEDURE — 99214 OFFICE O/P EST MOD 30 MIN: CPT | Mod: 95 | Performed by: PSYCHIATRY & NEUROLOGY

## 2023-07-26 RX ORDER — ESCITALOPRAM OXALATE 5 MG/1
5 TABLET ORAL
Qty: 30 TABLET | Refills: 1 | Status: SHIPPED | OUTPATIENT
Start: 2023-07-26 | End: 2023-07-26

## 2023-07-26 RX ORDER — ZIPRASIDONE HYDROCHLORIDE 80 MG/1
80 CAPSULE ORAL
Qty: 30 CAPSULE | Refills: 0 | Status: SHIPPED | OUTPATIENT
Start: 2023-07-26 | End: 2023-08-29

## 2023-07-26 NOTE — TELEPHONE ENCOUNTER
----- Message from Socorro Nance M.D. sent at 7/26/2023 11:24 AM PDT -----  Yes, I'm aware. Okay to continue both.    ----- Message -----  From: Sarah Hernández  Sent: 7/26/2023  11:13 AM PDT  To: Socorro Nance M.D.    Pharmacy called to ask if you were aware of the risk of the escitalopram and ziprasidone? Are you okay for her to continue with both or stop one of them and continue with the other one?

## 2023-07-26 NOTE — PROGRESS NOTES
PSYCHIATRY VIRTUAL VISIT FOLLOW-UP NOTE    Chief Complaint   Patient presents with    Medication Problem     Discuss side effects from Prozac     This evaluation was conducted via Zoom using secure and encrypted videoconferencing technology.   The patient was in their home in the [facility].    The patient's identity was confirmed and verbal consent was obtained for this virtual visit.    History Of Present Illness:  Kristen Luna is a 38 y.o. female with major depressive disorder, intermittent explosive disorder, Sandra-Danlos Syndrome type III, gastroparesis, hypothyroidism comes in today for follow up, was last seen 1 week ago.  She is still having struggles with depression.  She stopped taking Prozac 3 days ago as she was noticing some side effects including abnormal leg and facial movements and feeling tired.  She has noticed that the side effects have improved since she stopped Prozac.  She recalls that when she took Prozac previously she had these similar side effects.  She does not think that Geodon has caused any abnormal muscle movements as a side effect in the past.  She has been doing good in regards to sleep and appetite.  She continues to enjoy spending time with her daughter.  She denies any new stressors with her ex.  She is trying to find a new therapist as a previous therapist has left practice.  She denies having thoughts of wanting to hurt herself.    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    Substance Use:  Alcohol - Denies   Nicotine - Denies  Cannabis - Denies   Illicit drugs - Denies     Past Medication Trials:  Prozac 20 mg  x 2 weeks (effective, s/e - fatigue, facial movements, leg movements), Zoloft (drug allergy), Depakote (ineffective, s/e - anxiety), Lamictal, Zyprexa (effective, s/e - weight gain), Seroquel (ineffective), Trazodone 100 mg (effective, discontinued during pregnancy).  She has tried several other medications but is unable to recall them.    Medications:  Current Outpatient Medications   Medication Sig Dispense Refill    vitamin D2, Ergocalciferol, (DRISDOL) 1.25 MG (15344 UT) Cap capsule Take 50,000 Units by mouth every 7 days.      FLUoxetine (PROZAC) 20 MG Cap Take 1 Capsule by mouth every day. 30 Capsule 0    ziprasidone (GEODON) 80 MG Cap Take 1 Capsule by mouth at bedtime. 30 Capsule 0    gabapentin (NEURONTIN) 300 MG Cap Take 1 Capsule by mouth at bedtime. 30 Capsule 1    ondansetron (ZOFRAN ODT) 4 MG TABLET DISPERSIBLE Take 4 mg by mouth every four hours as needed for Nausea. 20 Tablet 1    ondansetron (ZOFRAN) 4 MG Tab tablet Take 1 Tablet by mouth every four hours as needed for Nausea/Vomiting. 20 Tablet 1    levothyroxine (SYNTHROID) 125 MCG Tab Take 125 mcg by mouth every morning on an empty stomach.       No current facility-administered medications for this visit.     Review Of Systems:    Constitutional - Positive for fatigue  Psychiatric - Positive for depression     Physical Examination:  Vital signs: LMP 09/01/2022 (Exact Date)     Musculoskeletal: No abnormal movements.     Mental Status Evaluation:   General: Young female, tearful, dressed in casual attire, good grooming and hygiene, in no apparent distress, calm and cooperative, good eye contact, no psychomotor agitation or retardation  Orientation: Alert and oriented to person, place and time  Recent and remote memory: Grossly intact  Attention span and concentration: Grossly intact  Speech: Spontaneous, normal rate, rhythm and tone  Thought Process: Linear, logical and goal directed  Thought Content: Denies suicidal or homicidal ideations,  "intent or plan  Perception: No delusions noted  Associations: Intact  Language: Appropriate  Fund of knowledge and vocabulary: Grossly adequate  Mood: \"so much better\"  Affect: Euthymic mood congruent  Insight: Good  Judgment: Good    Depression screenin/2/2023    11:39 AM 2023     7:30 AM 2023     7:15 PM   Depression Screen (PHQ-2/PHQ-9)   PHQ-2 Total Score 0 0 0     Interpretation of PHQ-9 Total Score   Score Severity   1-4 No Depression   5-9 Mild Depression   10-14 Moderate Depression   15-19 Moderately Severe Depression   20-27 Severe Depression    Anxiety screenin/21/2022     3:05 PM   PARISH 7   PARISH-7 Total Score 3     Interpretation of PARISH 7 Total Score   Score Severity:  0-4 No Anxiety   5-9 Mild Anxiety  10-14 Moderate Anxiety  15-21 Severe Anxiety    Medical Records/Labs/Diagnostic Tests Reviewed:  [place] PDMP records - one opioid medication prescription in the last 2 years      Impression:  1.  Major depressive disorder, recurrent, moderate with post partum onset - stable      2.  Intermittent explosive disorder - stable  3.  History of bipolar mood disorder and post partum depression    Plan:  1.  Prozac discontinued by patient due to side effects  2.  Start Lexapro 5 mg at bedtime for depression  3.  Continue Geodon 80 mg at bedtime for mood stabilization  -Metabolic monitoring: A1c, lipid profile ordered previously, still pending, reminded her to get labs done before next appointment  4.  Continue Neurontin 300 mg at bedtime for mood stabilization and RLS  5.  She will be reaching out to a few places in the community to get established with a new therapist.  I let her know that if she has difficulty finding therapist in the community to let me know and I will put in a referral to see a therapist in my clinic.    Return to clinic in 4 weeks or sooner if symptoms worsen    The proposed treatment plan was discussed with the patient who was provided the opportunity to ask questions and " make suggestions regarding alternative treatment. Patient verbalized understanding and expressed agreement with the plan.     Socorro Nance M.D.  07/26/23    This note was created using voice recognition software (Dragon). The accuracy of the dictation is limited by the abilities of the software. I have reviewed the note prior to signing, however some errors in grammar and context are still possible. If you have any questions related to this note please do not hesitate to contact our office.

## 2023-08-23 ENCOUNTER — TELEPHONE (OUTPATIENT)
Dept: BEHAVIORAL HEALTH | Facility: CLINIC | Age: 38
End: 2023-08-23
Payer: MEDICARE

## 2023-08-23 NOTE — TELEPHONE ENCOUNTER
"Caller Name: Kristen Luna    Call Back Number: [phone]      How would the patient prefer to be contacted with a response: Phone call do NOT leave a detailed message    Patient called requesting an alternative of lexapro as it is causing her bad side effects. Her Muscle twitching got \"significantly worse.\" She stopped the lexapro on Monday night. Please advise.  "

## 2023-08-25 ENCOUNTER — TELEPHONE (OUTPATIENT)
Dept: OBGYN | Facility: CLINIC | Age: 38
End: 2023-08-25
Payer: MEDICARE

## 2023-08-25 NOTE — TELEPHONE ENCOUNTER
2:00pm : Pt called in c/o irregular bleeding after having a C/S on 6/2/23. Pt stated after delivery she hemorrhaged and had to have multiple blood transfusions and also had a unterine infection. Pt had a normal cycle last month first day being 7/15 with no complications. This month however she is passing quarter sized clots which is not normal for her. I consulted with Dr. Miranda. He stated this can be normal after delivery and it may take a few months for the body to regulate itself again. She should visit the ER if she starts having any light headed/dizziness and or passing very large clots/hemorrhaging. This message was relayed and pt understood. Call ended.

## 2023-08-29 ENCOUNTER — TELEMEDICINE (OUTPATIENT)
Dept: BEHAVIORAL HEALTH | Facility: CLINIC | Age: 38
End: 2023-08-29
Payer: MEDICARE

## 2023-08-29 DIAGNOSIS — F63.81 INTERMITTENT EXPLOSIVE DISORDER: ICD-10-CM

## 2023-08-29 DIAGNOSIS — Z79.899 ENCOUNTER FOR LONG-TERM (CURRENT) USE OF HIGH-RISK MEDICATION: ICD-10-CM

## 2023-08-29 DIAGNOSIS — F33.1 MDD (MAJOR DEPRESSIVE DISORDER), RECURRENT EPISODE, MODERATE (HCC): ICD-10-CM

## 2023-08-29 PROCEDURE — 99214 OFFICE O/P EST MOD 30 MIN: CPT | Mod: 95 | Performed by: PSYCHIATRY & NEUROLOGY

## 2023-08-29 RX ORDER — ZIPRASIDONE HYDROCHLORIDE 60 MG/1
60 CAPSULE ORAL 2 TIMES DAILY
Qty: 60 CAPSULE | Refills: 1 | Status: SHIPPED | OUTPATIENT
Start: 2023-08-29 | End: 2023-10-13

## 2023-08-29 RX ORDER — GABAPENTIN 300 MG/1
300 CAPSULE ORAL
Qty: 30 CAPSULE | Refills: 1 | Status: SHIPPED | OUTPATIENT
Start: 2023-08-29 | End: 2023-11-06

## 2023-08-29 NOTE — PROGRESS NOTES
PSYCHIATRY VIRTUAL VISIT FOLLOW-UP NOTE    Chief Complaint   Patient presents with    Follow-Up     depression     This evaluation was conducted via Zoom using secure and encrypted videoconferencing technology.   The patient was in their home in the [facility].    The patient's identity was confirmed and verbal consent was obtained for this virtual visit.    History Of Present Illness:  Kristen Luna is a 38 y.o. female with major depressive disorder, intermittent explosive disorder, Sandra-Danlos Syndrome type III, gastroparesis, hypothyroidism comes in today for follow up, was last seen 4 weeks ago.  She is still having struggles with depression.  She took Lexapro for about 3 weeks and that did help her depressive symptoms.  However, she started noticing muscle twitching again so she stopped taking it about a week ago and since then the side effects improved.  She mentions that she has noticed these facial twitches ever since she took Prozac for the first time.  She has been on Geodon through both her pregnancies but these symptoms tend to get worse with addition of antidepressants.  She is still having struggles with depression.  She is trying her best to take care of herself.  She is sleeping well and is enjoying time that she gets to spend with her daughter.  She denies having thoughts of wanting to hurt herself.    She mentions that she had blood work done in July and her triglycerides were over 400 and thyroid hormone levels were also abnormal.  She sees APRN in the community and was told of the blood work that they will repeat labs in October and no medication changes were made.    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    Substance Use:  Alcohol - Denies   Nicotine - Denies  Cannabis - Denies   Illicit drugs - Denies     Past Medication Trials:  Prozac 20 mg x 2 weeks (effective, s/e - fatigue, facial movements, leg movements), Lexapro 5 mg x 3 weeks (effective, s/e - muscle twitching), Zoloft (drug allergy), Depakote (ineffective, s/e - anxiety), Lamictal, Zyprexa (effective, s/e - weight gain), Seroquel (ineffective), Trazodone 100 mg (effective, discontinued during pregnancy).  She has tried several other medications but is unable to recall them.    Medications:  Current Outpatient Medications   Medication Sig Dispense Refill    ziprasidone (GEODON) 80 MG Cap Take 1 Capsule by mouth at bedtime. 30 Capsule 0    gabapentin (NEURONTIN) 300 MG Cap Take 1 Capsule by mouth at bedtime. 30 Capsule 1    ondansetron (ZOFRAN ODT) 4 MG TABLET DISPERSIBLE Take 4 mg by mouth every four hours as needed for Nausea. 20 Tablet 1    levothyroxine (SYNTHROID) 125 MCG Tab Take 125 mcg by mouth every morning on an empty stomach.      vitamin D2, Ergocalciferol, (DRISDOL) 1.25 MG (34783 UT) Cap capsule Take 50,000 Units by mouth every 7 days.      ondansetron (ZOFRAN) 4 MG Tab tablet Take 1 Tablet by mouth every four hours as needed for Nausea/Vomiting. 20 Tablet 1     No current facility-administered medications for this visit.     Review Of Systems:    Constitutional - Positive for fatigue  Psychiatric - Positive for depression     Physical Examination:  Vital signs: There were no vitals taken for this visit.    Musculoskeletal: No abnormal movements.     Mental Status Evaluation:   General: Young female, tearful, dressed in casual attire, good grooming and hygiene, in no apparent distress, calm and cooperative, good eye contact, no psychomotor agitation or retardation  Orientation: Alert and oriented to person, place and time  Recent and remote memory: Grossly  "intact  Attention span and concentration: Grossly intact  Speech: Spontaneous, normal rate, rhythm and tone  Thought Process: Linear, logical and goal directed  Thought Content: Denies suicidal or homicidal ideations, intent or plan  Perception: No delusions noted  Associations: Intact  Language: Appropriate  Fund of knowledge and vocabulary: Grossly adequate  Mood: \"alright\"  Affect: Euthymic mood congruent  Insight: Good  Judgment: Good    Depression screenin/2/2023    11:39 AM 2023     7:30 AM 2023     7:15 PM   Depression Screen (PHQ-2/PHQ-9)   PHQ-2 Total Score 0 0 0     Interpretation of PHQ-9 Total Score   Score Severity   1-4 No Depression   5-9 Mild Depression   10-14 Moderate Depression   15-19 Moderately Severe Depression   20-27 Severe Depression    Anxiety screenin/21/2022     3:05 PM   PARISH 7   PARISH-7 Total Score 3     Interpretation of PARISH 7 Total Score   Score Severity:  0-4 No Anxiety   5-9 Mild Anxiety  10-14 Moderate Anxiety  15-21 Severe Anxiety    Medical Records/Labs/Diagnostic Tests Reviewed:  [facility] records - one opioid medication prescription in the last 2 years      Impression:  1.  Major depressive disorder, recurrent, moderate with post partum onset - stable      2.  Intermittent explosive disorder - stable  3.  Long-term use of medications  - Geodon   4.  History of bipolar mood disorder and post partum depression    Plan:  1.  Lexapro discontinued by patient due to side effects  2.  Increase Geodon to 60 mg twice daily for mood stabilization  -Metabolic monitoring: Lipid profile and A1c ordered again today  3.  Discussed that if her lipid panel is abnormal she will need to reach out to her PCP to discuss medications.  She has unfortunately not responded to SSRI medications for depression and the only medication that has worked for her mental health in the last few years is Geodon.  She is aware of the risk of dyslipidemia with Geodon.  Discussed risks versus " benefit and it would be beneficial to start a statin medication while continuing Geodon to manage postpartum depression.  4.  Continue Neurontin 300 mg at bedtime for mood stabilization and relapse    Return to clinic in 6 weeks or sooner if symptoms worsen    The proposed treatment plan was discussed with the patient who was provided the opportunity to ask questions and make suggestions regarding alternative treatment. Patient verbalized understanding and expressed agreement with the plan.     Socorro Nance M.D.  08/29/23    This note was created using voice recognition software (Dragon). The accuracy of the dictation is limited by the abilities of the software. I have reviewed the note prior to signing, however some errors in grammar and context are still possible. If you have any questions related to this note please do not hesitate to contact our office.

## 2023-09-05 ENCOUNTER — HOSPITAL ENCOUNTER (OUTPATIENT)
Dept: LAB | Facility: MEDICAL CENTER | Age: 38
End: 2023-09-05
Attending: OBSTETRICS & GYNECOLOGY
Payer: MEDICARE

## 2023-09-05 ENCOUNTER — HOSPITAL ENCOUNTER (OUTPATIENT)
Dept: LAB | Facility: MEDICAL CENTER | Age: 38
End: 2023-09-05
Attending: PSYCHIATRY & NEUROLOGY
Payer: MEDICARE

## 2023-09-05 DIAGNOSIS — Z79.899 ENCOUNTER FOR LONG-TERM (CURRENT) USE OF HIGH-RISK MEDICATION: ICD-10-CM

## 2023-09-05 DIAGNOSIS — Z79.899 ENCOUNTER FOR LONG-TERM (CURRENT) USE OF MEDICATIONS: ICD-10-CM

## 2023-09-05 DIAGNOSIS — O09.92 PREGNANCY, SUPERVISION, HIGH-RISK, SECOND TRIMESTER: ICD-10-CM

## 2023-09-05 LAB
BASOPHILS # BLD AUTO: 0.3 % (ref 0–1.8)
BASOPHILS # BLD: 0.02 K/UL (ref 0–0.12)
CHOLEST SERPL-MCNC: 208 MG/DL (ref 100–199)
EOSINOPHIL # BLD AUTO: 0.04 K/UL (ref 0–0.51)
EOSINOPHIL NFR BLD: 0.7 % (ref 0–6.9)
ERYTHROCYTE [DISTWIDTH] IN BLOOD BY AUTOMATED COUNT: 40.6 FL (ref 35.9–50)
EST. AVERAGE GLUCOSE BLD GHB EST-MCNC: 97 MG/DL
FASTING STATUS PATIENT QL REPORTED: NORMAL
HBA1C MFR BLD: 5 % (ref 4–5.6)
HCT VFR BLD AUTO: 39.3 % (ref 37–47)
HDLC SERPL-MCNC: 38 MG/DL
HGB BLD-MCNC: 12.7 G/DL (ref 12–16)
IMM GRANULOCYTES # BLD AUTO: 0.02 K/UL (ref 0–0.11)
IMM GRANULOCYTES NFR BLD AUTO: 0.3 % (ref 0–0.9)
LDLC SERPL CALC-MCNC: 156 MG/DL
LYMPHOCYTES # BLD AUTO: 1.39 K/UL (ref 1–4.8)
LYMPHOCYTES NFR BLD: 22.9 % (ref 22–41)
MCH RBC QN AUTO: 28.5 PG (ref 27–33)
MCHC RBC AUTO-ENTMCNC: 32.3 G/DL (ref 32.2–35.5)
MCV RBC AUTO: 88.3 FL (ref 81.4–97.8)
MONOCYTES # BLD AUTO: 0.48 K/UL (ref 0–0.85)
MONOCYTES NFR BLD AUTO: 7.9 % (ref 0–13.4)
NEUTROPHILS # BLD AUTO: 4.12 K/UL (ref 1.82–7.42)
NEUTROPHILS NFR BLD: 67.9 % (ref 44–72)
NRBC # BLD AUTO: 0 K/UL
NRBC BLD-RTO: 0 /100 WBC (ref 0–0.2)
PLATELET # BLD AUTO: 204 K/UL (ref 164–446)
PMV BLD AUTO: 10.9 FL (ref 9–12.9)
RBC # BLD AUTO: 4.45 M/UL (ref 4.2–5.4)
T PALLIDUM AB SER QL IA: NORMAL
TRIGL SERPL-MCNC: 68 MG/DL (ref 0–149)
WBC # BLD AUTO: 6.1 K/UL (ref 4.8–10.8)

## 2023-09-05 PROCEDURE — 80061 LIPID PANEL: CPT

## 2023-09-05 PROCEDURE — 83036 HEMOGLOBIN GLYCOSYLATED A1C: CPT | Mod: GA

## 2023-09-05 PROCEDURE — 36415 COLL VENOUS BLD VENIPUNCTURE: CPT | Mod: GA

## 2023-09-05 PROCEDURE — 85025 COMPLETE CBC W/AUTO DIFF WBC: CPT

## 2023-09-05 PROCEDURE — 86780 TREPONEMA PALLIDUM: CPT | Mod: GA

## 2023-09-13 ENCOUNTER — HOSPITAL ENCOUNTER (OUTPATIENT)
Facility: MEDICAL CENTER | Age: 38
End: 2023-09-13
Attending: NURSE PRACTITIONER
Payer: MEDICARE

## 2023-09-13 PROCEDURE — 80053 COMPREHEN METABOLIC PANEL: CPT

## 2023-09-13 PROCEDURE — 80061 LIPID PANEL: CPT

## 2023-09-13 PROCEDURE — 84443 ASSAY THYROID STIM HORMONE: CPT

## 2023-09-13 PROCEDURE — 84439 ASSAY OF FREE THYROXINE: CPT

## 2023-09-13 PROCEDURE — 82306 VITAMIN D 25 HYDROXY: CPT

## 2023-09-14 LAB
25(OH)D3 SERPL-MCNC: 40 NG/ML (ref 30–100)
ALBUMIN SERPL BCP-MCNC: 4.6 G/DL (ref 3.2–4.9)
ALBUMIN/GLOB SERPL: 1.8 G/DL
ALP SERPL-CCNC: 88 U/L (ref 30–99)
ALT SERPL-CCNC: 13 U/L (ref 2–50)
ANION GAP SERPL CALC-SCNC: 11 MMOL/L (ref 7–16)
AST SERPL-CCNC: 7 U/L (ref 12–45)
BILIRUB SERPL-MCNC: 0.3 MG/DL (ref 0.1–1.5)
BUN SERPL-MCNC: 9 MG/DL (ref 8–22)
CALCIUM ALBUM COR SERPL-MCNC: 8.8 MG/DL (ref 8.5–10.5)
CALCIUM SERPL-MCNC: 9.3 MG/DL (ref 8.5–10.5)
CHLORIDE SERPL-SCNC: 108 MMOL/L (ref 96–112)
CHOLEST SERPL-MCNC: 222 MG/DL (ref 100–199)
CO2 SERPL-SCNC: 22 MMOL/L (ref 20–33)
CREAT SERPL-MCNC: 0.63 MG/DL (ref 0.5–1.4)
GFR SERPLBLD CREATININE-BSD FMLA CKD-EPI: 116 ML/MIN/1.73 M 2
GLOBULIN SER CALC-MCNC: 2.5 G/DL (ref 1.9–3.5)
GLUCOSE SERPL-MCNC: 116 MG/DL (ref 65–99)
HDLC SERPL-MCNC: 38 MG/DL
LDLC SERPL CALC-MCNC: 152 MG/DL
POTASSIUM SERPL-SCNC: 3.9 MMOL/L (ref 3.6–5.5)
PROT SERPL-MCNC: 7.1 G/DL (ref 6–8.2)
SODIUM SERPL-SCNC: 141 MMOL/L (ref 135–145)
T4 FREE SERPL-MCNC: 1.48 NG/DL (ref 0.93–1.7)
TRIGL SERPL-MCNC: 161 MG/DL (ref 0–149)
TSH SERPL DL<=0.005 MIU/L-ACNC: 0.06 UIU/ML (ref 0.38–5.33)

## 2023-09-19 ENCOUNTER — TELEPHONE (OUTPATIENT)
Dept: OBGYN | Facility: CLINIC | Age: 38
End: 2023-09-19

## 2023-09-19 NOTE — TELEPHONE ENCOUNTER
Pt called in c/o irregular bleeding after having a C/S on 6/2/23. Pt stated after delivery she hemorrhaged . Pt states is still passing blood clots that comes and goes. Pt is currently not having heavy VB. Advised pt it can take a few months for the body to regulate itself again. Advised if she is having heavy VB like a period that is filling a pad within every hour to head to ER to be further evaluated. Pt understood and transferred to [facility] for scheduling.

## 2023-09-28 ENCOUNTER — GYNECOLOGY VISIT (OUTPATIENT)
Dept: OBGYN | Facility: CLINIC | Age: 38
End: 2023-09-28
Payer: MEDICARE

## 2023-09-28 VITALS — WEIGHT: 124 LBS | SYSTOLIC BLOOD PRESSURE: 112 MMHG | BODY MASS INDEX: 21.97 KG/M2 | DIASTOLIC BLOOD PRESSURE: 69 MMHG

## 2023-09-28 DIAGNOSIS — Z30.09 ENCOUNTER FOR GENERAL COUNSELING AND ADVICE ON CONTRACEPTIVE MANAGEMENT: ICD-10-CM

## 2023-09-28 DIAGNOSIS — N92.0 MENORRHAGIA WITH REGULAR CYCLE: ICD-10-CM

## 2023-09-28 PROCEDURE — 3074F SYST BP LT 130 MM HG: CPT | Performed by: OBSTETRICS & GYNECOLOGY

## 2023-09-28 PROCEDURE — 99212 OFFICE O/P EST SF 10 MIN: CPT | Performed by: OBSTETRICS & GYNECOLOGY

## 2023-09-28 PROCEDURE — 3078F DIAST BP <80 MM HG: CPT | Performed by: OBSTETRICS & GYNECOLOGY

## 2023-09-28 ASSESSMENT — FIBROSIS 4 INDEX: FIB4 SCORE: 0.36

## 2023-09-28 NOTE — PROGRESS NOTES
GYN F/U    Cc: heavy menses    HPI: 38 y.o.  here with complaints of heavy menses.  She reports her menses have been approximately every 3 weeks lasting 7 to 8 days and heavy with clots since she restarted menses after her  delivery in  of this year.  That delivery was complicated by postpartum hemorrhage requiring suction D&C and Bakri balloon placement as well as blood transfusion.  She states she wants no further pregnancies but does want to leave the option open?  She has questions about endometrial ablation.  We discussed that at the time of endometrial ablation she needs to have a permanent form of birth control and that I would recommend bilateral salpingectomy via laparoscopy at the time of endometrial ablation.  I recommended Mirena IUD for control of heavy menses and that this device does have FDA approval for this diagnoses.  She states she is scared to have an IUD placed but states she did well with Nexplanon in the past.  She states she still had menses on the Nexplanon but they were not heavy and were more regular.  At this time she would like to proceed with Nexplanon placement for both contraception and menorrhagia.  I did discuss that we can give this a try but if this does not work I would again recommend Mirena IUD prior to a permanent surgical procedure.  She and her friend that is with her verbalizing understanding merna and Kristen states she will schedule for Nexplanon placement.`        ROS:  Gen: denies fevers, general concerns  Abd: denies abdominal pain      Past Medical History:   Diagnosis Date    Arrhythmia     SVT no episodes since     Bipolar affective disorder (HCC)     Breath shortness     at noc [id number]; primary MD aware    Complete placenta previa nos or without hemorrhage, second trimester 2023    Complete previa on 20 week US with [facility]- has repeat scheduled    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    Depression     Gastroparesis      Heart burn     Hypothyroid     Pain     Left knee 5/10    Pregnant     PTSD (post-traumatic stress disorder)        /69   Wt 124 lb   BMI 21.97 kg/m²   Gen; AAO, NAD  Gu:  deferred    A/P: 38 y.o.  with   1. Menorrhagia with regular cycle        2. Encounter for general counseling and advice on contraceptive management              F/U: for Nexplanon placement

## 2023-09-28 NOTE — PROGRESS NOTES
Pt here for f/u BC and concerned her periods are coming out clots.  [phone]  Pharmacy verified

## 2023-10-13 ENCOUNTER — TELEMEDICINE (OUTPATIENT)
Dept: BEHAVIORAL HEALTH | Facility: CLINIC | Age: 38
End: 2023-10-13
Payer: MEDICARE

## 2023-10-13 DIAGNOSIS — F63.81 INTERMITTENT EXPLOSIVE DISORDER: ICD-10-CM

## 2023-10-13 DIAGNOSIS — F33.1 MDD (MAJOR DEPRESSIVE DISORDER), RECURRENT EPISODE, MODERATE (HCC): ICD-10-CM

## 2023-10-13 PROCEDURE — 90833 PSYTX W PT W E/M 30 MIN: CPT | Mod: 95 | Performed by: PSYCHIATRY & NEUROLOGY

## 2023-10-13 PROCEDURE — 99214 OFFICE O/P EST MOD 30 MIN: CPT | Mod: 95 | Performed by: PSYCHIATRY & NEUROLOGY

## 2023-10-13 RX ORDER — LEVOTHYROXINE SODIUM 0.1 MG/1
TABLET ORAL
COMMUNITY
Start: 2023-09-19

## 2023-10-13 RX ORDER — ATORVASTATIN CALCIUM 10 MG/1
10 TABLET, FILM COATED ORAL
COMMUNITY
Start: 2023-09-19

## 2023-10-13 RX ORDER — ZIPRASIDONE HYDROCHLORIDE 80 MG/1
80 CAPSULE ORAL
Qty: 30 CAPSULE | Refills: 1 | Status: SHIPPED | OUTPATIENT
Start: 2023-10-13 | End: 2023-12-14 | Stop reason: SDUPTHER

## 2023-10-13 RX ORDER — DULOXETIN HYDROCHLORIDE 20 MG/1
20 CAPSULE, DELAYED RELEASE ORAL
Qty: 30 CAPSULE | Refills: 1 | Status: SHIPPED | OUTPATIENT
Start: 2023-10-13 | End: 2023-12-14 | Stop reason: SDUPTHER

## 2023-10-13 ASSESSMENT — PATIENT HEALTH QUESTIONNAIRE - PHQ9
4. FEELING TIRED OR HAVING LITTLE ENERGY: MORE THAN HALF THE DAYS
3. TROUBLE FALLING OR STAYING ASLEEP OR SLEEPING TOO MUCH: NOT AT ALL
7. TROUBLE CONCENTRATING ON THINGS, SUCH AS READING THE NEWSPAPER OR WATCHING TELEVISION: NOT AT ALL
5. POOR APPETITE OR OVEREATING: MORE THAN HALF THE DAYS
2. FEELING DOWN, DEPRESSED, IRRITABLE, OR HOPELESS: MORE THAN HALF THE DAYS
9. THOUGHTS THAT YOU WOULD BE BETTER OFF DEAD, OR OF HURTING YOURSELF: NOT AT ALL
SUM OF ALL RESPONSES TO PHQ QUESTIONS 1-9: 10
SUM OF ALL RESPONSES TO PHQ9 QUESTIONS 1 AND 2: 4
8. MOVING OR SPEAKING SO SLOWLY THAT OTHER PEOPLE COULD HAVE NOTICED. OR THE OPPOSITE, BEING SO FIGETY OR RESTLESS THAT YOU HAVE BEEN MOVING AROUND A LOT MORE THAN USUAL: NOT AT ALL
6. FEELING BAD ABOUT YOURSELF - OR THAT YOU ARE A FAILURE OR HAVE LET YOURSELF OR YOUR FAMILY DOWN: MORE THAN HALF THE DAYS
1. LITTLE INTEREST OR PLEASURE IN DOING THINGS: MORE THAN HALF THE DAYS

## 2023-10-13 NOTE — PROGRESS NOTES
PSYCHIATRY VIRTUAL VISIT FOLLOW-UP NOTE    Chief Complaint   Patient presents with    Follow-Up     depression     This evaluation was conducted via Zoom using secure and encrypted videoconferencing technology.   The patient was in their home in the [facility].    The patient's identity was confirmed and verbal consent was obtained for this virtual visit.    History Of Present Illness:  Kristen Luna is a 38 y.o. female with major depressive disorder, intermittent explosive disorder, Sandra-Danlos Syndrome type III, gastroparesis, hypothyroidism comes in today for follow up, was last seen 6 weeks ago.  She has been noticing increase struggles with depression.  Her stepfather asked her to move out over the weekend as he did not like how she parents her daughter.  She and her daughter are temporarily living with a friend.  She is not sure how long she can live with this friend that she has been working with [facility] on finding a place for her and her daughter.  She has been working part-time but is finding it difficult to show up on a regular basis.  She did try higher dose of Geodon but the morning dose made her too sleepy so she is taking 60 mg at bedtime.  She did try to take 120 mg at bedtime but that also caused daytime fatigue.  She has been struggling with her appetite as well.  She denies having thoughts of wanting to hurt herself.    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    Substance Use:  Alcohol - Denies   Nicotine - Smokes 1 pack a week  Cannabis - Denies   Illicit drugs - Denies     Past  Medication Trials:  Prozac 20 mg x 2 weeks (effective, s/e - fatigue, facial movements, leg movements), Lexapro 5 mg x 3 weeks (effective, s/e - muscle twitching), Zoloft (drug allergy), Depakote (ineffective, s/e - anxiety), Lamictal, Zyprexa (effective, s/e - weight gain), Seroquel (ineffective), Trazodone 100 mg (effective, discontinued during pregnancy).  She has tried several other medications but is unable to recall them.    Medications:  Current Outpatient Medications   Medication Sig Dispense Refill    levothyroxine (SYNTHROID) 100 MCG Tab TAKE 1 TABLET BY MOUTH ONCE DAILY IN THE MORNING WITH 8 OZ WATER      atorvastatin (LIPITOR) 10 MG Tab Take 10 mg by mouth at bedtime.      ziprasidone (GEODON) 80 MG Cap Take 1 Capsule by mouth at bedtime. 30 Capsule 1    DULoxetine (CYMBALTA) 20 MG Cap DR Particles Take 1 Capsule by mouth at bedtime. 30 Capsule 1    gabapentin (NEURONTIN) 300 MG Cap Take 1 Capsule by mouth at bedtime. 30 Capsule 1    vitamin D2, Ergocalciferol, (DRISDOL) 1.25 MG (19076 UT) Cap capsule Take 50,000 Units by mouth every 7 days.      ondansetron (ZOFRAN ODT) 4 MG TABLET DISPERSIBLE Take 4 mg by mouth every four hours as needed for Nausea. 20 Tablet 1    ondansetron (ZOFRAN) 4 MG Tab tablet Take 1 Tablet by mouth every four hours as needed for Nausea/Vomiting. 20 Tablet 1     No current facility-administered medications for this visit.     Review Of Systems:    Constitutional - Positive for fatigue  Psychiatric - Positive for depression     Physical Examination:  Vital signs: There were no vitals taken for this visit.    Musculoskeletal: No abnormal movements.     Mental Status Evaluation:   General: Young female, tearful, dressed in casual attire, good grooming and hygiene, in no apparent distress, calm and cooperative, good eye contact, no psychomotor agitation or retardation  Orientation: Alert and oriented to person, place and time  Recent and remote memory: Grossly intact  Attention span  "and concentration: Grossly intact  Speech: Spontaneous, normal rate, rhythm and tone  Thought Process: Linear, logical and goal directed  Thought Content: Denies suicidal or homicidal ideations, intent or plan  Perception: No delusions noted  Associations: Intact  Language: Appropriate  Fund of knowledge and vocabulary: Grossly adequate  Mood: \"worse\"  Affect: Dysphoric, mood congruent  Insight: Good  Judgment: Good    Depression screenin/4/2023     7:30 AM 2023     7:15 PM 10/13/2023     1:29 PM   Depression Screen (PHQ-2/PHQ-9)   PHQ-2 Total Score 0 0 4   PHQ-9 Total Score   10     Interpretation of PHQ-9 Total Score   Score Severity   1-4 No Depression   5-9 Mild Depression   10-14 Moderate Depression   15-19 Moderately Severe Depression   20-27 Severe Depression    Anxiety screenin/21/2022     3:05 PM   PARISH 7   PARISH-7 Total Score 3     Interpretation of PARISH 7 Total Score   Score Severity:  0-4 No Anxiety   5-9 Mild Anxiety  10-14 Moderate Anxiety  15-21 Severe Anxiety    Medical Records/Labs/Diagnostic Tests Reviewed:  [place] PDMP records - one opioid medication prescription in the last 2 years  Labs from 2023 - lipid profile with elevated total cholesterol, triglycerides and LDL, A1c normal at 5      Impression:  1.  Major depressive disorder, recurrent, moderate with post partum onset - worsening      2.  Intermittent explosive disorder - stable  3.  History of bipolar mood disorder and post partum depression    Plan:  1.  Start Cymbalta 20 mg at bedtime for depression   2.  Increase Geodon to 80 mg at bedtime for depression augmentation  -Yearly Metabolic monitoring (2023): lipid profile with elevated total cholesterol, triglycerides and LDL, A1c normal at 5  -Repeat yearly metabolic monitoring labs due in 2024  3.  Continue Neurontin 300 mg at bedtime for mood stabilization  4.  She will benefit from intensive outpatient program but she does not drive and is living in [facility] and " so cannot commit to the program.  5.  I will provide her a list of community resources for psychotherapy and I have advised her to try and get back into psychotherapy as soon as possible.  I let her know that our clinic is currently not taking new patients for psychotherapy.  6.  Provided supportive psychotherapy (> 16 minutes): Increased stressors and worsening struggles with depression, encouraged her to continue reaching out to possible resources, advised continued self-care    Return to clinic in 4 weeks or sooner if symptoms worsen    The proposed treatment plan was discussed with the patient who was provided the opportunity to ask questions and make suggestions regarding alternative treatment. Patient verbalized understanding and expressed agreement with the plan.     Socorro Nance M.D.  10/13/23    This note was created using voice recognition software (Dragon). The accuracy of the dictation is limited by the abilities of the software. I have reviewed the note prior to signing, however some errors in grammar and context are still possible. If you have any questions related to this note please do not hesitate to contact our office.    Patient/Caregiver provided printed discharge information.

## 2023-10-22 ENCOUNTER — HOSPITAL ENCOUNTER (EMERGENCY)
Facility: MEDICAL CENTER | Age: 38
End: 2023-10-22
Attending: EMERGENCY MEDICINE
Payer: MEDICARE

## 2023-10-22 VITALS
SYSTOLIC BLOOD PRESSURE: 127 MMHG | BODY MASS INDEX: 21.88 KG/M2 | RESPIRATION RATE: 16 BRPM | HEIGHT: 63 IN | WEIGHT: 123.46 LBS | OXYGEN SATURATION: 98 % | DIASTOLIC BLOOD PRESSURE: 83 MMHG | HEART RATE: 62 BPM | TEMPERATURE: 97 F

## 2023-10-22 DIAGNOSIS — K08.89 DENTALGIA: ICD-10-CM

## 2023-10-22 PROCEDURE — 99283 EMERGENCY DEPT VISIT LOW MDM: CPT

## 2023-10-22 PROCEDURE — A9270 NON-COVERED ITEM OR SERVICE: HCPCS | Performed by: EMERGENCY MEDICINE

## 2023-10-22 PROCEDURE — 700102 HCHG RX REV CODE 250 W/ 637 OVERRIDE(OP): Performed by: EMERGENCY MEDICINE

## 2023-10-22 RX ORDER — CLINDAMYCIN HYDROCHLORIDE 150 MG/1
300 CAPSULE ORAL ONCE
Status: COMPLETED | OUTPATIENT
Start: 2023-10-22 | End: 2023-10-22

## 2023-10-22 RX ORDER — OXYCODONE HYDROCHLORIDE AND ACETAMINOPHEN 5; 325 MG/1; MG/1
1 TABLET ORAL ONCE
Status: COMPLETED | OUTPATIENT
Start: 2023-10-22 | End: 2023-10-22

## 2023-10-22 RX ORDER — OXYCODONE HYDROCHLORIDE AND ACETAMINOPHEN 5; 325 MG/1; MG/1
1 TABLET ORAL EVERY 8 HOURS PRN
Qty: 10 TABLET | Refills: 0 | Status: SHIPPED | OUTPATIENT
Start: 2023-10-22 | End: 2023-10-25

## 2023-10-22 RX ORDER — CLINDAMYCIN HYDROCHLORIDE 300 MG/1
300 CAPSULE ORAL 3 TIMES DAILY
Qty: 30 CAPSULE | Refills: 0 | Status: ACTIVE | OUTPATIENT
Start: 2023-10-22 | End: 2023-11-01

## 2023-10-22 RX ADMIN — CLINDAMYCIN HYDROCHLORIDE 300 MG: 150 CAPSULE ORAL at 22:24

## 2023-10-22 RX ADMIN — OXYCODONE AND ACETAMINOPHEN 1 TABLET: 325; 5 TABLET ORAL at 22:24

## 2023-10-22 ASSESSMENT — FIBROSIS 4 INDEX: FIB4 SCORE: 0.36

## 2023-10-23 ENCOUNTER — APPOINTMENT (OUTPATIENT)
Dept: OBGYN | Facility: CLINIC | Age: 38
End: 2023-10-23
Payer: MEDICARE

## 2023-10-23 NOTE — DISCHARGE INSTRUCTIONS
Follow-up with a dentist or oral surgeon as discussed for definitive management.  Return to the emergency department for increased pain or facial swelling.

## 2023-10-23 NOTE — ED TRIAGE NOTES
"Chief Complaint   Patient presents with    Dental Pain     Lower left Jaw   Has a tooth that has a cavity that is to the bone   11/7 is her appointment with her dentist        BP (!) 138/98   Pulse 67   Temp 36.1 °C (96.9 °F) (Temporal)   Resp 16   Ht 1.588 m (5' 2.5\")   Wt 56 kg (123 lb 7.3 oz)   SpO2 97%   BMI 22.22 kg/m²     "

## 2023-10-23 NOTE — ED PROVIDER NOTES
ED Provider Note    CHIEF COMPLAINT  Chief Complaint   Patient presents with    Dental Pain     Lower left Jaw   Has a tooth that has a cavity that is to the bone    is her appointment with her dentist          ELSY/MARLEYN    Kristen Luna is a 38 y.o. female who presents with dental pain.  The patient states that she has a known cavity to the left lower molar.  She was recently [facility] by [facility] and a scheduled extraction on .  Her last couple of days she had a sudden increase in pain.  She does not have any facial swelling.  She does not have any associated fevers.  The pain is localized to the left lower molar.    PAST MEDICAL HISTORY   has a past medical history of Arrhythmia, Bipolar affective disorder (HCC), Breath shortness, Complete placenta previa nos or without hemorrhage, second trimester (2023), Connective tissue anomaly, Depression, Gastroparesis, Heart burn, Hypothyroid, Pain, Pregnant, and PTSD (post-traumatic stress disorder).    SURGICAL HISTORY   has a past surgical history that includes knee arthroscopy (10/2009); dental extraction(s) (); knee arthroscopy (2010); synovectomy (2010); meniscectomy, knee, medial (2010); other ();  delivery only (N/A, 2020); repeat c section (N/A, 2023); and dilation and curettage (N/A, 2023).    FAMILY HISTORY  Family History   Problem Relation Age of Onset    Depression Mother     Heart Disease Mother     Bipolar disorder Maternal Aunt        SOCIAL HISTORY  Social History     Tobacco Use    Smoking status: Every Day     Current packs/day: 0.00     Average packs/day: 0.5 packs/day for 1.5 years (0.7 ttl pk-yrs)     Types: Cigarettes     Start date: 2008     Last attempt to quit: 2009     Years since quittin.1    Smokeless tobacco: Never    Tobacco comments:     1 pack a week   Vaping Use    Vaping Use: Never used   Substance and Sexual Activity    Alcohol use: No    Drug use: Not  "Currently     Types: Marijuana, Inhaled    Sexual activity: Yes     Partners: Male       CURRENT MEDICATIONS  Home Medications    **Home medications have not yet been reviewed for this encounter**         ALLERGIES  Allergies   Allergen Reactions    Reglan [Metoclopramide]     Codeine Itching    Fentanyl Photosensitivity     Reports blurred vision     Hydrocodone Itching    Zoloft Swelling     Left hand swelling       PHYSICAL EXAM  VITAL SIGNS: BP (!) 138/98   Pulse 67   Temp 36.1 °C (96.9 °F) (Temporal)   Resp 16   Ht 1.588 m (5' 2.5\")   Wt 56 kg (123 lb 7.3 oz)   SpO2 97%   BMI 22.22 kg/m²    In general the patient does not appear toxic    Facial exam no erythema nor edema appreciated    Oral cavity the patient does have obvious decay to the left lower molar with pain with percussion.  There is no gingival fluctuance nor induration    Neck is supple without adenopathy      COURSE & MEDICAL DECISION MAKING    This is a 38-year-old female who presents with dentalgia.  She has had a significant increase in pain over the last 24 to 48 hours therefore she may have developed an infectious process.  She will receive a dose of clindamycin and Percocet prior to discharge.  The patient will receive a 3-day course of Percocet for acute pain control until she can get an to a dentist for definitive management.  I will give the patient contact information for the oral surgeon Dr. Bennett as well as Dr. Al  She will also attempt to get a hold of [facility] to see if they can extract the decayed molar sooner.  If she develops facial swelling she will return for repeat examination.    We will perform a narcotic check prior to prescribe the medication.  The patient to take medication sparingly only as needed if Motrin is not effective.    Of note it does state that she is allergic to hydrocodone from itching but suspect this is just a histamine release.  I suspect this is not from a true allergy.  FINAL " DIAGNOSIS  Dentalgia    Disposition  The patient will be discharged in stable condition       Electronically signed by: Dion Gipson M.D., 10/22/2023 10:14 PM

## 2023-11-20 ENCOUNTER — APPOINTMENT (OUTPATIENT)
Dept: BEHAVIORAL HEALTH | Facility: CLINIC | Age: 38
End: 2023-11-20
Payer: MEDICARE

## 2023-11-27 ENCOUNTER — HOSPITAL ENCOUNTER (EMERGENCY)
Facility: MEDICAL CENTER | Age: 38
End: 2023-11-27
Attending: EMERGENCY MEDICINE
Payer: MEDICARE

## 2023-11-27 VITALS
SYSTOLIC BLOOD PRESSURE: 144 MMHG | HEART RATE: 73 BPM | RESPIRATION RATE: 18 BRPM | TEMPERATURE: 98.3 F | WEIGHT: 127 LBS | OXYGEN SATURATION: 97 % | DIASTOLIC BLOOD PRESSURE: 81 MMHG | BODY MASS INDEX: 22.86 KG/M2

## 2023-11-27 DIAGNOSIS — G44.209 TENSION HEADACHE: ICD-10-CM

## 2023-11-27 LAB
ALBUMIN SERPL BCP-MCNC: 5 G/DL (ref 3.2–4.9)
ALBUMIN/GLOB SERPL: 1.7 G/DL
ALP SERPL-CCNC: 99 U/L (ref 30–99)
ALT SERPL-CCNC: 15 U/L (ref 2–50)
ANION GAP SERPL CALC-SCNC: 12 MMOL/L (ref 7–16)
AST SERPL-CCNC: 12 U/L (ref 12–45)
BASOPHILS # BLD AUTO: 0.3 % (ref 0–1.8)
BASOPHILS # BLD: 0.03 K/UL (ref 0–0.12)
BILIRUB SERPL-MCNC: 0.4 MG/DL (ref 0.1–1.5)
BUN SERPL-MCNC: 12 MG/DL (ref 8–22)
CALCIUM ALBUM COR SERPL-MCNC: 8.8 MG/DL (ref 8.5–10.5)
CALCIUM SERPL-MCNC: 9.6 MG/DL (ref 8.4–10.2)
CHLORIDE SERPL-SCNC: 104 MMOL/L (ref 96–112)
CO2 SERPL-SCNC: 26 MMOL/L (ref 20–33)
CREAT SERPL-MCNC: 0.54 MG/DL (ref 0.5–1.4)
EOSINOPHIL # BLD AUTO: 0.06 K/UL (ref 0–0.51)
EOSINOPHIL NFR BLD: 0.6 % (ref 0–6.9)
ERYTHROCYTE [DISTWIDTH] IN BLOOD BY AUTOMATED COUNT: 42.9 FL (ref 35.9–50)
GFR SERPLBLD CREATININE-BSD FMLA CKD-EPI: 120 ML/MIN/1.73 M 2
GLOBULIN SER CALC-MCNC: 3 G/DL (ref 1.9–3.5)
GLUCOSE SERPL-MCNC: 95 MG/DL (ref 65–99)
HCG SERPL QL: NEGATIVE
HCT VFR BLD AUTO: 46.5 % (ref 37–47)
HGB BLD-MCNC: 16 G/DL (ref 12–16)
IMM GRANULOCYTES # BLD AUTO: 0.02 K/UL (ref 0–0.11)
IMM GRANULOCYTES NFR BLD AUTO: 0.2 % (ref 0–0.9)
LYMPHOCYTES # BLD AUTO: 2.74 K/UL (ref 1–4.8)
LYMPHOCYTES NFR BLD: 28.1 % (ref 22–41)
MCH RBC QN AUTO: 30.6 PG (ref 27–33)
MCHC RBC AUTO-ENTMCNC: 34.4 G/DL (ref 32.2–35.5)
MCV RBC AUTO: 88.9 FL (ref 81.4–97.8)
MONOCYTES # BLD AUTO: 0.93 K/UL (ref 0–0.85)
MONOCYTES NFR BLD AUTO: 9.5 % (ref 0–13.4)
NEUTROPHILS # BLD AUTO: 5.98 K/UL (ref 1.82–7.42)
NEUTROPHILS NFR BLD: 61.3 % (ref 44–72)
NRBC # BLD AUTO: 0 K/UL
NRBC BLD-RTO: 0 /100 WBC (ref 0–0.2)
PLATELET # BLD AUTO: 235 K/UL (ref 164–446)
PMV BLD AUTO: 10.2 FL (ref 9–12.9)
POTASSIUM SERPL-SCNC: 3.9 MMOL/L (ref 3.6–5.5)
PROT SERPL-MCNC: 8 G/DL (ref 6–8.2)
RBC # BLD AUTO: 5.23 M/UL (ref 4.2–5.4)
SODIUM SERPL-SCNC: 142 MMOL/L (ref 135–145)
WBC # BLD AUTO: 9.8 K/UL (ref 4.8–10.8)

## 2023-11-27 PROCEDURE — 96375 TX/PRO/DX INJ NEW DRUG ADDON: CPT

## 2023-11-27 PROCEDURE — 99285 EMERGENCY DEPT VISIT HI MDM: CPT

## 2023-11-27 PROCEDURE — 96374 THER/PROPH/DIAG INJ IV PUSH: CPT

## 2023-11-27 PROCEDURE — 36415 COLL VENOUS BLD VENIPUNCTURE: CPT

## 2023-11-27 PROCEDURE — 700111 HCHG RX REV CODE 636 W/ 250 OVERRIDE (IP): Mod: JZ | Performed by: EMERGENCY MEDICINE

## 2023-11-27 PROCEDURE — 85025 COMPLETE CBC W/AUTO DIFF WBC: CPT

## 2023-11-27 PROCEDURE — 80053 COMPREHEN METABOLIC PANEL: CPT

## 2023-11-27 PROCEDURE — 84703 CHORIONIC GONADOTROPIN ASSAY: CPT

## 2023-11-27 RX ORDER — CYCLOBENZAPRINE HCL 5 MG
5-10 TABLET ORAL 3 TIMES DAILY PRN
Qty: 30 TABLET | Refills: 0 | Status: SHIPPED | OUTPATIENT
Start: 2023-11-27

## 2023-11-27 RX ORDER — HYDROMORPHONE HYDROCHLORIDE 1 MG/ML
0.5 INJECTION, SOLUTION INTRAMUSCULAR; INTRAVENOUS; SUBCUTANEOUS
Status: DISCONTINUED | OUTPATIENT
Start: 2023-11-27 | End: 2023-11-27 | Stop reason: HOSPADM

## 2023-11-27 RX ORDER — KETOROLAC TROMETHAMINE 30 MG/ML
30 INJECTION, SOLUTION INTRAMUSCULAR; INTRAVENOUS ONCE
Status: COMPLETED | OUTPATIENT
Start: 2023-11-27 | End: 2023-11-27

## 2023-11-27 RX ORDER — LORAZEPAM 2 MG/ML
0.5 INJECTION INTRAMUSCULAR ONCE
Status: COMPLETED | OUTPATIENT
Start: 2023-11-27 | End: 2023-11-27

## 2023-11-27 RX ADMIN — HYDROMORPHONE HYDROCHLORIDE 0.5 MG: 1 INJECTION, SOLUTION INTRAMUSCULAR; INTRAVENOUS; SUBCUTANEOUS at 20:09

## 2023-11-27 RX ADMIN — KETOROLAC TROMETHAMINE 30 MG: 30 INJECTION, SOLUTION INTRAMUSCULAR; INTRAVENOUS at 20:00

## 2023-11-27 RX ADMIN — LORAZEPAM 0.5 MG: 2 INJECTION INTRAMUSCULAR; INTRAVENOUS at 19:39

## 2023-11-27 ASSESSMENT — FIBROSIS 4 INDEX: FIB4 SCORE: 0.36

## 2023-11-28 NOTE — ED PROVIDER NOTES
ED Provider Note    CHIEF COMPLAINT  Chief Complaint   Patient presents with    Headache       EXTERNAL RECORDS REVIEWED  Outpatient Notes seen for tooth ache approximately month ago    HPI/ROS  LIMITATION TO HISTORY   Select: : None  OUTSIDE HISTORIAN(S):  Parent mother at bedside    Kristen Luna is a 38 y.o. female who presents to the ED with headache.  The patient states her headache started last night, posterior aspect of her head, radiates around to the front of her head, worse with moving her head.  It came on gradually, does not worst headache of her life.  The patient denies any numbness, tingling, weakness.  The patient has nausea but no vomiting.  The patient does have some photophobia.  The patient has tried some Excedrin for it.  The patient is allergic to Reglan.    PAST MEDICAL HISTORY   has a past medical history of Arrhythmia, Bipolar affective disorder (HCC), Breath shortness, Complete placenta previa nos or without hemorrhage, second trimester (2023), Connective tissue anomaly, Depression, Gastroparesis, Heart burn, Hypothyroid, Pain, Pregnant, and PTSD (post-traumatic stress disorder).    SURGICAL HISTORY   has a past surgical history that includes knee arthroscopy (10/2009); dental extraction(s) (); knee arthroscopy (2010); synovectomy (2010); meniscectomy, knee, medial (2010); other ();  delivery only (N/A, 2020); repeat c section (N/A, 2023); and dilation and curettage (N/A, 2023).    FAMILY HISTORY  Family History   Problem Relation Age of Onset    Depression Mother     Heart Disease Mother     Bipolar disorder Maternal Aunt        SOCIAL HISTORY  Social History     Tobacco Use    Smoking status: Every Day     Current packs/day: 0.00     Average packs/day: 0.5 packs/day for 1.5 years (0.7 ttl pk-yrs)     Types: Cigarettes     Start date: 2008     Last attempt to quit: 2009     Years since quittin.2    Smokeless tobacco: Never     Tobacco comments:     1 pack a week   Vaping Use    Vaping Use: Never used   Substance and Sexual Activity    Alcohol use: No    Drug use: Not Currently     Types: Marijuana, Inhaled    Sexual activity: Yes     Partners: Male       CURRENT MEDICATIONS  Home Medications    **Home medications have not yet been reviewed for this encounter**         ALLERGIES  Allergies   Allergen Reactions    Reglan [Metoclopramide]     Codeine Itching    Fentanyl Photosensitivity     Reports blurred vision     Hydrocodone Itching    Zoloft Swelling     Left hand swelling       PHYSICAL EXAM  VITAL SIGNS: /71   Pulse 69   Temp 36.6 °C (97.8 °F) (Temporal)   Resp 12   Wt 57.6 kg (127 lb)   LMP 10/28/2023   SpO2 97%   BMI 22.86 kg/m²    Well-developed well-nourished 38-year-old female who appears in mild to moderate distress  The patient has tenderness palpation of the base of her head and the bilateral paraspinal cervical area that exacerbates the patient's headache.  That she has full range of motion, no nuchal rigidity.  Oropharynx with dry mucous membranes.    Chest clear to auscultation, regular rate and rhythm,   neuro awake alert speech is clear.    DIAGNOSTIC STUDIES / PROCEDURES  Results for orders placed or performed during the hospital encounter of 11/27/23   CBC WITH DIFFERENTIAL   Result Value Ref Range    WBC 9.8 4.8 - 10.8 K/uL    RBC 5.23 4.20 - 5.40 M/uL    Hemoglobin 16.0 12.0 - 16.0 g/dL    Hematocrit 46.5 37.0 - 47.0 %    MCV 88.9 81.4 - 97.8 fL    MCH 30.6 27.0 - 33.0 pg    MCHC 34.4 32.2 - 35.5 g/dL    RDW 42.9 35.9 - 50.0 fL    Platelet Count 235 164 - 446 K/uL    MPV 10.2 9.0 - 12.9 fL    Neutrophils-Polys 61.30 44.00 - 72.00 %    Lymphocytes 28.10 22.00 - 41.00 %    Monocytes 9.50 0.00 - 13.40 %    Eosinophils 0.60 0.00 - 6.90 %    Basophils 0.30 0.00 - 1.80 %    Immature Granulocytes 0.20 0.00 - 0.90 %    Nucleated RBC 0.00 0.00 - 0.20 /100 WBC    Neutrophils (Absolute) 5.98 1.82 - 7.42 K/uL     Lymphs (Absolute) 2.74 1.00 - 4.80 K/uL    Monos (Absolute) 0.93 (H) 0.00 - 0.85 K/uL    Eos (Absolute) 0.06 0.00 - 0.51 K/uL    Baso (Absolute) 0.03 0.00 - 0.12 K/uL    Immature Granulocytes (abs) 0.02 0.00 - 0.11 K/uL    NRBC (Absolute) 0.00 K/uL   COMP METABOLIC PANEL   Result Value Ref Range    Sodium 142 135 - 145 mmol/L    Potassium 3.9 3.6 - 5.5 mmol/L    Chloride 104 96 - 112 mmol/L    Co2 26 20 - 33 mmol/L    Anion Gap 12.0 7.0 - 16.0    Glucose 95 65 - 99 mg/dL    Bun 12 8 - 22 mg/dL    Creatinine 0.54 0.50 - 1.40 mg/dL    Calcium 9.6 8.4 - 10.2 mg/dL    Correct Calcium 8.8 8.5 - 10.5 mg/dL    AST(SGOT) 12 12 - 45 U/L    ALT(SGPT) 15 2 - 50 U/L    Alkaline Phosphatase 99 30 - 99 U/L    Total Bilirubin 0.4 0.1 - 1.5 mg/dL    Albumin 5.0 (H) 3.2 - 4.9 g/dL    Total Protein 8.0 6.0 - 8.2 g/dL    Globulin 3.0 1.9 - 3.5 g/dL    A-G Ratio 1.7 g/dL   BETA-HCG QUALITATIVE SERUM   Result Value Ref Range    Beta-Hcg Qualitative Serum Negative Negative   ESTIMATED GFR   Result Value Ref Range    GFR (CKD-EPI) 120 >60 mL/min/1.73 m 2        COURSE & MEDICAL DECISION MAKING    ED Observation Status? No; Patient does not meet criteria for ED Observation.     INITIAL ASSESSMENT, COURSE AND PLAN  Care Narrative: Patient with headache, believe it is likely a tension headache.  Patient is allergic to Reglan.  Will give the patient Toradol, some pain medicines, nausea medicines.    Patient feeling improved after Toradol, Dilaudid, Ativan.  Will discharge patient home, discussed with her about tension headache management, Tylenol, ibuprofen, heat,, massage, range of motion.  I will give the patient a prescription for some muscle relaxers.  Have the patient return with worsening symptoms.      DISPOSITION AND DISCUSSIONS  Escalation of care considered, and ultimately not performed:diagnostic imaging CT scan    Barriers to care at this time, including but not limited to:  None .     Decision tools and prescription drugs  considered including, but not limited to: Pain Medications   .    FINAL DIAGNOSIS  1. Tension headache           Electronically signed by: Loki Das M.D., 11/27/2023 7:56 PM

## 2023-11-28 NOTE — ED NOTES
Pt verbalizes understanding of discharge instructions. PT ambulates to lobby with steady gate accompanied by friend and daughter.

## 2023-11-28 NOTE — ED TRIAGE NOTES
Chief Complaint   Patient presents with    Headache      Complains of a migraine, at the base of her skull, photosensitive and nausea, started last night.

## 2023-12-14 ENCOUNTER — TELEMEDICINE (OUTPATIENT)
Dept: BEHAVIORAL HEALTH | Facility: CLINIC | Age: 38
End: 2023-12-14
Payer: MEDICARE

## 2023-12-14 DIAGNOSIS — F33.1 MDD (MAJOR DEPRESSIVE DISORDER), RECURRENT EPISODE, MODERATE (HCC): ICD-10-CM

## 2023-12-14 DIAGNOSIS — F63.81 INTERMITTENT EXPLOSIVE DISORDER: ICD-10-CM

## 2023-12-14 PROCEDURE — 99214 OFFICE O/P EST MOD 30 MIN: CPT | Mod: 95 | Performed by: PSYCHIATRY & NEUROLOGY

## 2023-12-14 RX ORDER — ZIPRASIDONE HYDROCHLORIDE 80 MG/1
80 CAPSULE ORAL
Qty: 30 CAPSULE | Refills: 1 | Status: SHIPPED | OUTPATIENT
Start: 2023-12-14 | End: 2024-02-02 | Stop reason: SDUPTHER

## 2023-12-14 RX ORDER — DULOXETIN HYDROCHLORIDE 20 MG/1
20 CAPSULE, DELAYED RELEASE ORAL EVERY MORNING
Qty: 30 CAPSULE | Refills: 1 | Status: SHIPPED | OUTPATIENT
Start: 2023-12-14 | End: 2024-02-02

## 2023-12-14 RX ORDER — GABAPENTIN 300 MG/1
300 CAPSULE ORAL
Qty: 30 CAPSULE | Refills: 1 | Status: SHIPPED | OUTPATIENT
Start: 2023-12-14 | End: 2024-02-02 | Stop reason: SDUPTHER

## 2023-12-14 NOTE — PROGRESS NOTES
PSYCHIATRY VIRTUAL VISIT FOLLOW-UP NOTE    Chief Complaint   Patient presents with    Follow-Up     depression     This evaluation was conducted via Zoom using secure and encrypted videoconferencing technology.   The patient was in their home in the [facility].    The patient's identity was confirmed and verbal consent was obtained for this virtual visit.    History Of Present Illness:  Kristen Luna is a 38 y.o. female with major depressive disorder, intermittent explosive disorder, Sandra-Danlos Syndrome type III, gastroparesis, hypothyroidism comes in today for follow up, was last seen 2 months ago.  She is doing all right in regards to her depression since her last appointment with me.  She took Cymbalta at bedtime for a few days and started noticing that she was having a difficult time waking up so she stopped taking it.  However, she restarted taking it about a week ago in the morning and so far she has not noticed any other side effects.  She did increase her Geodon dose as discussed.  She is working a new job which has been going well for her.  She is still living with her friend which has been a supportive situation for her.  She mentions that her mother and stepfather are getting a divorce and her mother plans on moving to [place] once the divorce is done.  She has decided to stay here as she does have support from her father and stepmother.  She continues to enjoy spending time with her daughter and has not had any new relationship stressors with her ex-boyfriend.  She feels more functional during daytime but depression is still a struggle towards the end of the day.  She denies having thoughts of wanting to hurt herself.    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    Substance Use:  Alcohol - Denies   Nicotine - Smokes 1 pack a week  Cannabis - Denies   Illicit drugs - Denies     Past Medication Trials:  Prozac 20 mg x 2 weeks (effective, s/e - fatigue, facial movements, leg movements), Lexapro 5 mg x 3 weeks (effective, s/e - muscle twitching), Zoloft (drug allergy), Depakote (ineffective, s/e - anxiety), Lamictal, Zyprexa (effective, s/e - weight gain), Seroquel (ineffective), Trazodone 100 mg (effective, discontinued during pregnancy).  She has tried several other medications but is unable to recall them.    Medications:  Current Outpatient Medications   Medication Sig Dispense Refill    DULoxetine (CYMBALTA) 20 MG Cap DR Particles Take 1 Capsule by mouth every morning. 30 Capsule 1    ziprasidone (GEODON) 80 MG Cap Take 1 Capsule by mouth at bedtime. 30 Capsule 1    gabapentin (NEURONTIN) 300 MG Cap Take 1 Capsule by mouth at bedtime. 30 Capsule 1    cyclobenzaprine (FLEXERIL) 5 mg tablet Take 1-2 Tablets by mouth 3 times a day as needed for Mild Pain, Moderate Pain or Muscle Spasms. 30 Tablet 0    levothyroxine (SYNTHROID) 100 MCG Tab TAKE 1 TABLET BY MOUTH ONCE DAILY IN THE MORNING WITH 8 OZ WATER      atorvastatin (LIPITOR) 10 MG Tab Take 10 mg by mouth at bedtime.      ondansetron (ZOFRAN ODT) 4 MG TABLET DISPERSIBLE Take 4 mg by mouth every four hours as needed for Nausea. 20 Tablet 1    ondansetron (ZOFRAN) 4 MG Tab tablet Take 1 Tablet by mouth every four hours as needed for Nausea/Vomiting. 20 Tablet 1     No current facility-administered medications for this visit.     Review Of Systems:    Constitutional - Positive for fatigue  Psychiatric - Positive for depression     Physical Examination:  Vital signs: LMP 10/28/2023     Musculoskeletal: No abnormal  "movements.     Mental Status Evaluation:   General: Young female, dressed in casual attire, good grooming and hygiene, in no apparent distress, calm and cooperative, good eye contact, no psychomotor agitation or retardation  Orientation: Alert and oriented to person, place and time  Recent and remote memory: Grossly intact  Attention span and concentration: Grossly intact  Speech: Spontaneous, normal rate, rhythm and tone  Thought Process: Linear, logical and goal directed  Thought Content: Denies suicidal or homicidal ideations, intent or plan  Perception: No delusions noted  Associations: Intact  Language: Appropriate  Fund of knowledge and vocabulary: Grossly adequate  Mood: \"rough\"  Affect: Dysphoric, mood congruent  Insight: Good  Judgment: Good    Depression screenin/4/2023     7:30 AM 2023     7:15 PM 10/13/2023     1:29 PM   Depression Screen (PHQ-2/PHQ-9)   PHQ-2 Total Score 0 0 4   PHQ-9 Total Score   10     Interpretation of PHQ-9 Total Score   Score Severity   1-4 No Depression   5-9 Mild Depression   10-14 Moderate Depression   15-19 Moderately Severe Depression   20-27 Severe Depression    Anxiety screenin/21/2022     3:05 PM   PARISH 7   PARISH-7 Total Score 3     Interpretation of PARISH 7 Total Score   Score Severity:  0-4 No Anxiety   5-9 Mild Anxiety  10-14 Moderate Anxiety  15-21 Severe Anxiety    Medical Records/Labs/Diagnostic Tests Reviewed:  [place] PDMP records - 2 opioid medication prescription in the last 2 years      Impression:  1.  Major depressive disorder, recurrent, moderate with post partum onset - stable      2.  Intermittent explosive disorder - stable  3.  History of bipolar mood disorder and post partum depression    Plan:  1.  Continue Cymbalta 20 mg in the morning for depression   2.  Continue Geodon 80 mg at bedtime for depression augmentation  -Yearly Metabolic monitoring (2023): lipid profile with elevated total cholesterol, triglycerides and LDL, A1c normal at " 5  -Repeat yearly metabolic monitoring labs due in 9/2024  3.  Continue Neurontin 300 mg at bedtime for mood stabilization    Return to clinic in 2 months or sooner if symptoms worsen    The proposed treatment plan was discussed with the patient who was provided the opportunity to ask questions and make suggestions regarding alternative treatment. Patient verbalized understanding and expressed agreement with the plan.     Socorro Nance M.D.  12/14/23    This note was created using voice recognition software (Dragon). The accuracy of the dictation is limited by the abilities of the software. I have reviewed the note prior to signing, however some errors in grammar and context are still possible. If you have any questions related to this note please do not hesitate to contact our office.

## 2023-12-26 ENCOUNTER — HOSPITAL ENCOUNTER (OUTPATIENT)
Dept: RADIOLOGY | Facility: MEDICAL CENTER | Age: 38
End: 2023-12-26
Attending: NURSE PRACTITIONER
Payer: MEDICARE

## 2023-12-26 DIAGNOSIS — K31.84 GASTROPARESIS: ICD-10-CM

## 2023-12-26 DIAGNOSIS — F41.9 ANXIETY: ICD-10-CM

## 2023-12-26 DIAGNOSIS — Q79.60 EHLERS-DANLOS SYNDROME: ICD-10-CM

## 2023-12-26 PROCEDURE — A9541 TC99M SULFUR COLLOID: HCPCS

## 2024-01-10 ENCOUNTER — APPOINTMENT (OUTPATIENT)
Dept: BEHAVIORAL HEALTH | Facility: CLINIC | Age: 39
End: 2024-01-10
Payer: MEDICARE

## 2024-02-02 ENCOUNTER — TELEMEDICINE (OUTPATIENT)
Dept: BEHAVIORAL HEALTH | Facility: CLINIC | Age: 39
End: 2024-02-02
Payer: MEDICARE

## 2024-02-02 DIAGNOSIS — F63.81 INTERMITTENT EXPLOSIVE DISORDER: ICD-10-CM

## 2024-02-02 DIAGNOSIS — F33.1 MDD (MAJOR DEPRESSIVE DISORDER), RECURRENT EPISODE, MODERATE (HCC): ICD-10-CM

## 2024-02-02 PROCEDURE — 99214 OFFICE O/P EST MOD 30 MIN: CPT | Mod: 95 | Performed by: PSYCHIATRY & NEUROLOGY

## 2024-02-02 RX ORDER — GABAPENTIN 300 MG/1
300 CAPSULE ORAL
Qty: 30 CAPSULE | Refills: 1 | Status: SHIPPED | OUTPATIENT
Start: 2024-02-02

## 2024-02-02 RX ORDER — ZIPRASIDONE HYDROCHLORIDE 80 MG/1
80 CAPSULE ORAL
Qty: 30 CAPSULE | Refills: 1 | Status: SHIPPED | OUTPATIENT
Start: 2024-02-02 | End: 2024-02-09 | Stop reason: SDUPTHER

## 2024-02-02 RX ORDER — CHLORHEXIDINE GLUCONATE ORAL RINSE 1.2 MG/ML
SOLUTION DENTAL
COMMUNITY
Start: 2024-01-16

## 2024-02-02 RX ORDER — DULOXETIN HYDROCHLORIDE 30 MG/1
30 CAPSULE, DELAYED RELEASE ORAL
Qty: 30 CAPSULE | Refills: 1 | Status: SHIPPED | OUTPATIENT
Start: 2024-02-02

## 2024-02-02 NOTE — PROGRESS NOTES
PSYCHIATRY VIRTUAL VISIT FOLLOW-UP NOTE    Chief Complaint   Patient presents with    Depression     worsening     This evaluation was conducted via Zoom using secure and encrypted videoconferencing technology.   The patient was in their home in the [facility].    The patient's identity was confirmed and verbal consent was obtained for this virtual visit.    History Of Present Illness:  Kristen Luna is a 38 y.o. female with major depressive disorder, intermittent explosive disorder, Sandra-Danlos Syndrome type III, hypothyroidism, gastroparesis comes in today for follow up, was last seen about 6 weeks ago.  She has been noticing some increase struggles with depression and anger for the last 1 to 2 weeks.  She has been missing Cymbalta at least once her twice a week but has been compliant with Geodon and Neurontin.  She was having some difficulties living with a friend and she decided to move to follow with her and yesterday.  She can stay here for a while and feels that this will be a better place for her and her daughter.  She is still in a custody pugh for her daughter and the next court date is in April.  She has been trying her best to take care of herself.  She has been doing good in regards to both sleep and appetite.  She denies having thoughts of wanting to hurt herself.    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    Substance Use:  Alcohol - Denies   Nicotine - Denies, quit recently   Cannabis - Denies   Illicit drugs - Denies     Past Medication Trials:  Prozac  20 mg x 2 weeks (effective, s/e - fatigue, facial movements, leg movements), Lexapro 5 mg x 3 weeks (effective, s/e - muscle twitching), Zoloft (drug allergy), Depakote (ineffective, s/e - anxiety), Lamictal, Zyprexa (effective, s/e - weight gain), Seroquel (ineffective), Trazodone 100 mg (effective, discontinued during pregnancy).  She has tried several other medications but is unable to recall them.    Medications:  Current Outpatient Medications   Medication Sig Dispense Refill    chlorhexidine (PERIDEX) 0.12 % Solution RINSE MOUTH WITH 15 ML (1 CAPFUL) FOR 30 SECONDS IN THE MORNING AND EVENING AFTER TOOTHBRUSHING. EXPECTORATE AFTER RINSING. DO NOT SWALLOW      DULoxetine (CYMBALTA) 30 MG Cap DR Particles Take 1 Capsule by mouth at bedtime. 30 Capsule 1    ziprasidone (GEODON) 80 MG Cap Take 1 Capsule by mouth at bedtime. 30 Capsule 1    gabapentin (NEURONTIN) 300 MG Cap Take 1 Capsule by mouth at bedtime. 30 Capsule 1    cyclobenzaprine (FLEXERIL) 5 mg tablet Take 1-2 Tablets by mouth 3 times a day as needed for Mild Pain, Moderate Pain or Muscle Spasms. 30 Tablet 0    levothyroxine (SYNTHROID) 100 MCG Tab TAKE 1 TABLET BY MOUTH ONCE DAILY IN THE MORNING WITH 8 OZ WATER      atorvastatin (LIPITOR) 10 MG Tab Take 10 mg by mouth at bedtime.       No current facility-administered medications for this visit.     Review Of Systems:    Constitutional - Positive for fatigue  Psychiatric - Positive for depression, anger    Physical Examination:  Vital signs: There were no vitals taken for this visit.    Musculoskeletal: No abnormal movements.     Mental Status Evaluation:   General: Young female, dressed in casual attire, good grooming and hygiene, in no apparent distress, calm and cooperative, good eye contact, no psychomotor agitation or retardation  Orientation: Alert and oriented to person, place and time  Recent and remote memory: Grossly intact  Attention span and concentration: Grossly intact  Speech:  "Spontaneous, normal rate, rhythm and tone  Thought Process: Linear, logical and goal directed  Thought Content: Denies suicidal or homicidal ideations, intent or plan  Perception: No delusions noted  Associations: Intact  Language: Appropriate  Fund of knowledge and vocabulary: Grossly adequate  Mood: \"stressed\"  Affect: Dysphoric, mood congruent  Insight: Good  Judgment: Good    Depression screenin/4/2023     7:30 AM 2023     7:15 PM 10/13/2023     1:29 PM   Depression Screen (PHQ-2/PHQ-9)   PHQ-2 Total Score 0 0 4   PHQ-9 Total Score   10     Interpretation of PHQ-9 Total Score   Score Severity   1-4 No Depression   5-9 Mild Depression   10-14 Moderate Depression   15-19 Moderately Severe Depression   20-27 Severe Depression    Anxiety screenin/21/2022     3:05 PM   PARISH 7   PARISH-7 Total Score 3     Interpretation of PARISH 7 Total Score   Score Severity:  0-4 No Anxiety   5-9 Mild Anxiety  10-14 Moderate Anxiety  15-21 Severe Anxiety    Medical Records/Labs/Diagnostic Tests Reviewed:  [place] PDMP records - 2 opioid medication prescription in the last 2 years      Impression:  1.  Major depressive disorder, recurrent, moderate with post partum onset - worsening       2.  Intermittent explosive disorder - worsening   3.  History of bipolar mood disorder and post partum depression    Plan:  1.  Increase Cymbalta to 30 mg and switch timing to bedtime for depression   2.  Continue Geodon 80 mg at bedtime for depression augmentation  -Yearly Metabolic monitoring (2023): lipid profile with elevated total cholesterol, triglycerides and LDL, A1c normal at 5  -Repeat yearly metabolic monitoring labs due in 2024  3.  Continue Neurontin 300 mg at bedtime for mood stabilization    Return to clinic in 2 months or sooner if symptoms worsen    The proposed treatment plan was discussed with the patient who was provided the opportunity to ask questions and make suggestions regarding alternative treatment. Patient " verbalized understanding and expressed agreement with the plan.     Socorro Nance M.D.  02/02/24    This note was created using voice recognition software (Dragon). The accuracy of the dictation is limited by the abilities of the software. I have reviewed the note prior to signing, however some errors in grammar and context are still possible. If you have any questions related to this note please do not hesitate to contact our office.

## 2024-02-09 ENCOUNTER — TELEMEDICINE (OUTPATIENT)
Dept: BEHAVIORAL HEALTH | Facility: CLINIC | Age: 39
End: 2024-02-09
Payer: MEDICARE

## 2024-02-09 DIAGNOSIS — F63.81 INTERMITTENT EXPLOSIVE DISORDER: ICD-10-CM

## 2024-02-09 DIAGNOSIS — F33.1 MDD (MAJOR DEPRESSIVE DISORDER), RECURRENT EPISODE, MODERATE (HCC): ICD-10-CM

## 2024-02-09 PROCEDURE — 99214 OFFICE O/P EST MOD 30 MIN: CPT | Mod: 95 | Performed by: PSYCHIATRY & NEUROLOGY

## 2024-02-09 RX ORDER — ZIPRASIDONE HYDROCHLORIDE 60 MG/1
60 CAPSULE ORAL
Qty: 30 CAPSULE | Refills: 1 | Status: SHIPPED | OUTPATIENT
Start: 2024-02-09

## 2024-02-09 NOTE — PROGRESS NOTES
PSYCHIATRY VIRTUAL VISIT FOLLOW-UP NOTE    Chief Complaint   Patient presents with    Other     Worsening facial movements     This evaluation was conducted via Zoom using secure and encrypted videoconferencing technology.   The patient was in their home in the [facility].    The patient's identity was confirmed and verbal consent was obtained for this virtual visit.    History Of Present Illness:  Kristen Luna is a 38 y.o. female with major depressive disorder, intermittent explosive disorder, Sandra-Danlos Syndrome type III, hypothyroidism, gastroparesis comes in today for follow up, was last seen 1 week ago.  She has been noticing more involuntary facial movements and is concerned about them.  She has been taking Cymbalta 20 mg and was unable to  to 30 mg dose that was prescribed last week.  She has been on Geodon for over 5 years now.  She has been doing all right in regards to her depression.  She mentions that her mother is in the process of moving to [place], [place] after her divorce is finalized and she has decided to move with her.  She has a court date for her daughter's custody in April but is trying to get permission from court to move in March.  She is not sure how her ex-boyfriend will take this decision.  Her support has definitely been limited over here and she does feel that moving to [place] where she has lived before is a good plan for her.  She denies having suicidal thoughts.    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    Substance Use:  Alcohol - Denies   Nicotine -  Denies  Cannabis - Denies   Illicit drugs - Denies     Past Medication Trials:  Prozac 20 mg x 2 weeks (effective, s/e - fatigue, facial movements, leg movements), Lexapro 5 mg x 3 weeks (effective, s/e - muscle twitching), Zoloft (drug allergy), Depakote (ineffective, s/e - anxiety), Lamictal, Zyprexa (effective, s/e - weight gain), Seroquel (ineffective), Trazodone 100 mg (effective, discontinued during pregnancy).  She has tried several other medications but is unable to recall them.    Medications:  Current Outpatient Medications   Medication Sig Dispense Refill    ziprasidone (GEODON) 60 MG Cap Take 1 Capsule by mouth at bedtime. 30 Capsule 1    chlorhexidine (PERIDEX) 0.12 % Solution RINSE MOUTH WITH 15 ML (1 CAPFUL) FOR 30 SECONDS IN THE MORNING AND EVENING AFTER TOOTHBRUSHING. EXPECTORATE AFTER RINSING. DO NOT SWALLOW      DULoxetine (CYMBALTA) 30 MG Cap DR Particles Take 1 Capsule by mouth at bedtime. 30 Capsule 1    gabapentin (NEURONTIN) 300 MG Cap Take 1 Capsule by mouth at bedtime. 30 Capsule 1    cyclobenzaprine (FLEXERIL) 5 mg tablet Take 1-2 Tablets by mouth 3 times a day as needed for Mild Pain, Moderate Pain or Muscle Spasms. 30 Tablet 0    levothyroxine (SYNTHROID) 100 MCG Tab TAKE 1 TABLET BY MOUTH ONCE DAILY IN THE MORNING WITH 8 OZ WATER      atorvastatin (LIPITOR) 10 MG Tab Take 10 mg by mouth at bedtime.       No current facility-administered medications for this visit.     Review Of Systems:    Constitutional - Positive for fatigue  Psychiatric - Positive for depression    Physical Examination:  Vital signs: There were no vitals taken for this visit.    Musculoskeletal: Involuntary orofacial movements noted    Mental Status Evaluation:   General: Young female, dressed in casual attire, good grooming and hygiene, in no apparent distress, calm and cooperative, good eye contact, no psychomotor agitation or retardation  Orientation: Alert and oriented to person, place and time  Recent and remote  "memory: Grossly intact  Attention span and concentration: Grossly intact  Speech: Spontaneous, normal rate, rhythm and tone  Thought Process: Linear, logical and goal directed  Thought Content: Denies suicidal or homicidal ideations, intent or plan  Perception: No delusions noted  Associations: Intact  Language: Appropriate  Fund of knowledge and vocabulary: Grossly adequate  Mood: \"alright\"  Affect: Dysphoric, mood congruent  Insight: Good  Judgment: Good    Depression screenin/4/2023     7:30 AM 2023     7:15 PM 10/13/2023     1:29 PM   Depression Screen (PHQ-2/PHQ-9)   PHQ-2 Total Score 0 0 4   PHQ-9 Total Score   10     Interpretation of PHQ-9 Total Score   Score Severity   1-4 No Depression   5-9 Mild Depression   10-14 Moderate Depression   15-19 Moderately Severe Depression   20-27 Severe Depression    Anxiety screenin/21/2022     3:05 PM   PARISH 7   PARISH-7 Total Score 3     Interpretation of PARISH 7 Total Score   Score Severity:  0-4 No Anxiety   5-9 Mild Anxiety  10-14 Moderate Anxiety  15-21 Severe Anxiety    Medical Records/Labs/Diagnostic Tests Reviewed:  [facility] records - appropriate refills      Impression:  1.  Major depressive disorder, recurrent, moderate with post partum onset - worsening       2.  Intermittent explosive disorder - worsening   3.  History of bipolar mood disorder and post partum depression    Plan:  1.  Increase Cymbalta to 30 mg and switch timing to bedtime for depression   2.  Decrease Geodon to 60 mg at bedtime for depression augmentation.  Discussed involuntary orofacial movements as a side effect of Geodon rather than Cymbalta.  She has been on Geodon for over 5 years and it is likely that Geodon is the culprit.  She is agreeable to lowering the dose down to 60 mg as a start.  She has definitely done better in regards to mood management with Geodon but is agreeable to see if she can slowly be tapered off Geodon.  -Yearly Metabolic monitoring (2023): lipid " profile with elevated total cholesterol, triglycerides and LDL, A1c normal at 5  -Repeat yearly metabolic monitoring labs due in 9/2024  3.  Continue Neurontin 300 mg at bedtime for mood stabilization  4.  She will let me know through my chart once there is a final date of moving to [place].  She is aware that once she moves to [place] she will have to get established with Psychiatry over there for medication management.  I will try my best to see her before she leaves [place].    Return to clinic in 6 weeks or sooner if symptoms worsen    The proposed treatment plan was discussed with the patient who was provided the opportunity to ask questions and make suggestions regarding alternative treatment. Patient verbalized understanding and expressed agreement with the plan.     Socorro Nance M.D.  02/09/24    This note was created using voice recognition software (Dragon). The accuracy of the dictation is limited by the abilities of the software. I have reviewed the note prior to signing, however some errors in grammar and context are still possible. If you have any questions related to this note please do not hesitate to contact our office.

## 2024-04-09 ENCOUNTER — TELEMEDICINE (OUTPATIENT)
Dept: BEHAVIORAL HEALTH | Facility: CLINIC | Age: 39
End: 2024-04-09
Payer: MEDICARE

## 2024-04-09 DIAGNOSIS — G24.01 TARDIVE DYSKINESIA: ICD-10-CM

## 2024-04-09 DIAGNOSIS — F63.81 INTERMITTENT EXPLOSIVE DISORDER: ICD-10-CM

## 2024-04-09 DIAGNOSIS — F33.0 MDD (MAJOR DEPRESSIVE DISORDER), RECURRENT EPISODE, MILD (HCC): ICD-10-CM

## 2024-04-09 PROCEDURE — 99214 OFFICE O/P EST MOD 30 MIN: CPT | Mod: 95 | Performed by: PSYCHIATRY & NEUROLOGY

## 2024-04-09 RX ORDER — DULOXETIN HYDROCHLORIDE 30 MG/1
30 CAPSULE, DELAYED RELEASE ORAL EVERY MORNING
Qty: 30 CAPSULE | Refills: 1 | Status: SHIPPED | OUTPATIENT
Start: 2024-04-09

## 2024-04-09 RX ORDER — ZIPRASIDONE HYDROCHLORIDE 20 MG/1
CAPSULE ORAL
Qty: 42 CAPSULE | Refills: 0 | Status: SHIPPED | OUTPATIENT
Start: 2024-04-09 | End: 2024-05-07

## 2024-04-09 RX ORDER — GABAPENTIN 300 MG/1
300 CAPSULE ORAL
Qty: 30 CAPSULE | Refills: 1 | Status: SHIPPED | OUTPATIENT
Start: 2024-04-09

## 2024-04-09 NOTE — PROGRESS NOTES
PSYCHIATRY VIRTUAL VISIT FOLLOW-UP NOTE    Chief Complaint   Patient presents with    Follow-Up     mood     This evaluation was conducted via Zoom using secure and encrypted videoconferencing technology.   The patient was in their home in the state of [place].    The patient's identity was confirmed and verbal consent was obtained for this virtual visit.    History Of Present Illness:  Kristen Luna is a 39 y.o. female with major depressive disorder, intermittent explosive disorder, Sandra-Danlos Syndrome type III, hypothyroidism, gastroparesis comes in today for follow up, was last seen 2 months ago.  She is doing better in regards to her mood since her last appointment with me.  However, she is still concerned about involuntary facial movements and has not noticed any changes with decreasing Geodon dose.  She would like to get off Geodon and maybe try Zyprexa again.  She does not recall having musculoskeletal side effects from Zyprexa but did notice weight gain as a side effect.  She and her daughter are still living with her aunt and the plan to move to [place] has been put on hold for now.  She mentions that her mother has to find a place for herself first before she and her daughter unable to move.  She was granted permission by the court to move to a different state with her daughter.  She denies any other psychosocial stressors.  She denies having thoughts of wanting to hurt herself.    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    Substance Use:  Alcohol - Denies   Nicotine - Denies  Cannabis - Denies   Illicit  drugs - Denies     Past Medication Trials:  Prozac 20 mg x 2 weeks (effective, s/e - fatigue, facial movements, leg movements), Lexapro 5 mg x 3 weeks (effective, s/e - muscle twitching), Zoloft (drug allergy), Depakote (ineffective, s/e - anxiety), Lamictal, Zyprexa (effective, s/e - weight gain), Seroquel (ineffective), Trazodone 100 mg (effective, discontinued during pregnancy).  She has tried several other medications but is unable to recall them.    Medications:  Current Outpatient Medications   Medication Sig Dispense Refill    ziprasidone (GEODON) 20 MG Cap Take 2 Capsules by mouth at bedtime for 14 days, THEN 1 Capsule at bedtime for 14 days. 42 Capsule 0    DULoxetine (CYMBALTA) 30 MG Cap DR Particles Take 1 Capsule by mouth every morning. 30 Capsule 1    gabapentin (NEURONTIN) 300 MG Cap Take 1 Capsule by mouth at bedtime. 30 Capsule 1    chlorhexidine (PERIDEX) 0.12 % Solution RINSE MOUTH WITH 15 ML (1 CAPFUL) FOR 30 SECONDS IN THE MORNING AND EVENING AFTER TOOTHBRUSHING. EXPECTORATE AFTER RINSING. DO NOT SWALLOW      cyclobenzaprine (FLEXERIL) 5 mg tablet Take 1-2 Tablets by mouth 3 times a day as needed for Mild Pain, Moderate Pain or Muscle Spasms. 30 Tablet 0    levothyroxine (SYNTHROID) 100 MCG Tab TAKE 1 TABLET BY MOUTH ONCE DAILY IN THE MORNING WITH 8 OZ WATER      atorvastatin (LIPITOR) 10 MG Tab Take 10 mg by mouth at bedtime.       No current facility-administered medications for this visit.     Review Of Systems:    Constitutional - Positive for fatigue  Psychiatric - Positive for depression    Physical Examination:  Vital signs: There were no vitals taken for this visit.    Musculoskeletal: Involuntary orofacial movements noted    Mental Status Evaluation:   General: Young female, dressed in casual attire, good grooming and hygiene, in no apparent distress, calm and cooperative, good eye contact, no psychomotor agitation or retardation  Orientation: Alert and oriented to person, place and  "time  Recent and remote memory: Grossly intact  Attention span and concentration: Grossly intact  Speech: Spontaneous, normal rate, rhythm and tone  Thought Process: Linear, logical and goal directed  Thought Content: Denies suicidal or homicidal ideations, intent or plan  Perception: No delusions noted  Associations: Intact  Language: Appropriate  Fund of knowledge and vocabulary: Grossly adequate  Mood: \"alright\"  Affect: Euthymic, mood congruent  Insight: Good  Judgment: Good    Depression screenin/4/2023     7:30 AM 2023     7:15 PM 10/13/2023     1:29 PM   Depression Screen (PHQ-2/PHQ-9)   PHQ-2 Total Score 0 0 4   PHQ-9 Total Score   10     Interpretation of PHQ-9 Total Score   Score Severity   1-4 No Depression   5-9 Mild Depression   10-14 Moderate Depression   15-19 Moderately Severe Depression   20-27 Severe Depression    Anxiety screenin/21/2022     3:05 PM   PARISH 7   PARISH-7 Total Score 3     Interpretation of PARISH 7 Total Score   Score Severity:  0-4 No Anxiety   5-9 Mild Anxiety  10-14 Moderate Anxiety  15-21 Severe Anxiety    Medical Records/Labs/Diagnostic Tests Reviewed:  [facility] records - appropriate refills      Impression:  1.  Major depressive disorder, recurrent, mild with post partum onset - improving        2.  Intermittent explosive disorder - improving    3.  Tardive dyskinesia - stable  4.  History of bipolar mood disorder and post partum depression    Plan:  1.  Continue Cymbalta 30 mg in the morning for depression  2.  Decrease Geodon to 40 mg at bedtime for 2 weeks followed by 20 mg at bedtime for 2 weeks and then discontinue for depression augmentation.  She is having tardive dyskinesia as a side effect from chronic use of antipsychotic medication and is agreeable to tapering off Geodon completely.  She would like to try Zyprexa instead but I let her know that Zyprexa also carries the same risk of tardive dyskinesia.  Will attempt to manage mood symptoms with Cymbalta " and Neurontin and avoid antipsychotic medications if possible.  -Yearly metabolic monitoring (9/2023): lipid profile with elevated total cholesterol, triglycerides and LDL, A1c normal at 5  -Repeat yearly metabolic monitoring labs due in 9/2024  3.  Continue Neurontin 300 mg at bedtime for mood stabilization    Return to clinic in 6 weeks or sooner if symptoms worsen    The proposed treatment plan was discussed with the patient who was provided the opportunity to ask questions and make suggestions regarding alternative treatment. Patient verbalized understanding and expressed agreement with the plan.     Socorro Nance M.D.  04/09/24    This note was created using voice recognition software (Dragon). The accuracy of the dictation is limited by the abilities of the software. I have reviewed the note prior to signing, however some errors in grammar and context are still possible. If you have any questions related to this note please do not hesitate to contact our office.

## 2024-05-16 DIAGNOSIS — F63.81 INTERMITTENT EXPLOSIVE DISORDER: ICD-10-CM

## 2024-05-16 DIAGNOSIS — F33.0 MDD (MAJOR DEPRESSIVE DISORDER), RECURRENT EPISODE, MILD (HCC): ICD-10-CM

## 2024-05-16 RX ORDER — GABAPENTIN 300 MG/1
300 CAPSULE ORAL
Qty: 90 CAPSULE | Refills: 0 | Status: SHIPPED | OUTPATIENT
Start: 2024-05-16

## 2024-05-16 RX ORDER — DULOXETIN HYDROCHLORIDE 30 MG/1
30 CAPSULE, DELAYED RELEASE ORAL EVERY MORNING
Qty: 90 CAPSULE | Refills: 0 | Status: SHIPPED | OUTPATIENT
Start: 2024-05-16

## 2024-05-16 NOTE — TELEPHONE ENCOUNTER
Pt moved to [place]. She is having trouble transferring her Duloxetine and Gabapentin. Can you please resend the prescriptions to [facility] in [place] so she is able fill in [place]?    Received request via: Patient    Was the patient seen in the last year in this department? Yes    Does the patient have an active prescription (recently filled or refills available) for medication(s) requested? No    Pharmacy Name: [facility]    Does the patient have halfway Plus and need 100 day supply (blood pressure, diabetes and cholesterol meds only)? Medication is not for cholesterol, blood pressure or diabetes and Patient does not have SCP

## (undated) DEVICE — BLANKET UNDERBODY FULL ACCES - (5/CA)

## (undated) DEVICE — SLEEVE, SEQUENTIAL CALF REG

## (undated) DEVICE — CATHETER IV NON-SAFETY 18 GA X 1 1/4 (50/BX 4BX/CA)

## (undated) DEVICE — TUBING CLEARLINK DUO-VENT - C-FLO (48EA/CA)

## (undated) DEVICE — PACK ROOM TURNOVER L&D (12/CA)

## (undated) DEVICE — WATER IRRIGATION STERILE 1000ML (12EA/CA)

## (undated) DEVICE — CHLORAPREP 3 ML APPLICATOR - (25/BX 4BX/CS 100/CS)

## (undated) DEVICE — HEAD HOLDER JUNIOR/ADULT

## (undated) DEVICE — GLOVE BIOGEL SZ 6.5 SURGICAL PF LTX (50PR/BX 4BX/CA)

## (undated) DEVICE — BAG SPONGE COUNT 10.25 X 32 - BLUE (250/CA)

## (undated) DEVICE — SUCTION INSTRUMENT YANKAUER BULBOUS TIP W/O VENT (50EA/CA)

## (undated) DEVICE — SPONGE XRAY 8X4 STERL. 12PL - (10EA/TY 80TY/CA)

## (undated) DEVICE — SOLUTION 10%PVP-IODINE 8OZ - (24/CA)

## (undated) DEVICE — TUBE SUCTION YANKAUER  1/4 X 6FT (20EA/CA)"

## (undated) DEVICE — SET EXTENSION WITH 2 PORTS (48EA/CA) ***PART #2C8610 IS A SUBSTITUTE*****

## (undated) DEVICE — SUTUREABS02-0 CT1 27IN - (36EA/BX)

## (undated) DEVICE — SUTURE 1 CHROMIC CTX ETHICON - (36PK/BX)

## (undated) DEVICE — SUTURE 4-0 27IN MONOCRYL +ANTI (36PK/BX)

## (undated) DEVICE — SUTURE 3-0 MONOCRYL PLUS PS-2 - (12/BX)

## (undated) DEVICE — SODIUM CHL IRRIGATION 0.9% 1000ML (12EA/CA)

## (undated) DEVICE — SUTURE 0 VICRYL PLUS CT-1 - 36 INCH (36/BX)

## (undated) DEVICE — GLOVE BIOGEL INDICATOR SZ 6.5 SURGICAL PF LTX - (50PR/BX 4BX/CA)

## (undated) DEVICE — BLANKET STERILE CHICKIE FOR L&D (100/CA)

## (undated) DEVICE — PACK DELIVERY ROOM (7EA/CA)

## (undated) DEVICE — ELECTRODE DUAL RETURN W/ CORD - (50/PK)

## (undated) DEVICE — BALLOON CATHETER BAKRI UTERINE TEMPENADE

## (undated) DEVICE — CLOSURE SKIN STRIP 1/2 X 4 IN - (STERI STRIP) (50/BX 4BX/CA)

## (undated) DEVICE — TRAY SPINAL ANESTHESIA NON-SAFETY (10/CA)

## (undated) DEVICE — TRAY FOLEY CATHETER STATLOCK 16FR SURESTEP  (10EA/CA)

## (undated) DEVICE — SUTURE 0 VICRYL PLUS CTX - 36 INCH (36/BX)

## (undated) DEVICE — TRAY SRGPRP PVP IOD WT PRP - (20/CA)

## (undated) DEVICE — TUBE CONNECT SUCTION CLEAR 120 X 1/4" (50EA/CA)"

## (undated) DEVICE — SENSOR SPO2 NEO LNCS ADHESIVE (20/BX) SEE USER NOTES

## (undated) DEVICE — LEGGING LITHOTOMY 31 X 48 IN - (2EA/PK 20PK/CA)

## (undated) DEVICE — GLOVE BIOGEL INDICATOR SZ 7SURGICAL PF LTX - (50/BX 4BX/CA)

## (undated) DEVICE — KIT  I.V. START (100EA/CA)

## (undated) DEVICE — PLUMEPEN ULTRA 3/8 IN X 10 FT HOSE (20EA/CA)

## (undated) DEVICE — DRESSING POST OP BORDER 4 X 10 (5EA/BX)

## (undated) DEVICE — DRESSING ABDOMINAL PAD STERILE 8 X 10" (360EA/CA)"

## (undated) DEVICE — SOLUTION PLASMA-LYTE PH 7.4 INJ 1000ML  (14EA/CA)

## (undated) DEVICE — CANISTER SUCTION 3000ML MECHANICAL FILTER AUTO SHUTOFF MEDI-VAC NONSTERILE LF DISP  (40EA/CA)

## (undated) DEVICE — GLOVE BIOGEL SZ 7 SURGICAL PF LTX - (50PR/BX 4BX/CA)

## (undated) DEVICE — CHLORAPREP 26 ML APPLICATOR - ORANGE TINT(25/CA)

## (undated) DEVICE — PACK C-SECTION (2EA/CA)

## (undated) DEVICE — STAPLER SKIN DISP - (6/BX 10BX/CA) VISISTAT

## (undated) DEVICE — DETERGENT RENUZYME PLUS 10 OZ PACKET (50/BX)

## (undated) DEVICE — ADHESIVE DERMABOND HVD MINI (12EA/BX)

## (undated) DEVICE — PAD GROUNDING PRE-JELLED - (50EA/PK)

## (undated) DEVICE — SUTURE 3-0 VICRYL PLUS CT-1 - 36 INCH (36/BX)

## (undated) DEVICE — WRAP PROBE OXIMETER INFANT UNIVERSAL DESIGN TO FIT NEONATAL FOOT INFANT TOE OR PED FINGER  (12EA/BX)

## (undated) DEVICE — CANNULA O2 COMFORT SOFT EAR ADULT 7 FT TUBING (50/CA)

## (undated) DEVICE — SENSOR SPO2 ADULT LNCS ADTX (20/BX) ORDER ITEM #19593

## (undated) DEVICE — ELECTRODE RADIOLUCENT LF 3PK - RED DOT (3/PK 200PK/CA)